# Patient Record
Sex: FEMALE | Race: WHITE | NOT HISPANIC OR LATINO | Employment: FULL TIME | URBAN - METROPOLITAN AREA
[De-identification: names, ages, dates, MRNs, and addresses within clinical notes are randomized per-mention and may not be internally consistent; named-entity substitution may affect disease eponyms.]

---

## 2017-01-09 ENCOUNTER — OFFICE VISIT (OUTPATIENT)
Dept: MEDICAL GROUP | Facility: PHYSICIAN GROUP | Age: 62
End: 2017-01-09
Payer: COMMERCIAL

## 2017-01-09 ENCOUNTER — HOSPITAL ENCOUNTER (OUTPATIENT)
Dept: LAB | Facility: MEDICAL CENTER | Age: 62
End: 2017-01-09
Attending: FAMILY MEDICINE
Payer: COMMERCIAL

## 2017-01-09 VITALS
HEIGHT: 65 IN | WEIGHT: 213 LBS | DIASTOLIC BLOOD PRESSURE: 78 MMHG | HEART RATE: 74 BPM | TEMPERATURE: 97.8 F | BODY MASS INDEX: 35.49 KG/M2 | SYSTOLIC BLOOD PRESSURE: 124 MMHG | OXYGEN SATURATION: 99 %

## 2017-01-09 DIAGNOSIS — E03.8 OTHER SPECIFIED HYPOTHYROIDISM: ICD-10-CM

## 2017-01-09 DIAGNOSIS — E66.9 OBESITY (BMI 30-39.9): ICD-10-CM

## 2017-01-09 DIAGNOSIS — R73.01 ELEVATED FASTING GLUCOSE: ICD-10-CM

## 2017-01-09 DIAGNOSIS — R79.89 LOW SERUM VITAMIN D: ICD-10-CM

## 2017-01-09 LAB
25(OH)D3 SERPL-MCNC: 58 NG/ML (ref 30–100)
ALBUMIN SERPL BCP-MCNC: 4.2 G/DL (ref 3.2–4.9)
ALBUMIN/GLOB SERPL: 1.6 G/DL
ALP SERPL-CCNC: 74 U/L (ref 30–99)
ALT SERPL-CCNC: 10 U/L (ref 2–50)
ANION GAP SERPL CALC-SCNC: 6 MMOL/L (ref 0–11.9)
AST SERPL-CCNC: 14 U/L (ref 12–45)
BILIRUB SERPL-MCNC: 0.5 MG/DL (ref 0.1–1.5)
BUN SERPL-MCNC: 17 MG/DL (ref 8–22)
CALCIUM SERPL-MCNC: 9.3 MG/DL (ref 8.5–10.5)
CHLORIDE SERPL-SCNC: 104 MMOL/L (ref 96–112)
CHOLEST SERPL-MCNC: 180 MG/DL (ref 100–199)
CO2 SERPL-SCNC: 28 MMOL/L (ref 20–33)
CREAT SERPL-MCNC: 0.83 MG/DL (ref 0.5–1.4)
EST. AVERAGE GLUCOSE BLD GHB EST-MCNC: 111 MG/DL
GLOBULIN SER CALC-MCNC: 2.6 G/DL (ref 1.9–3.5)
GLUCOSE SERPL-MCNC: 88 MG/DL (ref 65–99)
HBA1C MFR BLD: 5.5 % (ref 0–5.6)
HDLC SERPL-MCNC: 54 MG/DL
LDLC SERPL CALC-MCNC: 109 MG/DL
POTASSIUM SERPL-SCNC: 4.2 MMOL/L (ref 3.6–5.5)
PROT SERPL-MCNC: 6.8 G/DL (ref 6–8.2)
SODIUM SERPL-SCNC: 138 MMOL/L (ref 135–145)
T4 FREE SERPL-MCNC: 1.33 NG/DL (ref 0.53–1.43)
TRIGL SERPL-MCNC: 83 MG/DL (ref 0–149)
TSH SERPL DL<=0.005 MIU/L-ACNC: 0.79 UIU/ML (ref 0.3–3.7)

## 2017-01-09 PROCEDURE — 82306 VITAMIN D 25 HYDROXY: CPT

## 2017-01-09 PROCEDURE — 84439 ASSAY OF FREE THYROXINE: CPT

## 2017-01-09 PROCEDURE — 83036 HEMOGLOBIN GLYCOSYLATED A1C: CPT

## 2017-01-09 PROCEDURE — 84443 ASSAY THYROID STIM HORMONE: CPT

## 2017-01-09 PROCEDURE — 36415 COLL VENOUS BLD VENIPUNCTURE: CPT

## 2017-01-09 PROCEDURE — 80053 COMPREHEN METABOLIC PANEL: CPT

## 2017-01-09 PROCEDURE — 99204 OFFICE O/P NEW MOD 45 MIN: CPT | Performed by: FAMILY MEDICINE

## 2017-01-09 PROCEDURE — 80061 LIPID PANEL: CPT

## 2017-01-09 ASSESSMENT — PATIENT HEALTH QUESTIONNAIRE - PHQ9: CLINICAL INTERPRETATION OF PHQ2 SCORE: 0

## 2017-01-09 NOTE — MR AVS SNAPSHOT
"Freddy Smith   2017 8:40 AM   Office Visit   MRN: [id number]    Department:  [facility]   Dept Phone:  [phone]    Description:  Female : 1955   Provider:  Rose Pederson D.O.           Reason for Visit     Establish Care           Allergies as of 2017     No Known Allergies      You were diagnosed with     Other specified hypothyroidism   [2949313]       Obesity (BMI 30-39.9)   [707209]       Elevated fasting glucose   [335681]       Low serum vitamin D   [9096037]         Vital Signs     Blood Pressure Pulse Temperature Height Weight Body Mass Index    124/78 mmHg 74 36.6 °C (97.8 °F) 1.651 m (5' 5\") 96.616 kg (213 lb) 35.44 kg/m2    Oxygen Saturation Smoking Status                99% Never Smoker           Basic Information     Date Of Birth Sex Race Ethnicity Preferred Language    1955 Female White Non- English      Problem List              ICD-10-CM Priority Class Noted - Resolved    Generalized osteoarthritis M15.9   2014 - Present    Overweight E66.3   2014 - Present    Hypothyroidism E03.9   2014 - Present    Lower back injury S39.92XA   2014 - Present    Obesity (BMI 30-39.9) E66.9   2017 - Present    Elevated fasting glucose R73.01   2017 - Present    Low serum vitamin D R79.89   2017 - Present      Health Maintenance        Date Due Completion Dates    PAP SMEAR 1978 ---    MAMMOGRAM 1997 ---    IMM DTaP/Tdap/Td Vaccine (1 - Tdap) 1999    COLONOSCOPY 2007 ---            Current Immunizations     Hepatitis A Vaccine, Adult 1999    Hepatitis B Vaccine Recombivax (Adol/Adult) 2000, 1999, 1999    Influenza TIV (IM) 10/20/2012, 10/2/2011, 10/10/2010    Influenza Vaccine Quad Inj (Pf) 10/1/2016, 2014    Influenza Vaccine Quad Inj (Preserved) 10/5/2013    MMR Vaccine 1999, 1999    OPV - Historical Data 1999    Pneumococcal polysaccharide vaccine (PPSV-23) " 1/13/1998    TD Vaccine 5/18/1999      Below and/or attached are the medications your provider expects you to take. Review all of your home medications and newly ordered medications with your provider and/or pharmacist. Follow medication instructions as directed by your provider and/or pharmacist. Please keep your medication list with you and share with your provider. Update the information when medications are discontinued, doses are changed, or new medications (including over-the-counter products) are added; and carry medication information at all times in the event of emergency situations     Allergies:  No Known Allergies          Medications  Valid as of: January 09, 2017 -  9:12 AM    Generic Name Brand Name Tablet Size Instructions for use    Ergocalciferol (Cap) DRISDOL 00740 UNITS Take  by mouth every 7 days. Check with dr before resuming        Levothyroxine Sodium   Take 100 mcg by mouth every day.        Ondansetron (TABLET DISPERSIBLE) ZOFRAN ODT 4 MG Take 4 mg by mouth every 8 hours as needed. Indications: Nausea and Vomiting Following an Operation        .                 Medicines prescribed today were sent to:     [facility]/PHARMACY #[id number] - [place], [place] - [address] [address]    Phone: [phone] Fax: [phone]    Open 24 Hours?: No      Medication refill instructions:       If your prescription bottle indicates you have medication refills left, it is not necessary to call your provider’s office. Please contact your pharmacy and they will refill your medication.    If your prescription bottle indicates you do not have any refills left, you may request refills at any time through one of the following ways: The online [employer] system (except Urgent Care), by calling your provider’s office, or by asking your pharmacy to contact your provider’s office with a refill request. Medication refills are processed only during regular business hours  and may not be available until the next business day. Your provider may request additional information or to have a follow-up visit with you prior to refilling your medication.   *Please Note: Medication refills are assigned a new Rx number when refilled electronically. Your pharmacy may indicate that no refills were authorized even though a new prescription for the same medication is available at the pharmacy. Please request the medicine by name with the pharmacy before contacting your provider for a refill.        Your To Do List     Future Labs/Procedures Complete By Expires    COMP METABOLIC PANEL  As directed 1/10/2018    FREE THYROXINE  As directed 1/10/2018    HEMOGLOBIN A1C  As directed 1/10/2018    LIPID PROFILE  As directed 1/10/2018    TSH  As directed 1/10/2018    VITAMIN D,25 HYDROXY  As directed 1/10/2018         Transactiv Access Code: [email or web address]  Expires: 2/8/2017  9:12 AM    Your email address is not on file at [facility] [employer].  Email Addresses are required for you to sign up for Transactiv, please contact [phone] to verify your personal information and to provide your email address prior to attempting to register for Transactiv.    Freddy Smith  [address] [place] [address]    Transactiv  A secure, online tool to manage your health information     [employer]’s Transactiv® is a secure, online tool that connects you to your personalized health information from the privacy of your home -- day or night - making it very easy for you to manage your healthcare. Once the activation process is completed, you can even access your medical information using the Transactiv thea, which is available for free in the Apple Thea store or Google Play store.     To learn more about Transactiv, visit [email or web address]    There are two levels of access available (as shown below):   My Chart Features  [facility] Primary Care Doctor [facility]  Specialists [facility]  Urgent  Care Non-[facility] Primary Care Doctor   Email your  healthcare team securely and privately 24/7 X X X    Manage appointments: schedule your next appointment; view details of past/upcoming appointments X      Request prescription refills. X      View recent personal medical records, including lab and immunizations X X X X   View health record, including health history, allergies, medications X X X X   Read reports about your outpatient visits, procedures, consult and ER notes X X X X   See your discharge summary, which is a recap of your hospital and/or ER visit that includes your diagnosis, lab results, and care plan X X  X     How to register for Soma:  Once your e-mail address has been verified, follow the following steps to sign up for Soma.     1. Go to  [email or web address]  2. Click on the Sign Up Now box, which takes you to the New Member Sign Up page. You will need to provide the following information:  a. Enter your Soma Access Code exactly as it appears at the top of this page. (You will not need to use this code after you’ve completed the sign-up process. If you do not sign up before the expiration date, you must request a new code.)   b. Enter your date of birth.   c. Enter your home email address.   d. Click Submit, and follow the next screen’s instructions.  3. Create a Soma ID. This will be your Soma login ID and cannot be changed, so think of one that is secure and easy to remember.  4. Create a Soma password. You can change your password at any time.  5. Enter your Password Reset Question and Answer. This can be used at a later time if you forget your password.   6. Enter your e-mail address. This allows you to receive e-mail notifications when new information is available in Soma.  7. Click Sign Up. You can now view your health information.    For assistance activating your Soma account, call [phone]

## 2017-01-09 NOTE — ASSESSMENT & PLAN NOTE
Ongoing issue. Patient reports 100% compliance with medication; she is currently on levothyroxine 100 µg daily. Patient denies any issues with temperature intolerance, skin changes, hair changes. Chart review shows that blood work done almost 1 year ago showed a normal TSH and T4

## 2017-01-09 NOTE — ASSESSMENT & PLAN NOTE
Long-standing history. Patient reports that she has had some issues in the past with elevated fasting blood sugar but is ever been diagnosed with diabetes. She does have a significant family history of diabetes and as a result has been monitored closely in the past.

## 2017-01-09 NOTE — ASSESSMENT & PLAN NOTE
Ongoing issue. Patient reports that she is currently taking 50,000 international units of vitamin D weekly. Chart review shows a blood work done almost 1 year ago revealed a vitamin D level at 46

## 2017-01-09 NOTE — ASSESSMENT & PLAN NOTE
Ongoing issue. Patient states that she tries to eat healthy, does not get any exercise, but does stay very active. Patient reports that she does this she has an issue with increased weight but is not motivated to make any other changes at this time.

## 2017-01-09 NOTE — PROGRESS NOTES
Freddy Smith is a 61 y.o. female here for elevated fasting glucose, thyroid, low vit D, obesity  HPI:  Patient is here today to establish care; her previous primary care provider is no longer in the area. She is a pleasant 61-year-old female who presents with the following concerns:  Elevated fasting glucose  Long-standing history. Patient reports that she has had some issues in the past with elevated fasting blood sugar but is ever been diagnosed with diabetes. She does have a significant family history of diabetes and as a result has been monitored closely in the past.    Hypothyroidism  Ongoing issue. Patient reports 100% compliance with medication; she is currently on levothyroxine 100 µg daily. Patient denies any issues with temperature intolerance, skin changes, hair changes. Chart review shows that blood work done almost 1 year ago showed a normal TSH and T4    Low serum vitamin D  Ongoing issue. Patient reports that she is currently taking 50,000 international units of vitamin D weekly. Chart review shows a blood work done almost 1 year ago revealed a vitamin D level at 46    Obesity (BMI 30-39.9)  Ongoing issue. Patient states that she tries to eat healthy, does not get any exercise, but does stay very active. Patient reports that she does this she has an issue with increased weight but is not motivated to make any other changes at this time.    Current medicines (including changes today)  Current Outpatient Prescriptions   Medication Sig Dispense Refill   • Levothyroxine Sodium (SYNTHROID PO) Take 100 mcg by mouth every day.     • vitamin D, Ergocalciferol, (DRISDOL) 26432 UNIT CAPS capsule Take  by mouth every 7 days. Check with dr before resuming     • ondansetron (ZOFRAN ODT) 4 MG TBDP Take 4 mg by mouth every 8 hours as needed. Indications: Nausea and Vomiting Following an Operation       No current facility-administered medications for this visit.     She  has a past medical history of  "Unspecified disorder of thyroid and Pain.  She  has past surgical history that includes other abdominal surgery (//); ventral hernia repair (2011); and panniculectomy (2011).  Social History   Substance Use Topics   • Smoking status: Never Smoker    • Smokeless tobacco: Never Used   • Alcohol Use: No     Social History     Social History Narrative     Family History   Problem Relation Age of Onset   • Heart Disease Mother    • Cancer Mother      breast   • Diabetes Mother    • Other Father      AAA   • No Known Problems Sister    • Alcohol/Drug Brother    • No Known Problems Brother    • No Known Problems Sister      Family Status   Relation Status Death Age   • Mother     • Father     • Sister Alive    • Brother Alive    • Brother Alive    • Sister Alive          ROS  12 point ROS completed and no pertinent positives identified at this time  All other systems reviewed and are negative     Objective:     Blood pressure 124/78, pulse 74, temperature 36.6 °C (97.8 °F), height 1.651 m (5' 5\"), weight 96.616 kg (213 lb), SpO2 99 %. Body mass index is 35.44 kg/(m^2).  Physical Exam:    Constitutional: Alert, no distress.  Skin: Warm, dry, good turgor, no rashes in visible areas.  Eye: Equal, round and reactive, conjunctiva clear, lids normal.  ENMT: Lips without lesions, good dentition, oropharynx clear.  Neck: Trachea midline, no masses, no thyromegaly. No cervical or supraclavicular lymphadenopathy.  Respiratory: Unlabored respiratory effort, lungs clear to auscultation, no wheezes, no ronchi.  Cardiovascular: Normal S1, S2, no murmur, no edema.  Abdomen: Soft, non-tender, no masses, no hepatosplenomegaly.  Psych: Alert and oriented x3, normal affect and mood.  Neuro: CN 2-12 grossly intact      Assessment and Plan:   The following treatment plan was discussed     1. Other specified hypothyroidism  TSH    FREE THYROXINE    Stable. Continue current medications; sent for labs. Monitor " for results   2. Obesity (BMI 30-39.9)  Patient identified as having weight management issue.  Appropriate orders and counseling given.    LIPID PROFILE    Uncontrolled. Encouraged patient to consider some more exercise. Monitor   3. Elevated fasting glucose  COMP METABOLIC PANEL    HEMOGLOBIN A1C    Stable. Sent for laboratory evaluation. Monitor for results.   4. Low serum vitamin D  VITAMIN D,25 HYDROXY    Stable. Continue current medications; sent for labs. Monitor for results        Records requested.  Followup: Return in about 1 year (around 1/9/2018) for yearly physical, Short.

## 2017-01-10 ENCOUNTER — TELEPHONE (OUTPATIENT)
Dept: MEDICAL GROUP | Facility: PHYSICIAN GROUP | Age: 62
End: 2017-01-10

## 2017-01-10 NOTE — TELEPHONE ENCOUNTER
----- Message from Rose Pederson D.O. sent at 1/9/2017  5:47 PM PST -----  Please advise pt that all labs are in a normal/acceptable range. Please keep taking medications and supplements.    Rose Pederson D.O.

## 2017-01-10 NOTE — Clinical Note
January 10, 2017         Freddy Smith  [address] [place] [address]        Dear Juanaiabrooklyn:      Below are the results from your recent visit:    Resulted Orders   COMP METABOLIC PANEL   Result Value Ref Range    Sodium 138 135 - 145 mmol/L    Potassium 4.2 3.6 - 5.5 mmol/L    Chloride 104 96 - 112 mmol/L    Co2 28 20 - 33 mmol/L    Anion Gap 6.0 0.0 - 11.9    Glucose 88 65 - 99 mg/dL    Bun 17 8 - 22 mg/dL    Creatinine 0.83 0.50 - 1.40 mg/dL    Calcium 9.3 8.5 - 10.5 mg/dL    AST(SGOT) 14 12 - 45 U/L    ALT(SGPT) 10 2 - 50 U/L    Alkaline Phosphatase 74 30 - 99 U/L    Total Bilirubin 0.5 0.1 - 1.5 mg/dL    Albumin 4.2 3.2 - 4.9 g/dL    Total Protein 6.8 6.0 - 8.2 g/dL    Globulin 2.6 1.9 - 3.5 g/dL    A-G Ratio 1.6 g/dL    Narrative    Request patient fasting?->Yes   HEMOGLOBIN A1C   Result Value Ref Range    Glycohemoglobin 5.5 0.0 - 5.6 %      Comment:      Increased risk for diabetes:  5.7 -6.4%  Diabetes:  >6.4%  Glycemic control for adults with diabetes:  <7.0%  The above interpretations are per ADA guidelines.  Diagnosis  of diabetes mellitus on the basis of elevated Hemoglobin A1c  should be confirmed by repeating the Hb A1c test.      Est Avg Glucose 111 mg/dL      Comment:      The eAG calculation is based on the A1c-Derived Daily Glucose  (ADAG) study.  See the ADA's website for additional information.      Narrative    Request patient fasting?->Yes   LIPID PROFILE   Result Value Ref Range    Cholesterol,Tot 180 100 - 199 mg/dL    Triglycerides 83 0 - 149 mg/dL    HDL 54 >=40 mg/dL     (H) <100 mg/dL    Narrative    Request patient fasting?->Yes   TSH   Result Value Ref Range    TSH 0.790 0.300 - 3.700 uIU/mL    Narrative    Request patient fasting?->Yes   FREE THYROXINE   Result Value Ref Range    Free T-4 1.33 0.53 - 1.43 ng/dL    Narrative    Request patient fasting?->Yes   VITAMIN D,25 HYDROXY   Result Value Ref Range    25-Hydroxy   Vitamin D 25 58 30 - 100 ng/mL      Comment:    Adult Ranges:   <20 ng/mL - Deficiency  20-29 ng/mL - Insufficiency   ng/mL - Sufficiency  The [employer] Centaur Vitamin D Assay is standardized to the  Pending sale to [facility] reference measurement procedures, a  reference method for the Vitamin D Standardization Program  (VDSP).  The VDSP aligns patient results among 25 (OH)  Vitamin D methods.      Narrative    Request patient fasting?->Yes   ESTIMATED GFR   Result Value Ref Range    GFR If African American >60 >60 mL/min/1.73 m 2    GFR If Non African American >60 >60 mL/min/1.73 m 2    Narrative    Request patient fasting?->Yes     The test results show that all labs are in a normal/acceptable range. Please keep taking medications and supplements. .    If you have any questions or concerns, please don't hesitate to call.        Sincerely,      Rose Pederson D.O.    Electronically Signed

## 2017-06-26 ENCOUNTER — OFFICE VISIT (OUTPATIENT)
Dept: MEDICAL GROUP | Facility: PHYSICIAN GROUP | Age: 62
End: 2017-06-26
Payer: COMMERCIAL

## 2017-06-26 VITALS
SYSTOLIC BLOOD PRESSURE: 126 MMHG | HEART RATE: 67 BPM | WEIGHT: 212 LBS | BODY MASS INDEX: 35.32 KG/M2 | DIASTOLIC BLOOD PRESSURE: 78 MMHG | TEMPERATURE: 98.2 F | OXYGEN SATURATION: 91 % | HEIGHT: 65 IN

## 2017-06-26 DIAGNOSIS — R79.89 LOW SERUM VITAMIN D: ICD-10-CM

## 2017-06-26 DIAGNOSIS — E03.9 ACQUIRED HYPOTHYROIDISM: ICD-10-CM

## 2017-06-26 DIAGNOSIS — M15.9 GENERALIZED OSTEOARTHRITIS: ICD-10-CM

## 2017-06-26 PROCEDURE — 99214 OFFICE O/P EST MOD 30 MIN: CPT | Performed by: FAMILY MEDICINE

## 2017-06-26 RX ORDER — ERGOCALCIFEROL 1.25 MG/1
50000 CAPSULE ORAL
Qty: 30 CAP | Refills: 0 | Status: SHIPPED | OUTPATIENT
Start: 2017-06-26 | End: 2017-08-31 | Stop reason: SDUPTHER

## 2017-06-26 RX ORDER — LEVOTHYROXINE SODIUM 0.1 MG/1
100 TABLET ORAL DAILY
Qty: 90 TAB | Refills: 3 | Status: SHIPPED | OUTPATIENT
Start: 2017-06-26 | End: 2017-08-31 | Stop reason: SDUPTHER

## 2017-06-26 ASSESSMENT — PAIN SCALES - GENERAL: PAINLEVEL: NO PAIN

## 2017-06-26 NOTE — MR AVS SNAPSHOT
"        Freddy Smith   2017 8:40 AM   Office Visit   MRN: [id number]    Department:  [facility]   Dept Phone:  [phone]    Description:  Female : 1955   Provider:  Priyank Don M.D.           Reason for Visit     Medication Management medication refill      Allergies as of 2017     No Known Allergies      You were diagnosed with     Acquired hypothyroidism   [2512880]       Low serum vitamin D   [1828294]       Generalized osteoarthritis   [328677]         Vital Signs     Blood Pressure Pulse Temperature Height Weight Body Mass Index    126/78 mmHg 67 36.8 °C (98.2 °F) 1.651 m (5' 5\") 96.163 kg (212 lb) 35.28 kg/m2    Oxygen Saturation Smoking Status                91% Never Smoker           Basic Information     Date Of Birth Sex Race Ethnicity Preferred Language    1955 Female White Non- English      Problem List              ICD-10-CM Priority Class Noted - Resolved    Generalized osteoarthritis M15.9   2014 - Present    Hypothyroidism E03.9   2014 - Present    Lower back injury S39.92XA   2014 - Present    Obesity (BMI 30-39.9) E66.9   2017 - Present    Elevated fasting glucose R73.01   2017 - Present    Low serum vitamin D R79.89   2017 - Present      Health Maintenance        Date Due Completion Dates    PAP SMEAR 1978 ---    IMM DTaP/Tdap/Td Vaccine (1 - Tdap) 1999    COLONOSCOPY 2007 ---    IMM ZOSTER VACCINE 2017 ---    MAMMOGRAM 2018 (Originally 1997) ---            Current Immunizations     Hepatitis A Vaccine, Adult 1999    Hepatitis B Vaccine Recombivax (Adol/Adult) 2000, 1999, 1999    Influenza TIV (IM) 10/20/2012, 10/2/2011, 10/10/2010    Influenza Vaccine Quad Inj (Pf) 10/1/2016, 2014    Influenza Vaccine Quad Inj (Preserved) 10/5/2013    MMR Vaccine 1999, 1999    OPV - Historical Data 1999    Pneumococcal polysaccharide vaccine (PPSV-23) " 1/13/1998    TD Vaccine 5/18/1999      Below and/or attached are the medications your provider expects you to take. Review all of your home medications and newly ordered medications with your provider and/or pharmacist. Follow medication instructions as directed by your provider and/or pharmacist. Please keep your medication list with you and share with your provider. Update the information when medications are discontinued, doses are changed, or new medications (including over-the-counter products) are added; and carry medication information at all times in the event of emergency situations     Allergies:  No Known Allergies          Medications  Valid as of: June 26, 2017 - 11:05 AM    Generic Name Brand Name Tablet Size Instructions for use    Ergocalciferol (Cap) DRISDOL 03893 UNITS Take 1 Cap by mouth every 7 days.        Levothyroxine Sodium (Tab) SYNTHROID 100 MCG Take 1 Tab by mouth every day.        Ondansetron (TABLET DISPERSIBLE) ZOFRAN ODT 4 MG Take 4 mg by mouth every 8 hours as needed. Indications: Nausea and Vomiting Following an Operation        .                 Medicines prescribed today were sent to:     [facility]/PHARMACY #[id number] - [place], [place] - [address] [place] [address]    Phone: [phone] Fax: [phone]    Open 24 Hours?: No      Medication refill instructions:       If your prescription bottle indicates you have medication refills left, it is not necessary to call your provider’s office. Please contact your pharmacy and they will refill your medication.    If your prescription bottle indicates you do not have any refills left, you may request refills at any time through one of the following ways: The online [employer] system (except Urgent Care), by calling your provider’s office, or by asking your pharmacy to contact your provider’s office with a refill request. Medication refills are processed only during regular business hours and may  not be available until the next business day. Your provider may request additional information or to have a follow-up visit with you prior to refilling your medication.   *Please Note: Medication refills are assigned a new Rx number when refilled electronically. Your pharmacy may indicate that no refills were authorized even though a new prescription for the same medication is available at the pharmacy. Please request the medicine by name with the pharmacy before contacting your provider for a refill.        Referral     A referral request has been sent to our patient care coordination department. Please allow 3-5 business days for us to process this request and contact you either by phone or mail. If you do not hear from us by the 5th business day, please call us at [phone].           [email or web address] Access Code: Activation code not generated  Current [email or web address] Status: Active

## 2017-06-26 NOTE — ASSESSMENT & PLAN NOTE
Patient has arthritis of the joints of her hand and both knees. She manages the pain with doing stretching exercises and physical activity. She has in the past been evaluated and [facility]. She would like to follow-up with them and get reevaluated. Therefore she is requesting a referral today.

## 2017-06-26 NOTE — ASSESSMENT & PLAN NOTE
Patient has hypothyroidism. She takes levothyroxine 100 µg daily. Her last TSH level done in January was normal. She is tolerating the medication well. Denies any skin changes, heat or cold intolerance, anxiety or depression.

## 2017-06-26 NOTE — ASSESSMENT & PLAN NOTE
Patient has had low vitamin D level in the past and has been on vitamin D 50,000 units every 7 days. Her vitamin D level done in January 2017 was normal at 58. We discussed that she currently does not necessarily need to be on a vitamin D replacement dose but cannot be on a maintenance dose. However patient insists to continue on her current prescription, stating the fact that she is [employer] and she always stays covered and therefore does not get enough sun exposure.

## 2017-08-31 DIAGNOSIS — E03.9 ACQUIRED HYPOTHYROIDISM: ICD-10-CM

## 2017-08-31 DIAGNOSIS — R79.89 LOW SERUM VITAMIN D: ICD-10-CM

## 2017-08-31 RX ORDER — LEVOTHYROXINE SODIUM 0.1 MG/1
100 TABLET ORAL DAILY
Qty: 90 TAB | Refills: 3 | Status: SHIPPED | OUTPATIENT
Start: 2017-08-31 | End: 2018-10-12 | Stop reason: SDUPTHER

## 2017-08-31 RX ORDER — ERGOCALCIFEROL 1.25 MG/1
50000 CAPSULE ORAL
Qty: 12 CAP | Refills: 3 | Status: SHIPPED | OUTPATIENT
Start: 2017-08-31 | End: 2018-10-12 | Stop reason: SDUPTHER

## 2017-10-02 ENCOUNTER — OFFICE VISIT (OUTPATIENT)
Dept: RHEUMATOLOGY | Facility: PHYSICIAN GROUP | Age: 62
End: 2017-10-02
Payer: COMMERCIAL

## 2017-10-02 VITALS
RESPIRATION RATE: 12 BRPM | DIASTOLIC BLOOD PRESSURE: 88 MMHG | SYSTOLIC BLOOD PRESSURE: 136 MMHG | OXYGEN SATURATION: 97 % | HEIGHT: 66 IN | HEART RATE: 58 BPM | BODY MASS INDEX: 34.72 KG/M2 | WEIGHT: 216 LBS | TEMPERATURE: 98.5 F

## 2017-10-02 DIAGNOSIS — M15.9 GENERALIZED OSTEOARTHRITIS: ICD-10-CM

## 2017-10-02 DIAGNOSIS — M85.9 DISORDER OF BONE DENSITY AND STRUCTURE, UNSPECIFIED: ICD-10-CM

## 2017-10-02 PROCEDURE — 99244 OFF/OP CNSLTJ NEW/EST MOD 40: CPT | Performed by: INTERNAL MEDICINE

## 2017-10-02 RX ORDER — CALCIUM CARBONATE 500(1250)
500 TABLET ORAL 2 TIMES DAILY WITH MEALS
Qty: 60 TAB | Refills: 11 | Status: SHIPPED | OUTPATIENT
Start: 2017-10-02 | End: 2018-11-21

## 2017-10-02 NOTE — PROGRESS NOTES
Chief Complaint- osteoarthritis    Chief Complaint   Patient presents with   • New Patient     Freddy Smith is a 62 y.o. female here as a new Rheumatology Consult referred by Rose Pederson D.O. for consultation regarding osteoarthritis    HPI:     Ms. Freddy Smith Is a previous patient with Dr. rodríguez who is managed for general osteoarthritis. She has a past medical history of hypothyroidism and first presented to Dr. Bruton in March 2013 with a history of an old right knee injury. In addition she is also on appetite and noticed changes in the DIP particularly the left DIP on evaluation Dr. smith she had hypothyroidism, generalized osteoarthritis affecting the knees as well as joints of the hands on her follow-up visit in March correction and 11 2013 she was just taking intermittent anti-inflammatory or Tylenol. Her hands look like they were degenerative arthritis with early Heberden's nodes bilaterally she still had good gastritis and strength. She was advised to follow-up and to do regular exercise program including swimming.      She does reports about 5-6 months ago she had aches and pain and would take an aspirin fo rher pain. She take aspirin 1-2 times a week.  She is sleeping well.  She can carry up to 100 lb and is very active.  She is no longer swimming.  She reports it is cost prohibitive and does not have time to get to the gym.    She reports with her busy lifestlye she will incorporate exercises into her daily activities.     She has been doing exercises on how to strengthen the knee.  She reports the exercises do help and she does the exercises several times a week.     She received a very active lifestyle denies any fatigue, morning stiffness, swollen joints. She denies any joint pain however does know that she has some crackles when she squats or bends with her knees. She does feel like she has some low back. However these pains are far and few between intermittent and  really requires intermittent use of aspirin. She denies any rash.  She denies any dry mouth. She denies any hair loss. She denies any persistent headaches.     Results for CAITY SOLIS (MRN [id number]) as of 10/2/2017 08:33   Ref. Range 11/4/2013 14:08   Rheumatoid Factor -Neph- Latest Ref Range: 0 - 14 IU/mL 8   Stat C-Reactive Protein Latest Ref Range: 0.00 - 0.75 mg/dL 0.30   Ccp Antibodies Latest Ref Range: 0 - 19 Units 3   Antinuclear Antibody Latest Ref Range: None Detected  None Detected     11/4/2013 knee xray bilateral showed bilateral medical compartment OA R>>L    She had a DEXA scan performed 12/13/2013 and this showed in the lumbar spine at bone mineral density of 1.2 g/cm² with a T score of +1.4 and her femoral neck left was 0.847 g/cm² with a T score of 0.0 and a total hip on the left was 1.002 g/cm² with a T score of +0.5. At that time her 10 year probability of major fracture was estimated to be at 5%    Past Medical History:  Hypothryoidism, she is pre-diabetic    Family History: sister has JRA, mother had MI and cancer, DM, father had thoracic aneurysm    Social History:  Islamic Rastafarian, norweigen ethnic background, non smoker, and never chewed tobacco            Current medicines (including changes today)  Current Outpatient Prescriptions   Medication Sig Dispense Refill   • levothyroxine (SYNTHROID) 100 MCG Tab Take 1 Tab by mouth every day. 90 Tab 3   • vitamin D, Ergocalciferol, (DRISDOL) 95283 units Cap capsule Take 1 Cap by mouth every 7 days. 12 Cap 3   • ondansetron (ZOFRAN ODT) 4 MG TBDP Take 4 mg by mouth every 8 hours as needed. Indications: Nausea and Vomiting Following an Operation       No current facility-administered medications for this visit.      She  has a past medical history of Pain and Unspecified disorder of thyroid.  She  has a past surgical history that includes other abdominal surgery (1990/2001/2006); ventral hernia repair (9/7/2011); and panniculectomy  "(2011).  Family History   Problem Relation Age of Onset   • Heart Disease Mother    • Cancer Mother      breast   • Diabetes Mother    • Other Father      AAA   • No Known Problems Sister    • Alcohol/Drug Brother    • No Known Problems Brother    • No Known Problems Sister      Family Status   Relation Status   • Mother    • Father    • Sister Alive   • Brother Alive   • Brother Alive   • Sister Alive     Social History   Substance Use Topics   • Smoking status: Never Smoker   • Smokeless tobacco: Never Used   • Alcohol use No     Social History     Social History Narrative   • No narrative on file         ROS  Constitutional ROS: No unexplained fevers, sweats, or chills  Eye ROS: No eye pain, redness, discharge  Ear ROS: No recent change in hearing  Mouth/Throat ROS: No recent change in voice or hoarseness  Neck ROS: No significant pain in neck  Pulmonary ROS: No chronic cough, sputum, or hemoptysis  Cardiovascular ROS: No chest pain, No shortness of breath  Gastrointestinal ROS: No nausea, vomiting, diarrhea, or constipation  Musculoskeletal/Extremities ROS: no morning stiffness  Hematologic/Lymphatic ROS: No coagulation disorder  Skin/Integumentary ROS: color, texture and temperature normal  Neurologic ROS: Normal development       Objective:     Blood pressure 136/88, pulse (!) 58, temperature 36.9 °C (98.5 °F), resp. rate 12, height 1.676 m (5' 6\"), weight 98 kg (216 lb), SpO2 97 %, not currently breastfeeding. Body mass index is 34.86 kg/m².  Physical Exam:    Vitals:    10/02/17 0810   BP: 136/88   Pulse: (!) 58   Resp: 12   Temp: 36.9 °C (98.5 °F)   SpO2: 97%   Weight: 98 kg (216 lb)   Height: 1.676 m (5' 6\")    Body mass index is 34.86 kg/m².    General/Constitutional: NAD not diaphoretic, comfortable  Eyes: clear conjunctiva, no scleral icterus, EOMI,  Ears, Nose, Mouth,Throat: no oral ulcers, good dentition, moist mucous membranes, no discharge from ears bilaterally  Cardiovascular: " regular rate and rhythm.  No murmurs, gallops, rubs  Respiratory: normal effort, unlabored respiration.  On auscultation no wheezes, rales, rhonchi.  Clear to auscultation.  GI: soft, NTTP no hepatosplenomegaly, nondistended  Musculoskeletal  Axial:  Thoracic: no paraspinal tenderness  Lumbar: no paraspinal tenderness and no midline tenderness  Upper Extremities:  No synovitis of the PIP, DIP, MCP  Heberbon nodes appreciated at the DIP but her 5th DIP  Is flexed  Wrists and Elbows have good ROM  Mild tenderness at 120 degree abduction on the right shoulder  Lower Extremities:  No knee effusion bilateral, crepitus bilateral  No MTP tenderness bilateral  Gait is normal  Skin: Maxillary erythema that does extend into the nasolabial folds. Limited skin exam.  no rashes, no digital ulcerations, no alopecia, no tophi, no skin thickening, no nodules  Neuro: CN II-XII grossly intact, Alert, Oriented x 3, moves all four extremities  Psych: normal affect, normal mood, judgement appropriate, follows commands, responses are appropritae  Heme/Lymph: no cervical adenopathy    No results found for: QNTTBGOLD  No results found for: HEPBCORTOT, HEPBCORIGM, HEPBSAG  No results found for: HEPCAB  Lab Results   Component Value Date/Time    SODIUM 138 01/09/2017 09:33 AM    POTASSIUM 4.2 01/09/2017 09:33 AM    CHLORIDE 104 01/09/2017 09:33 AM    CO2 28 01/09/2017 09:33 AM    GLUCOSE 88 01/09/2017 09:33 AM    BUN 17 01/09/2017 09:33 AM    CREATININE 0.83 01/09/2017 09:33 AM      Lab Results   Component Value Date/Time    WBC 3.6 (L) 04/06/2016 08:44 AM    RBC 4.49 04/06/2016 08:44 AM    HEMOGLOBIN 13.9 04/06/2016 08:44 AM    HEMATOCRIT 43.7 04/06/2016 08:44 AM    MCV 97.3 04/06/2016 08:44 AM    MCH 31.0 04/06/2016 08:44 AM    MCHC 31.8 (L) 04/06/2016 08:44 AM    MPV 10.3 04/06/2016 08:44 AM    NEUTSPOLYS 66.00 04/06/2016 08:44 AM    LYMPHOCYTES 21.20 (L) 04/06/2016 08:44 AM    MONOCYTES 7.80 04/06/2016 08:44 AM    EOSINOPHILS 4.20  04/06/2016 08:44 AM    BASOPHILS 0.80 04/06/2016 08:44 AM      Lab Results   Component Value Date/Time    CALCIUM 9.3 01/09/2017 09:33 AM    ASTSGOT 14 01/09/2017 09:33 AM    ALTSGPT 10 01/09/2017 09:33 AM    ALKPHOSPHAT 74 01/09/2017 09:33 AM    TBILIRUBIN 0.5 01/09/2017 09:33 AM    ALBUMIN 4.2 01/09/2017 09:33 AM    TOTPROTEIN 6.8 01/09/2017 09:33 AM     Lab Results   Component Value Date/Time    URICACID 5.9 11/04/2013 02:08 PM    RHEUMFACTN 8 11/04/2013 02:08 PM    CCPANTIBODY 3 11/04/2013 02:08 PM    ANTINUCAB None Detected 11/04/2013 02:08 PM     Lab Results   Component Value Date/Time    SEDRATEWES 24 11/04/2013 02:08 PM    CREACTPROT 0.30 11/04/2013 02:08 PM     No results found for: RUSSELVIPER, DRVVTINTERP  No results found for: O1MPBSRYPIT, L0PMZFDNFNN, IGGCARDIOLI, IGMCARDIOLI, IGACARDIOLI, CRYOGLOBULIN  No results found for: ANADIRECT, ANTIDNADS, RNPAB, SMITHAB, GQHFTES91, SSAROAB, SSBLAAB, AHORYZ0BA, CENTROMBAB  No results found for: ANTIDNADS, DSDNA, AGBMAB, GBMABA, ANCAIGG, [id number], JO1AB, RNPAB, ANTISSASJ, ANTISSBSJ  Lab Results   Component Value Date/Time    COLORURINE Colorless 11/04/2013 02:08 PM    SPECGRAVITY 1.006 11/04/2013 02:08 PM    PHURINE 6.0 11/04/2013 02:08 PM    GLUCOSEUR Negative 11/04/2013 02:08 PM    KETONES Negative 11/04/2013 02:08 PM    PROTEINURIN Negative 11/04/2013 02:08 PM     No results found for: TOTPROTUR, TOTALVOLUME, ZSAQINST83  No results found for: SSA60, SSA52  Lab Results   Component Value Date/Time    HBA1C 5.5 01/09/2017 09:33 AM     Lab Results   Component Value Date/Time    CPKTOTAL 94 11/04/2013 02:08 PM     No results found for: G6PD  No results found for: NTCX25LSTQ  No results found for: ACESERUM  Lab Results   Component Value Date/Time    25HYDROXY 58 01/09/2017 09:33 AM     No results found for: TSH, FREEDIR  Lab Results   Component Value Date/Time    TSHULTRASEN 0.790 01/09/2017 09:33 AM    FREET4 1.33 01/09/2017 09:33 AM     No results found for:  MICROSOMALA, ANTITHYROGL  No results found for: IGGLYMEABS  No results found for: ANTIMITOCHO, FACTIN  No results found for: IGA, TTRANSIGA, ENDOIGA  No results found for: FLTYPE, CRYSTALSBDF  No results found for: ISTATICAL  No results found for: ISTATCREAT  No results found for: CTELOPEP  No results found for: GBMABG  No results found for: PTHINTACT  Results for orders placed in visit on 11/04/13   DX-JOINT SURVEY-HANDS SINGLE VIEW    Impression Bilateral osteoarthritis.            INTERPRETING LOCATION: [address] [facility], [place], [address]     Results for orders placed in visit on 11/04/13   DX-JOINT SURVEY-FEET SINGLE VIEW    Impression Unremarkable examination.            INTERPRETING LOCATION: [address] [facility], [place], [address]             Results for orders placed in visit on 11/04/13   DX-KNEES-AP BILATERAL STANDING    Impression Bilateral medial compartment osteoarthritis, right greater than left.            INTERPRETING LOCATION: [address] [facility], [place], [address]              Results for orders placed in visit on 11/04/13   DX-KNEE 2-    Impression No evidence of subluxation with apparent mild lateral posterior spurring.            INTERPRETING [facility]ATION[facility] [address] [facility], [place], [address]                   Results for orders placed in visit on 04/13/14   DX-CERVICAL SPINE-2 OR 3 VIEWS    Impression Normal cervical spine.             Assessment and Plan:  Ms. Freddy Smith is here to re-establish care for her osteoarthritis.  She was diagnosed back in 2013 with Dr. Melo.  Physical exam I do appreciate some progression of her disease and she herself does report increased limitations in her joints. Today we spent a large portion of the time discussing care and protection of joints. She is noticing some right shoulder pain which I suspect could be more of a rotator cuff tendinopathy. I have provided her with exercises to do. I also educated her on importance of ergonomics.  In  terms of medication that she is using very limiting medications and really is only taking an aspirin intermittently every few weeks.  As I am noticing more prominent DIPs I we will order a hand x-ray to follow for concern that there could be a component of erosive osteoarthritis. She otherwise has no symptoms of inflammatory arthropathy including morning stiffness or joint swelling. There is a family history of JRA and so we will continue to monitor her closely. On her initial evaluation with our clinic her rheumatoid factor and CORY was negative.    Last bone density was in 2013 and so we will repeat that again. I have advised the patient take calcium and vitamin D. We will provide her with some supplements.  Her vitamin D was checked in the beginning of this year and was noted to be normal.    I will see her again in 6 months if she continues to be stable I will review exercises with her and then see her annually.    1. Generalized osteoarthritis  DX-JOINT SURVEY-HANDS SINGLE VIEW    DS-BONE DENSITY STUDY (DEXA)   2. Disorder of bone density and structure, unspecified           Records requested.  Followup: No Follow-up on file. or sooner prn  Patient was seen 60 minutes face-to-face (excluding time for procedures)  of which more than 50% the time was spent counseling the patient regarding  rheumatological conditions and care. Therapy was discussed in detail.  Thank you for this referral.

## 2017-10-02 NOTE — LETTER
[facility]   [address], [address]  [place] [address]  Phone: [phone]  Fax: [phone]              Encounter Date: 10/2/2017    Dear Dr. Don,    It was a pleasure seeing your patient, Freddy Smith, on 10/2/2017. Diagnoses of Generalized osteoarthritis and Disorder of bone density and structure, unspecified were pertinent to this visit.     Please find attached progress note which includes the history I obtained from Ms. Smith, my physical examination findings, my impression and recommendations.      Once again, it was a pleasure participating in your patient's care.  Please feel free to contact me if you have any questions or if I can be of any further assistance to your patients.      Sincerely,    Lidia Mercer M.D.  Electronically Signed          PROGRESS NOTE:  Chief Complaint- osteoarthritis    Chief Complaint   Patient presents with   • New Patient     Freddy Smith is a 62 y.o. female here as a new Rheumatology Consult referred by Rose Pederson D.O. for consultation regarding osteoarthritis    HPI:     Ms. Freddy Smith Is a previous patient with Dr. rodríguez who is managed for general osteoarthritis. She has a past medical history of hypothyroidism and first presented to Dr. Bruton in March 2013 with a history of an old right knee injury. In addition she is also on appetite and noticed changes in the DIP particularly the left DIP on evaluation Dr. smith she had hypothyroidism, generalized osteoarthritis affecting the knees as well as joints of the hands on her follow-up visit in March correction and 11 2013 she was just taking intermittent anti-inflammatory or Tylenol. Her hands look like they were degenerative arthritis with early Heberden's nodes bilaterally she still had good gastritis and strength. She was advised to follow-up and to do regular exercise program including swimming.      She does reports about 5-6 months ago she  had aches and pain and would take an aspirin fo rher pain. She take aspirin 1-2 times a week.  She is sleeping well.  She can carry up to 100 lb and is very active.  She is no longer swimming.  She reports it is cost prohibitive and does not have time to get to the gym.    She reports with her busy lifestlye she will incorporate exercises into her daily activities.     She has been doing exercises on how to strengthen the knee.  She reports the exercises do help and she does the exercises several times a week.     She received a very active lifestyle denies any fatigue, morning stiffness, swollen joints. She denies any joint pain however does know that she has some crackles when she squats or bends with her knees. She does feel like she has some low back. However these pains are far and few between intermittent and really requires intermittent use of aspirin. She denies any rash.  She denies any dry mouth. She denies any hair loss. She denies any persistent headaches.     Results for CAITY SOLIS (MRN [id number]) as of 10/2/2017 08:33   Ref. Range 11/4/2013 14:08   Rheumatoid Factor -Neph- Latest Ref Range: 0 - 14 IU/mL 8   Stat C-Reactive Protein Latest Ref Range: 0.00 - 0.75 mg/dL 0.30   Ccp Antibodies Latest Ref Range: 0 - 19 Units 3   Antinuclear Antibody Latest Ref Range: None Detected  None Detected     11/4/2013 knee xray bilateral showed bilateral medical compartment OA R>>L    She had a DEXA scan performed 12/13/2013 and this showed in the lumbar spine at bone mineral density of 1.2 g/cm² with a T score of +1.4 and her femoral neck left was 0.847 g/cm² with a T score of 0.0 and a total hip on the left was 1.002 g/cm² with a T score of +0.5. At that time her 10 year probability of major fracture was estimated to be at 5%    Past Medical History:  Hypothryoidism, she is pre-diabetic    Family History: sister has JRA, mother had MI and cancer, DM, father had thoracic aneurysm    Social History:  Islamic  Zoroastrian, norweigen ethnic background, non smoker, and never chewed tobacco            Current medicines (including changes today)  Current Outpatient Prescriptions   Medication Sig Dispense Refill   • levothyroxine (SYNTHROID) 100 MCG Tab Take 1 Tab by mouth every day. 90 Tab 3   • vitamin D, Ergocalciferol, (DRISDOL) 61438 units Cap capsule Take 1 Cap by mouth every 7 days. 12 Cap 3   • ondansetron (ZOFRAN ODT) 4 MG TBDP Take 4 mg by mouth every 8 hours as needed. Indications: Nausea and Vomiting Following an Operation       No current facility-administered medications for this visit.      She  has a past medical history of Pain and Unspecified disorder of thyroid.  She  has a past surgical history that includes other abdominal surgery (/); ventral hernia repair (2011); and panniculectomy (2011).  Family History   Problem Relation Age of Onset   • Heart Disease Mother    • Cancer Mother      breast   • Diabetes Mother    • Other Father      AAA   • No Known Problems Sister    • Alcohol/Drug Brother    • No Known Problems Brother    • No Known Problems Sister      Family Status   Relation Status   • Mother    • Father    • Sister Alive   • Brother Alive   • Brother Alive   • Sister Alive     Social History   Substance Use Topics   • Smoking status: Never Smoker   • Smokeless tobacco: Never Used   • Alcohol use No     Social History     Social History Narrative   • No narrative on file         ROS  Constitutional ROS: No unexplained fevers, sweats, or chills  Eye ROS: No eye pain, redness, discharge  Ear ROS: No recent change in hearing  Mouth/Throat ROS: No recent change in voice or hoarseness  Neck ROS: No significant pain in neck  Pulmonary ROS: No chronic cough, sputum, or hemoptysis  Cardiovascular ROS: No chest pain, No shortness of breath  Gastrointestinal ROS: No nausea, vomiting, diarrhea, or constipation  Musculoskeletal/Extremities ROS: no morning  "stiffness  Hematologic/Lymphatic ROS: No coagulation disorder  Skin/Integumentary ROS: color, texture and temperature normal  Neurologic ROS: Normal development       Objective:     Blood pressure 136/88, pulse (!) 58, temperature 36.9 °C (98.5 °F), resp. rate 12, height 1.676 m (5' 6\"), weight 98 kg (216 lb), SpO2 97 %, not currently breastfeeding. Body mass index is 34.86 kg/m².  Physical Exam:    Vitals:    10/02/17 0810   BP: 136/88   Pulse: (!) 58   Resp: 12   Temp: 36.9 °C (98.5 °F)   SpO2: 97%   Weight: 98 kg (216 lb)   Height: 1.676 m (5' 6\")    Body mass index is 34.86 kg/m².    General/Constitutional: NAD not diaphoretic, comfortable  Eyes: clear conjunctiva, no scleral icterus, EOMI,  Ears, Nose, Mouth,Throat: no oral ulcers, good dentition, moist mucous membranes, no discharge from ears bilaterally  Cardiovascular: regular rate and rhythm.  No murmurs, gallops, rubs  Respiratory: normal effort, unlabored respiration.  On auscultation no wheezes, rales, rhonchi.  Clear to auscultation.  GI: soft, NTTP no hepatosplenomegaly, nondistended  Musculoskeletal  Axial:  Thoracic: no paraspinal tenderness  Lumbar: no paraspinal tenderness and no midline tenderness  Upper Extremities:  No synovitis of the PIP, DIP, MCP  Heberbon nodes appreciated at the DIP but her 5th DIP  Is flexed  Wrists and Elbows have good ROM  Mild tenderness at 120 degree abduction on the right shoulder  Lower Extremities:  No knee effusion bilateral, crepitus bilateral  No MTP tenderness bilateral  Gait is normal  Skin: Maxillary erythema that does extend into the nasolabial folds. Limited skin exam.  no rashes, no digital ulcerations, no alopecia, no tophi, no skin thickening, no nodules  Neuro: CN II-XII grossly intact, Alert, Oriented x 3, moves all four extremities  Psych: normal affect, normal mood, judgement appropriate, follows commands, responses are appropritae  Heme/Lymph: no cervical adenopathy    No results found for: " QNTTBGOLD  No results found for: HEPBCORTOT, HEPBCORIGM, HEPBSAG  No results found for: HEPCAB  Lab Results   Component Value Date/Time    SODIUM 138 01/09/2017 09:33 AM    POTASSIUM 4.2 01/09/2017 09:33 AM    CHLORIDE 104 01/09/2017 09:33 AM    CO2 28 01/09/2017 09:33 AM    GLUCOSE 88 01/09/2017 09:33 AM    BUN 17 01/09/2017 09:33 AM    CREATININE 0.83 01/09/2017 09:33 AM      Lab Results   Component Value Date/Time    WBC 3.6 (L) 04/06/2016 08:44 AM    RBC 4.49 04/06/2016 08:44 AM    HEMOGLOBIN 13.9 04/06/2016 08:44 AM    HEMATOCRIT 43.7 04/06/2016 08:44 AM    MCV 97.3 04/06/2016 08:44 AM    MCH 31.0 04/06/2016 08:44 AM    MCHC 31.8 (L) 04/06/2016 08:44 AM    MPV 10.3 04/06/2016 08:44 AM    NEUTSPOLYS 66.00 04/06/2016 08:44 AM    LYMPHOCYTES 21.20 (L) 04/06/2016 08:44 AM    MONOCYTES 7.80 04/06/2016 08:44 AM    EOSINOPHILS 4.20 04/06/2016 08:44 AM    BASOPHILS 0.80 04/06/2016 08:44 AM      Lab Results   Component Value Date/Time    CALCIUM 9.3 01/09/2017 09:33 AM    ASTSGOT 14 01/09/2017 09:33 AM    ALTSGPT 10 01/09/2017 09:33 AM    ALKPHOSPHAT 74 01/09/2017 09:33 AM    TBILIRUBIN 0.5 01/09/2017 09:33 AM    ALBUMIN 4.2 01/09/2017 09:33 AM    TOTPROTEIN 6.8 01/09/2017 09:33 AM     Lab Results   Component Value Date/Time    URICACID 5.9 11/04/2013 02:08 PM    RHEUMFACTN 8 11/04/2013 02:08 PM    CCPANTIBODY 3 11/04/2013 02:08 PM    ANTINUCAB None Detected 11/04/2013 02:08 PM     Lab Results   Component Value Date/Time    SEDRATEWES 24 11/04/2013 02:08 PM    CREACTPROT 0.30 11/04/2013 02:08 PM     No results found for: RUSSELVIPER, DRVVTINTERP  No results found for: F4TVCLSHPPK, N5KAACIPZKO, IGGCARDIOLI, IGMCARDIOLI, IGACARDIOLI, CRYOGLOBULIN  No results found for: ANADIRECT, ANTIDNADS, RNPAB, SMITHAB, ICWBDDT33, SSAROAB, SSBLAAB, PAOOFP0GG, CENTROMBAB  No results found for: ANTIDNADS, DSDNA, AGBMAB, GBMABA, ANCAIGG, F8QCIOFLLYI, JO1AB, RNPAB, ANTISSASJ, ANTISSBSJ  Lab Results   Component Value Date/Time    COLORURINE  Colorless 11/04/2013 02:08 PM    SPECGRAVITY 1.006 11/04/2013 02:08 PM    PHURINE 6.0 11/04/2013 02:08 PM    GLUCOSEUR Negative 11/04/2013 02:08 PM    KETONES Negative 11/04/2013 02:08 PM    PROTEINURIN Negative 11/04/2013 02:08 PM     No results found for: TOTPROTUR, TOTALVOLUME, UYAAVDLG99  No results found for: SSA60, SSA52  Lab Results   Component Value Date/Time    HBA1C 5.5 01/09/2017 09:33 AM     Lab Results   Component Value Date/Time    CPKTOTAL 94 11/04/2013 02:08 PM     No results found for: G6PD  No results found for: KITM76TLPL  No results found for: ACESERUM  Lab Results   Component Value Date/Time    25HYDROXY 58 01/09/2017 09:33 AM     No results found for: TSH, FREEDIR  Lab Results   Component Value Date/Time    TSHULTRASEN 0.790 01/09/2017 09:33 AM    FREET4 1.33 01/09/2017 09:33 AM     No results found for: MICROSOMALA, ANTITHYROGL  No results found for: IGGLYMEABS  No results found for: ANTIMITOCHO, FACTIN  No results found for: IGA, TTRANSIGA, ENDOIGA  No results found for: FLTYPE, CRYSTALSBDF  No results found for: ISTATICAL  No results found for: ISTATCREAT  No results found for: CTELOPEP  No results found for: GBMABG  No results found for: PTHINTACT  Results for orders placed in visit on 11/04/13   DX-JOINT SURVEY-HANDS SINGLE VIEW    Impression Bilateral osteoarthritis.            INTERPRETING LOCATION: [address] [place] [address], [id number]     Results for orders placed in visit on 11/04/13   DX-JOINT SURVEY-FEET SINGLE VIEW    Impression Unremarkable examination.            INTERPRETING LOCATION: [address] [place] [address], [id number]             Results for orders placed in visit on 11/04/13   DX-KNEES-AP BILATERAL STANDING    Impression Bilateral medial compartment osteoarthritis, right greater than left.            INTERPRETING LOCATION: [address] [place] [address], [id number]              Results for orders placed in visit on 11/04/13   DX-KNEE 2-    Impression No evidence of  subluxation with apparent mild lateral posterior spurring.            INTERPRETING LOCATION: [address] [place] [address], [place], [id number]                   Results for orders placed in visit on 04/13/14   DX-CERVICAL SPINE-2 OR 3 VIEWS    Impression Normal cervical spine.             Assessment and Plan:  Ms. Freddy Smith is here to re-establish care for her osteoarthritis.  She was diagnosed back in 2013 with Dr. Melo.  Physical exam I do appreciate some progression of her disease and she herself does report increased limitations in her joints. Today we spent a large portion of the time discussing care and protection of joints. She is noticing some right shoulder pain which I suspect could be more of a rotator cuff tendinopathy. I have provided her with exercises to do. I also educated her on importance of ergonomics.  In terms of medication that she is using very limiting medications and really is only taking an aspirin intermittently every few weeks.  As I am noticing more prominent DIPs I we will order a hand x-ray to follow for concern that there could be a component of erosive osteoarthritis. She otherwise has no symptoms of inflammatory arthropathy including morning stiffness or joint swelling. There is a family history of JRA and so we will continue to monitor her closely. On her initial evaluation with our clinic her rheumatoid factor and CORY was negative.    Last bone density was in 2013 and so we will repeat that again. I have advised the patient take calcium and vitamin D. We will provide her with some supplements.  Her vitamin D was checked in the beginning of this year and was noted to be normal.    I will see her again in 6 months if she continues to be stable I will review exercises with her and then see her annually.    1. Generalized osteoarthritis  DX-JOINT SURVEY-HANDS SINGLE VIEW    DS-BONE DENSITY STUDY (DEXA)   2. Disorder of bone density and structure, unspecified           Records  requested.  Followup: No Follow-up on file. or sooner prn  Patient was seen 60 minutes face-to-face (excluding time for procedures)  of which more than 50% the time was spent counseling the patient regarding  rheumatological conditions and care. Therapy was discussed in detail.  Thank you for this referral.

## 2017-10-31 ENCOUNTER — HOSPITAL ENCOUNTER (OUTPATIENT)
Dept: RADIOLOGY | Facility: MEDICAL CENTER | Age: 62
End: 2017-10-31
Attending: INTERNAL MEDICINE
Payer: COMMERCIAL

## 2017-10-31 DIAGNOSIS — M15.9 GENERALIZED OSTEOARTHRITIS: ICD-10-CM

## 2017-10-31 PROCEDURE — 77080 DXA BONE DENSITY AXIAL: CPT

## 2017-11-15 ENCOUNTER — HOSPITAL ENCOUNTER (OUTPATIENT)
Dept: RADIOLOGY | Facility: MEDICAL CENTER | Age: 62
End: 2017-11-15
Attending: INTERNAL MEDICINE
Payer: COMMERCIAL

## 2017-11-15 DIAGNOSIS — M15.9 GENERALIZED OSTEOARTHRITIS: ICD-10-CM

## 2017-11-15 PROCEDURE — 77077 JOINT SURVEY SINGLE VIEW: CPT

## 2018-02-09 ENCOUNTER — OFFICE VISIT (OUTPATIENT)
Dept: MEDICAL GROUP | Facility: CLINIC | Age: 63
End: 2018-02-09
Payer: COMMERCIAL

## 2018-02-09 VITALS
RESPIRATION RATE: 16 BRPM | BODY MASS INDEX: 35.99 KG/M2 | SYSTOLIC BLOOD PRESSURE: 110 MMHG | DIASTOLIC BLOOD PRESSURE: 70 MMHG | WEIGHT: 216 LBS | HEIGHT: 65 IN | HEART RATE: 65 BPM | TEMPERATURE: 98.5 F | OXYGEN SATURATION: 95 %

## 2018-02-09 DIAGNOSIS — S39.92XA INJURY OF LOW BACK, INITIAL ENCOUNTER: ICD-10-CM

## 2018-02-09 PROCEDURE — 99213 OFFICE O/P EST LOW 20 MIN: CPT | Performed by: NURSE PRACTITIONER

## 2018-02-09 RX ORDER — ACETAMINOPHEN 500 MG
500-1000 TABLET ORAL EVERY 6 HOURS PRN
COMMUNITY
End: 2019-10-24

## 2018-02-09 RX ORDER — CYCLOBENZAPRINE HCL 5 MG
5-10 TABLET ORAL 3 TIMES DAILY PRN
Qty: 30 TAB | Refills: 0 | Status: SHIPPED | OUTPATIENT
Start: 2018-02-09 | End: 2018-11-21

## 2018-02-09 ASSESSMENT — ENCOUNTER SYMPTOMS
MYALGIAS: 1
FEVER: 0
FLANK PAIN: 0
FALLS: 0
BACK PAIN: 1
CHILLS: 0

## 2018-02-09 NOTE — PATIENT INSTRUCTIONS
Muscle Strain  A muscle strain is an injury that occurs when a muscle is stretched beyond its normal length. Usually a small number of muscle fibers are torn when this happens. Muscle strain is rated in degrees. First-degree strains have the least amount of muscle fiber tearing and pain. Second-degree and third-degree strains have increasingly more tearing and pain.   Usually, recovery from muscle strain takes 1-2 weeks. Complete healing takes 5-6 weeks.   CAUSES   Muscle strain happens when a sudden, violent force placed on a muscle stretches it too far. This may occur with lifting, sports, or a fall.   RISK FACTORS  Muscle strain is especially common in athletes.   SIGNS AND SYMPTOMS  At the site of the muscle strain, there may be:  · Pain.  · Bruising.  · Swelling.  · Difficulty using the muscle due to pain or lack of normal function.  DIAGNOSIS   Your health care provider will perform a physical exam and ask about your medical history.  TREATMENT   Often, the best treatment for a muscle strain is resting, icing, and applying cold compresses to the injured area.    HOME CARE INSTRUCTIONS   · Use the PRICE method of treatment to promote muscle healing during the first 2-3 days after your injury. The PRICE method involves:  ¨ Protecting the muscle from being injured again.  ¨ Restricting your activity and resting the injured body part.  ¨ Icing your injury. To do this, put ice in a plastic bag. Place a towel between your skin and the bag. Then, apply the ice and leave it on from 15-20 minutes each hour. After the third day, switch to moist heat packs.  ¨ Apply compression to the injured area with a splint or elastic bandage. Be careful not to wrap it too tightly. This may interfere with blood circulation or increase swelling.  ¨ Elevate the injured body part above the level of your heart as often as you can.  · Only take over-the-counter or prescription medicines for pain, discomfort, or fever as directed by your  health care provider.  · Warming up prior to exercise helps to prevent future muscle strains.  SEEK MEDICAL CARE IF:   · You have increasing pain or swelling in the injured area.  · You have numbness, tingling, or a significant loss of strength in the injured area.  MAKE SURE YOU:   · Understand these instructions.  · Will watch your condition.  · Will get help right away if you are not doing well or get worse.     This information is not intended to replace advice given to you by your health care provider. Make sure you discuss any questions you have with your health care provider.     Document Released: 12/18/2006 Document Revised: 10/08/2014 Document Reviewed: 07/17/2014  [employer] Interactive Patient Education ©2016 [employer]

## 2018-02-09 NOTE — PROGRESS NOTES
Chief Complaint   Patient presents with   • Back Pain       HISTORY OF PRESENT ILLNESS: Patient is a 62 y.o. female established patient who presents today due to a week hx of lower back pain. Pt reports she does not feel it is related to heavy lifting. She is doing heavy lifting every day without problem. She has a new mattress recently and she thinks she might twist her back on the new mattress. She is taking tylenol two tablets per day with some relief but she tries to limit that to once per day because she does not like to take too many medications and she has high tolerance to the pain. She is here today is because she is worried about the travel next week. She is going to travel to [place] to see her grandchildren and they are going to play a lot on the floor. She would like to have something just in case for muscle pain or spasm. She reports she is now also using the patch she got from [facility] with NSAIDs in there.       Patient Active Problem List    Diagnosis Date Noted   • Obesity (BMI 30-39.9) 01/09/2017   • Elevated fasting glucose 01/09/2017   • Low serum vitamin D 01/09/2017   • Generalized osteoarthritis 05/01/2014   • Hypothyroidism 05/01/2014   • Lower back injury 05/01/2014       Allergies:Patient has no known allergies.    Current Outpatient Prescriptions   Medication Sig Dispense Refill   • acetaminophen (TYLENOL) 500 MG Tab Take 500-1,000 mg by mouth every 6 hours as needed.     • cyclobenzaprine (FLEXERIL) 5 MG tablet Take 1-2 Tabs by mouth 3 times a day as needed. 30 Tab 0   • calcium carbonate (OS-YORDAN 500) 500 MG Tab Take 1 Tab by mouth 2 times a day, with meals. 60 Tab 11   • levothyroxine (SYNTHROID) 100 MCG Tab Take 1 Tab by mouth every day. 90 Tab 3   • vitamin D, Ergocalciferol, (DRISDOL) 55643 units Cap capsule Take 1 Cap by mouth every 7 days. 12 Cap 3     No current facility-administered medications for this visit.        Social History   Substance Use Topics   • Smoking status: Never  "Smoker   • Smokeless tobacco: Never Used   • Alcohol use No       Family Status   Relation Status   • Mother    • Father    • Sister Alive   • Brother Alive   • Brother Alive   • Sister Alive     Family History   Problem Relation Age of Onset   • Heart Disease Mother    • Cancer Mother      breast   • Diabetes Mother    • Other Father      AAA   • No Known Problems Sister    • Alcohol/Drug Brother    • No Known Problems Brother    • No Known Problems Sister          ROS:  Review of Systems   Constitutional: Negative for chills and fever.   Genitourinary: Negative for dysuria, flank pain, frequency, hematuria and urgency ( not really urgency but when she has to go she has to go right away due to her underlying incontinence per pt.).   Musculoskeletal: Positive for back pain and myalgias. Negative for falls.        Objective:     Blood pressure 110/70, pulse 65, temperature 36.9 °C (98.5 °F), resp. rate 16, height 1.651 m (5' 5\"), weight 98 kg (216 lb), SpO2 95 %.     Physical Exam:  Physical Exam   Constitutional: She is oriented to person, place, and time and well-developed, well-nourished, and in no distress.   HENT:   Head: Normocephalic and atraumatic.   Eyes: Conjunctivae are normal.   Neck: Neck supple. No JVD present.   Cardiovascular: Normal rate and regular rhythm.    Pulmonary/Chest: Effort normal and breath sounds normal.   Musculoskeletal: Normal range of motion. She exhibits tenderness (localized to left to mid paralumbar spine).   Neurological: She is alert and oriented to person, place, and time.   Skin: Skin is warm. No erythema.   Vitals reviewed.        Assessment/Plan:  1. Injury of low back, initial encounter  Pt is instructed to wear the brace if she has to bend and play with kids a lot on the floor. She can also take tylenol or ibuprofen 15-20 minutes before playing. She can take flexeril if muscle spasm or at night time.   - cyclobenzaprine (FLEXERIL) 5 MG tablet; Take 1-2 Tabs " by mouth 3 times a day as needed.  Dispense: 30 Tab; Refill: 0  - Pt education for muscle strain hand out is provided to pt  - if pain persists for more than 3 months, image test will be needed (pt is going to [place], unable to work with PT at this time. She can consider PT after she comes back)    Differential diagnoses and indications for immediate follow-up discussed with patient.    Instructed to return to clinic or nearest emergency department for any change in condition, further concerns, or worsening of symptoms.    The patient demonstrated a good understanding and agreed with the treatment plan.

## 2018-04-02 ENCOUNTER — OFFICE VISIT (OUTPATIENT)
Dept: RHEUMATOLOGY | Facility: PHYSICIAN GROUP | Age: 63
End: 2018-04-02
Payer: COMMERCIAL

## 2018-04-02 VITALS
SYSTOLIC BLOOD PRESSURE: 110 MMHG | BODY MASS INDEX: 35.82 KG/M2 | TEMPERATURE: 98.2 F | DIASTOLIC BLOOD PRESSURE: 70 MMHG | WEIGHT: 215 LBS | OXYGEN SATURATION: 93 % | HEIGHT: 65 IN | RESPIRATION RATE: 12 BRPM | HEART RATE: 54 BPM

## 2018-04-02 DIAGNOSIS — M25.561 RIGHT KNEE PAIN, UNSPECIFIED CHRONICITY: ICD-10-CM

## 2018-04-02 DIAGNOSIS — M15.9 GENERALIZED OSTEOARTHRITIS: ICD-10-CM

## 2018-04-02 PROCEDURE — 99214 OFFICE O/P EST MOD 30 MIN: CPT | Performed by: INTERNAL MEDICINE

## 2018-04-02 ASSESSMENT — PAIN SCALES - GENERAL: PAINLEVEL: NO PAIN

## 2018-04-02 ASSESSMENT — ENCOUNTER SYMPTOMS
SHORTNESS OF BREATH: 0
CHILLS: 0
SPUTUM PRODUCTION: 0
FEVER: 0

## 2018-04-02 NOTE — LETTER
[facility]   [address], [address]  [place] [address]  Phone: [phone]  Fax: [phone]              Encounter Date: 4/2/2018    Dear Dr. Gutierrez ref. provider found,    It was a pleasure seeing your patient, Freddy Smith, on 4/2/2018. Diagnoses of Right knee pain, unspecified chronicity and Generalized osteoarthritis were pertinent to this visit.     Please find attached progress note which includes the history I obtained from Ms. Smith, my physical examination findings, my impression and recommendations.      Once again, it was a pleasure participating in your patient's care.  Please feel free to contact me if you have any questions or if I can be of any further assistance to your patients.      Sincerely,    Lidia Mercer M.D.  Electronically Signed          PROGRESS NOTE:  Subjective:   Date of Consultation:4/2/2018  7:10 AM  Primary care physician:Rose Pederson D.O.      Reason for Consultation:  Ms. Smith  is a pleasant 62 y.o. year old female who presented with follow-up for osteoarthritis    Osteoarthritis  Doing well with no pain  She only has intermittent pain in the right knee when she kneels during prayer on a hard surface.   No pain in the CMC even though xray shows radial subluxation at the CMC    RHEUM HISTORY  I first met Ms. Freddy Smith 10/2/2017 for re-establishment of her care for Osteoarthritis.  She is a previous patient of Dr. Melo.        RHEUM HISTORY  She has a past medical history of hypothyroidism and first presented to Dr. Bruton in March 2013 with a history of an old right knee injury. In addition she is also on appetite and noticed changes in the DIP particularly the left DIP on evaluation Dr. smith she had hypothyroidism, generalized osteoarthritis affecting the knees as well as joints of the hands on her follow-up visit in March correction and 11 2013 she was just taking intermittent anti-inflammatory or Tylenol. Her hands  look like they were degenerative arthritis with early Heberden's nodes bilaterally she still had good gastritis and strength. She was advised to follow-up and to do regular exercise program including swimming.        She does reports about 5-6 months ago she had aches and pain and would take an aspirin fo rher pain. She take aspirin 1-2 times a week.  She is sleeping well.  She can carry up to 100 lb and is very active.  She is no longer swimming.  She reports it is cost prohibitive and does not have time to get to the gym.    She reports with her busy lifestlye she will incorporate exercises into her daily activities.      She has been doing exercises on how to strengthen the knee.  She reports the exercises do help and she does the exercises several times a week.      She received a very active lifestyle denies any fatigue, morning stiffness, swollen joints. She denies any joint pain however does know that she has some crackles when she squats or bends with her knees. She does feel like she has some low back. However these pains are far and few between intermittent and really requires intermittent use of aspirin. She denies any rash.  She denies any dry mouth. She denies any hair loss. She denies any persistent headaches.      Results for CAITY SOILS (MRN [id number]) as of 10/2/2017 08:33    Ref. Range 11/4/2013 14:08   Rheumatoid Factor -Neph- Latest Ref Range: 0 - 14 IU/mL 8   Stat C-Reactive Protein Latest Ref Range: 0.00 - 0.75 mg/dL 0.30   Ccp Antibodies Latest Ref Range: 0 - 19 Units 3   Antinuclear Antibody Latest Ref Range: None Detected  None Detected      11/4/2013 knee xray bilateral showed bilateral medical compartment OA R>>L    11/15/2017 hand xray notes bilateral, first CMC and PIP and DIP OA with associated radial subluxations at the first CMC.  No erosions no perarticular calcification.     She had a DEXA scan performed 12/13/2013 and this showed in the lumbar spine at bone mineral density  of 1.2 g/cm² with a T score of +1.4 and her femoral neck left was 0.847 g/cm² with a T score of 0.0 and a total hip on the left was 1.002 g/cm² with a T score of +0.5. At that time her 10 year probability of major fracture was estimated to be at 5%    Her most recent DEXA 10/31/2017 noted bone mineral density was normal  The mean bone mineral density for the lumbar spine is 1.391 g/cm2, which corresponds to a T score of 1.8 and a Z score of 3.0.  The proximal left femur has a mean bone mineral density of 1.107 g/cm2, with a T score of 0.8 and a Z score of 1.7.     Past Medical History:  Hypothryoidism, she is pre-diabetic     Family History: sister has JRA, mother had MI and cancer, DM, father had thoracic aneurysm; grandmother had thoracic aneurysm     Social History:  Islamic Adventist, norweigen ethnic background, non smoker, and never chewed tobacco            Past Medical History:   Diagnosis Date   • Pain     abd pain   • Unspecified disorder of thyroid      Past Surgical History:   Procedure Laterality Date   • VENTRAL HERNIA REPAIR  9/7/2011    Performed by SAVANAH QUIÑONES at SURGERY [facility] ORS   • PANNICULECTOMY  9/7/2011    Performed by SAVANAH QUIÑONES at SURGERY [facility] ORS   • OTHER ABDOMINAL SURGERY  1990/2001/2006     incisional hernia repair     No Known Allergies  Outpatient Encounter Prescriptions as of 4/2/2018   Medication Sig Dispense Refill   • levothyroxine (SYNTHROID) 100 MCG Tab Take 1 Tab by mouth every day. 90 Tab 3   • vitamin D, Ergocalciferol, (DRISDOL) 55272 units Cap capsule Take 1 Cap by mouth every 7 days. 12 Cap 3   • acetaminophen (TYLENOL) 500 MG Tab Take 500-1,000 mg by mouth every 6 hours as needed.     • cyclobenzaprine (FLEXERIL) 5 MG tablet Take 1-2 Tabs by mouth 3 times a day as needed. 30 Tab 0   • calcium carbonate (OS-YORDAN 500) 500 MG Tab Take 1 Tab by mouth 2 times a day, with meals. 60 Tab 11     No facility-administered encounter medications on file as of 4/2/2018.   "    @IMS@  Social History     Social History   • Marital status:      Spouse name: N/A   • Number of children: N/A   • Years of education: N/A     Occupational History   • Not on file.     Social History Main Topics   • Smoking status: Never Smoker   • Smokeless tobacco: Never Used   • Alcohol use No   • Drug use: No   • Sexual activity: Not on file     Other Topics Concern   • Not on file     Social History Narrative   • No narrative on file      History   Smoking Status   • Never Smoker   Smokeless Tobacco   • Never Used     History   Alcohol Use No     History   Drug Use No      OB History   No data available      No LMP recorded.    G P A L     Family History   Problem Relation Age of Onset   • Heart Disease Mother    • Cancer Mother      breast   • Diabetes Mother    • Other Father      AAA   • No Known Problems Sister    • Alcohol/Drug Brother    • No Known Problems Brother    • No Known Problems Sister        HPI    Review of Systems   Constitutional: Negative for chills and fever.   Respiratory: Negative for sputum production and shortness of breath.    Musculoskeletal: Positive for joint pain.        Objective:   /70   Pulse (!) 54   Temp 36.8 °C (98.2 °F)   Resp 12   Ht 1.651 m (5' 5\")   Wt 97.5 kg (215 lb)   SpO2 93%   BMI 35.78 kg/m²      Physical Exam   Constitutional: She is oriented to person, place, and time. She appears well-developed and well-nourished. No distress.   HENT:   Head: Normocephalic and atraumatic.   Right Ear: External ear normal.   Left Ear: External ear normal.   Eyes: Conjunctivae are normal. Right eye exhibits no discharge. Left eye exhibits no discharge. No scleral icterus.   Cardiovascular: Normal rate.    Pulmonary/Chest: Breath sounds normal. No stridor. No respiratory distress.   Musculoskeletal: She exhibits no edema.   No tenderness at the CMC. No active synovitis in the MCP, PIP,  No swelling in the wrists.  DIP does have Heberdon nodes.  5th IP on the " right hand has heberdon nodes and flexed   Neurological: She is alert and oriented to person, place, and time.   Skin: She is not diaphoretic.   Psychiatric: She has a normal mood and affect. Her behavior is normal. Thought content normal.       Assessment:     1. Right knee pain, unspecified chronicity  DX-KNEE 3 VIEWS RIGHT   2. Generalized osteoarthritis       Labs:      No results found for: QNTTBGOLD  No results found for: HEPBCORTOT, HEPBCORIGM, HEPBSAG  No results found for: HEPCAB  Lab Results   Component Value Date/Time    SODIUM 138 01/09/2017 09:33 AM    POTASSIUM 4.2 01/09/2017 09:33 AM    CHLORIDE 104 01/09/2017 09:33 AM    CO2 28 01/09/2017 09:33 AM    GLUCOSE 88 01/09/2017 09:33 AM    BUN 17 01/09/2017 09:33 AM    CREATININE 0.83 01/09/2017 09:33 AM      Lab Results   Component Value Date/Time    WBC 3.6 (L) 04/06/2016 08:44 AM    RBC 4.49 04/06/2016 08:44 AM    HEMOGLOBIN 13.9 04/06/2016 08:44 AM    HEMATOCRIT 43.7 04/06/2016 08:44 AM    MCV 97.3 04/06/2016 08:44 AM    MCH 31.0 04/06/2016 08:44 AM    MCHC 31.8 (L) 04/06/2016 08:44 AM    MPV 10.3 04/06/2016 08:44 AM    NEUTSPOLYS 66.00 04/06/2016 08:44 AM    LYMPHOCYTES 21.20 (L) 04/06/2016 08:44 AM    MONOCYTES 7.80 04/06/2016 08:44 AM    EOSINOPHILS 4.20 04/06/2016 08:44 AM    BASOPHILS 0.80 04/06/2016 08:44 AM      Lab Results   Component Value Date/Time    CALCIUM 9.3 01/09/2017 09:33 AM    ASTSGOT 14 01/09/2017 09:33 AM    ALTSGPT 10 01/09/2017 09:33 AM    ALKPHOSPHAT 74 01/09/2017 09:33 AM    TBILIRUBIN 0.5 01/09/2017 09:33 AM    ALBUMIN 4.2 01/09/2017 09:33 AM    TOTPROTEIN 6.8 01/09/2017 09:33 AM     Lab Results   Component Value Date/Time    URICACID 5.9 11/04/2013 02:08 PM    RHEUMFACTN 8 11/04/2013 02:08 PM    CCPANTIBODY 3 11/04/2013 02:08 PM    ANTINUCAB None Detected 11/04/2013 02:08 PM     Lab Results   Component Value Date/Time    SEDRATEWES 24 11/04/2013 02:08 PM    CREACTPROT 0.30 11/04/2013 02:08 PM     No results found for:  DR LYNDONVVTINTERP  No results found for: D2ZRUCOBDYX, Y8LRYUVJMND, IGGCARDIOLI, IGMCARDIOLI, IGACARDIOLI, CRYOGLOBULIN  No results found for: ANADIRECT, ANTIDNADS, RNPAB, SMITHAB, JKUEGFV41, SSAROAB, SSBLAAB, WEMIZH4ZZ, CENTROMBAB  No results found for: ANTIDNADS, DSDNA, AGBMAB, GBMABA, ANCAIGG, D2YDQMGJVUO, JO1AB, RNPAB, ANTISSASJ, ANTISSBSJ  Lab Results   Component Value Date/Time    COLORURINE Colorless 11/04/2013 02:08 PM    SPECGRAVITY 1.006 11/04/2013 02:08 PM    PHURINE 6.0 11/04/2013 02:08 PM    GLUCOSEUR Negative 11/04/2013 02:08 PM    KETONES Negative 11/04/2013 02:08 PM    PROTEINURIN Negative 11/04/2013 02:08 PM     No results found for: TOTPROTUR, TOTALVOLUME, SOEANQUI08  No results found for: SSA60, SSA52  Lab Results   Component Value Date/Time    HBA1C 5.5 01/09/2017 09:33 AM     Lab Results   Component Value Date/Time    CPKTOTAL 94 11/04/2013 02:08 PM     No results found for: G6PD  No results found for: [id number]  No results found for: ACESERUM  Lab Results   Component Value Date/Time    25HYDROXY 58 01/09/2017 09:33 AM     No results found for: TSH, FREEDIR  Lab Results   Component Value Date/Time    TSHULTRASEN 0.790 01/09/2017 09:33 AM    FREET4 1.33 01/09/2017 09:33 AM     No results found for: MICROSOMALA, ANTITHYROGL  No results found for: IGGLYMEABS  No results found for: ANTIMITOCHO, FACTIN  No results found for: IGA, TTRANSIGA, ENDOIGA  No results found for: FLTYPE, CRYSTALSBDF  No results found for: ISTATICAL  No results found for: ISTATCREAT  No results found for: CTELOPEP  No results found for: GBMABG  No results found for: PTHINTACT      Medical Decision Making:  Today's Assessment / Status / Plan:     Osteoarthritis  Stable  Provided patient with education and hand exercises  Advised patien tto return sooner if it bothers her  For her knees again this sounds mechanical.  Will order right knee xray    1. Right knee pain, unspecified chronicity  DX-KNEE 3 VIEWS RIGHT   2.  Generalized osteoarthritis         I have spent greater than 50% of this 30 minute visit in counseling/coordination of care with the patient regarding therapy plan and signs/symptoms to return sooner, as well as education regarding hand exercises.    She agreed and verbalized her agreement and understanding with the current plan. I answered all questions and concerns she has at this time and advised her to call at any time in there interim with questions or concerns in regards to her care.    Thank you for allowing me to participate in her care, I will continue to follow closely.

## 2018-04-02 NOTE — PROGRESS NOTES
Subjective:   Date of Consultation:4/2/2018  7:10 AM  Primary care physician:Rose Pederson D.O.      Reason for Consultation:  Ms. Smith  is a pleasant 62 y.o. year old female who presented with follow-up for osteoarthritis    Osteoarthritis  Doing well with no pain  She only has intermittent pain in the right knee when she kneels during prayer on a hard surface.   No pain in the CMC even though xray shows radial subluxation at the CMC    RHEUM HISTORY  I first met Ms. Freddy Smith 10/2/2017 for re-establishment of her care for Osteoarthritis.  She is a previous patient of Dr. Melo.        RHEUM HISTORY  She has a past medical history of hypothyroidism and first presented to Dr. Bruton in March 2013 with a history of an old right knee injury. In addition she is also on appetite and noticed changes in the DIP particularly the left DIP on evaluation Dr. smith she had hypothyroidism, generalized osteoarthritis affecting the knees as well as joints of the hands on her follow-up visit in March correction and 11 2013 she was just taking intermittent anti-inflammatory or Tylenol. Her hands look like they were degenerative arthritis with early Heberden's nodes bilaterally she still had good gastritis and strength. She was advised to follow-up and to do regular exercise program including swimming.        She does reports about 5-6 months ago she had aches and pain and would take an aspirin fo rher pain. She take aspirin 1-2 times a week.  She is sleeping well.  She can carry up to 100 lb and is very active.  She is no longer swimming.  She reports it is cost prohibitive and does not have time to get to the gym.    She reports with her busy lifestlye she will incorporate exercises into her daily activities.      She has been doing exercises on how to strengthen the knee.  She reports the exercises do help and she does the exercises several times a week.      She received a very active lifestyle denies any  fatigue, morning stiffness, swollen joints. She denies any joint pain however does know that she has some crackles when she squats or bends with her knees. She does feel like she has some low back. However these pains are far and few between intermittent and really requires intermittent use of aspirin. She denies any rash.  She denies any dry mouth. She denies any hair loss. She denies any persistent headaches.      Results for CAITY SOLIS (MRN [id number]) as of 10/2/2017 08:33    Ref. Range 11/4/2013 14:08   Rheumatoid Factor -Neph- Latest Ref Range: 0 - 14 IU/mL 8   Stat C-Reactive Protein Latest Ref Range: 0.00 - 0.75 mg/dL 0.30   Ccp Antibodies Latest Ref Range: 0 - 19 Units 3   Antinuclear Antibody Latest Ref Range: None Detected  None Detected      11/4/2013 knee xray bilateral showed bilateral medical compartment OA R>>L    11/15/2017 hand xray notes bilateral, first CMC and PIP and DIP OA with associated radial subluxations at the first CMC.  No erosions no perarticular calcification.     She had a DEXA scan performed 12/13/2013 and this showed in the lumbar spine at bone mineral density of 1.2 g/cm² with a T score of +1.4 and her femoral neck left was 0.847 g/cm² with a T score of 0.0 and a total hip on the left was 1.002 g/cm² with a T score of +0.5. At that time her 10 year probability of major fracture was estimated to be at 5%    Her most recent DEXA 10/31/2017 noted bone mineral density was normal  The mean bone mineral density for the lumbar spine is 1.391 g/cm2, which corresponds to a T score of 1.8 and a Z score of 3.0.  The proximal left femur has a mean bone mineral density of 1.107 g/cm2, with a T score of 0.8 and a Z score of 1.7.     Past Medical History:  Hypothryoidism, she is pre-diabetic     Family History: sister has JRA, mother had MI and cancer, DM, father had thoracic aneurysm; grandmother had thoracic aneurysm     Social History:  Islamic Gnosticist, norweigen ethnic background,  non smoker, and never chewed tobacco            Past Medical History:   Diagnosis Date   • Pain     abd pain   • Unspecified disorder of thyroid      Past Surgical History:   Procedure Laterality Date   • VENTRAL HERNIA REPAIR  9/7/2011    Performed by SAVANAH QUIÑONES at SURGERY [facility] ORS   • PANNICULECTOMY  9/7/2011    Performed by SAVANAH QUIÑONES at SURGERY [facility] ORS   • OTHER ABDOMINAL SURGERY  1990/2001/2006     incisional hernia repair     No Known Allergies  Outpatient Encounter Prescriptions as of 4/2/2018   Medication Sig Dispense Refill   • levothyroxine (SYNTHROID) 100 MCG Tab Take 1 Tab by mouth every day. 90 Tab 3   • vitamin D, Ergocalciferol, (DRISDOL) 43356 units Cap capsule Take 1 Cap by mouth every 7 days. 12 Cap 3   • acetaminophen (TYLENOL) 500 MG Tab Take 500-1,000 mg by mouth every 6 hours as needed.     • cyclobenzaprine (FLEXERIL) 5 MG tablet Take 1-2 Tabs by mouth 3 times a day as needed. 30 Tab 0   • calcium carbonate (OS-YORDAN 500) 500 MG Tab Take 1 Tab by mouth 2 times a day, with meals. 60 Tab 11     No facility-administered encounter medications on file as of 4/2/2018.      @[facility]@  Social History     Social History   • Marital status:      Spouse name: N/A   • Number of children: N/A   • Years of education: N/A     Occupational History   • Not on file.     Social History Main Topics   • Smoking status: Never Smoker   • Smokeless tobacco: Never Used   • Alcohol use No   • Drug use: No   • Sexual activity: Not on file     Other Topics Concern   • Not on file     Social History Narrative   • No narrative on file      History   Smoking Status   • Never Smoker   Smokeless Tobacco   • Never Used     History   Alcohol Use No     History   Drug Use No      OB History   No data available      No LMP recorded.    G P A L     Family History   Problem Relation Age of Onset   • Heart Disease Mother    • Cancer Mother      breast   • Diabetes Mother    • Other Father      AAA   • No Known  "Problems Sister    • Alcohol/Drug Brother    • No Known Problems Brother    • No Known Problems Sister        HPI    Review of Systems   Constitutional: Negative for chills and fever.   Respiratory: Negative for sputum production and shortness of breath.    Musculoskeletal: Positive for joint pain.        Objective:   /70   Pulse (!) 54   Temp 36.8 °C (98.2 °F)   Resp 12   Ht 1.651 m (5' 5\")   Wt 97.5 kg (215 lb)   SpO2 93%   BMI 35.78 kg/m²     Physical Exam   Constitutional: She is oriented to person, place, and time. She appears well-developed and well-nourished. No distress.   HENT:   Head: Normocephalic and atraumatic.   Right Ear: External ear normal.   Left Ear: External ear normal.   Eyes: Conjunctivae are normal. Right eye exhibits no discharge. Left eye exhibits no discharge. No scleral icterus.   Cardiovascular: Normal rate.    Pulmonary/Chest: Breath sounds normal. No stridor. No respiratory distress.   Musculoskeletal: She exhibits no edema.   No tenderness at the CMC. No active synovitis in the MCP, PIP,  No swelling in the wrists.  DIP does have Heberdon nodes.  5th IP on the right hand has heberdon nodes and flexed   Neurological: She is alert and oriented to person, place, and time.   Skin: She is not diaphoretic.   Psychiatric: She has a normal mood and affect. Her behavior is normal. Thought content normal.       Assessment:     1. Right knee pain, unspecified chronicity  DX-KNEE 3 VIEWS RIGHT   2. Generalized osteoarthritis       Labs:      No results found for: QNTTBGOLD  No results found for: HEPBCORTOT, HEPBCORIGM, HEPBSAG  No results found for: HEPCAB  Lab Results   Component Value Date/Time    SODIUM 138 01/09/2017 09:33 AM    POTASSIUM 4.2 01/09/2017 09:33 AM    CHLORIDE 104 01/09/2017 09:33 AM    CO2 28 01/09/2017 09:33 AM    GLUCOSE 88 01/09/2017 09:33 AM    BUN 17 01/09/2017 09:33 AM    CREATININE 0.83 01/09/2017 09:33 AM      Lab Results   Component Value Date/Time    WBC " 3.6 (L) 04/06/2016 08:44 AM    RBC 4.49 04/06/2016 08:44 AM    HEMOGLOBIN 13.9 04/06/2016 08:44 AM    HEMATOCRIT 43.7 04/06/2016 08:44 AM    MCV 97.3 04/06/2016 08:44 AM    MCH 31.0 04/06/2016 08:44 AM    MCHC 31.8 (L) 04/06/2016 08:44 AM    MPV 10.3 04/06/2016 08:44 AM    NEUTSPOLYS 66.00 04/06/2016 08:44 AM    LYMPHOCYTES 21.20 (L) 04/06/2016 08:44 AM    MONOCYTES 7.80 04/06/2016 08:44 AM    EOSINOPHILS 4.20 04/06/2016 08:44 AM    BASOPHILS 0.80 04/06/2016 08:44 AM      Lab Results   Component Value Date/Time    CALCIUM 9.3 01/09/2017 09:33 AM    ASTSGOT 14 01/09/2017 09:33 AM    ALTSGPT 10 01/09/2017 09:33 AM    ALKPHOSPHAT 74 01/09/2017 09:33 AM    TBILIRUBIN 0.5 01/09/2017 09:33 AM    ALBUMIN 4.2 01/09/2017 09:33 AM    TOTPROTEIN 6.8 01/09/2017 09:33 AM     Lab Results   Component Value Date/Time    URICACID 5.9 11/04/2013 02:08 PM    RHEUMFACTN 8 11/04/2013 02:08 PM    CCPANTIBODY 3 11/04/2013 02:08 PM    ANTINUCAB None Detected 11/04/2013 02:08 PM     Lab Results   Component Value Date/Time    SEDRATEWES 24 11/04/2013 02:08 PM    CREACTPROT 0.30 11/04/2013 02:08 PM     No results found for: RUSSELVIPER, DRVVTINTERP  No results found for: [id number], T4ANKQJDWFO, IGGCARDIOLI, IGMCARDIOLI, IGACARDIOLI, CRYOGLOBULIN  No results found for: ANADIRECT, ANTIDNADS, RNPAB, SMITHAB, KOPULFZ86, SSAROAB, SSBLAAB, NNFPIP6ED, CENTROMBAB  No results found for: ANTIDNADS, DSDNA, AGBMAB, GBMABA, ANCAIGG, [id number], JO1AB, RNPAB, ANTISSASJ, ANTISSBSJ  Lab Results   Component Value Date/Time    COLORURINE Colorless 11/04/2013 02:08 PM    SPECGRAVITY 1.006 11/04/2013 02:08 PM    PHURINE 6.0 11/04/2013 02:08 PM    GLUCOSEUR Negative 11/04/2013 02:08 PM    KETONES Negative 11/04/2013 02:08 PM    PROTEINURIN Negative 11/04/2013 02:08 PM     No results found for: TOTPROTUR, TOTALVOLUME, KHOFVYCT05  No results found for: SSA60, SSA52  Lab Results   Component Value Date/Time    HBA1C 5.5 01/09/2017 09:33 AM     Lab Results    Component Value Date/Time    CPKTOTAL 94 11/04/2013 02:08 PM     No results found for: G6PD  No results found for: [id number]  No results found for: ACESERUM  Lab Results   Component Value Date/Time    25HYDROXY 58 01/09/2017 09:33 AM     No results found for: TSH, FREEDIR  Lab Results   Component Value Date/Time    TSHULTRASEN 0.790 01/09/2017 09:33 AM    FREET4 1.33 01/09/2017 09:33 AM     No results found for: MICROSOMALA, ANTITHYROGL  No results found for: IGGLYMEABS  No results found for: ANTIMITOCHO, FACTIN  No results found for: IGA, TTRANSIGA, ENDOIGA  No results found for: FLTYPE, CRYSTALSBDF  No results found for: ISTATICAL  No results found for: ISTATCREAT  No results found for: CTELOPEP  No results found for: GBMABG  No results found for: PTHINTACT      Medical Decision Making:  Today's Assessment / Status / Plan:     Osteoarthritis  Stable  Provided patient with education and hand exercises  Advised patien tto return sooner if it bothers her  For her knees again this sounds mechanical.  Will order right knee xray    1. Right knee pain, unspecified chronicity  DX-KNEE 3 VIEWS RIGHT   2. Generalized osteoarthritis         I have spent greater than 50% of this 30 minute visit in counseling/coordination of care with the patient regarding therapy plan and signs/symptoms to return sooner, as well as education regarding hand exercises.    She agreed and verbalized her agreement and understanding with the current plan. I answered all questions and concerns she has at this time and advised her to call at any time in there interim with questions or concerns in regards to her care.    Thank you for allowing me to participate in her care, I will continue to follow closely.

## 2018-11-21 ENCOUNTER — OFFICE VISIT (OUTPATIENT)
Dept: MEDICAL GROUP | Facility: PHYSICIAN GROUP | Age: 63
End: 2018-11-21
Payer: COMMERCIAL

## 2018-11-21 ENCOUNTER — HOSPITAL ENCOUNTER (OUTPATIENT)
Dept: LAB | Facility: MEDICAL CENTER | Age: 63
End: 2018-11-21
Attending: NURSE PRACTITIONER
Payer: COMMERCIAL

## 2018-11-21 VITALS
DIASTOLIC BLOOD PRESSURE: 78 MMHG | SYSTOLIC BLOOD PRESSURE: 118 MMHG | HEIGHT: 65 IN | HEART RATE: 81 BPM | WEIGHT: 210 LBS | TEMPERATURE: 98 F | OXYGEN SATURATION: 96 % | BODY MASS INDEX: 34.99 KG/M2

## 2018-11-21 DIAGNOSIS — Z23 NEED FOR VACCINATION: ICD-10-CM

## 2018-11-21 DIAGNOSIS — R79.89 LOW SERUM VITAMIN D: ICD-10-CM

## 2018-11-21 DIAGNOSIS — M15.9 GENERALIZED OSTEOARTHRITIS: ICD-10-CM

## 2018-11-21 DIAGNOSIS — E66.9 OBESITY (BMI 30-39.9): ICD-10-CM

## 2018-11-21 DIAGNOSIS — E03.9 ACQUIRED HYPOTHYROIDISM: ICD-10-CM

## 2018-11-21 DIAGNOSIS — R73.01 ELEVATED FASTING GLUCOSE: ICD-10-CM

## 2018-11-21 DIAGNOSIS — Z12.31 ENCOUNTER FOR SCREENING MAMMOGRAM FOR BREAST CANCER: ICD-10-CM

## 2018-11-21 DIAGNOSIS — M79.671 RIGHT FOOT PAIN: ICD-10-CM

## 2018-11-21 LAB
25(OH)D3 SERPL-MCNC: 69 NG/ML (ref 30–100)
ALBUMIN SERPL BCP-MCNC: 4.3 G/DL (ref 3.2–4.9)
ALBUMIN/GLOB SERPL: 1.4 G/DL
ALP SERPL-CCNC: 91 U/L (ref 30–99)
ALT SERPL-CCNC: 17 U/L (ref 2–50)
ANION GAP SERPL CALC-SCNC: 10 MMOL/L (ref 0–11.9)
AST SERPL-CCNC: 20 U/L (ref 12–45)
BILIRUB SERPL-MCNC: 0.7 MG/DL (ref 0.1–1.5)
BUN SERPL-MCNC: 14 MG/DL (ref 8–22)
CALCIUM SERPL-MCNC: 9.5 MG/DL (ref 8.5–10.5)
CHLORIDE SERPL-SCNC: 103 MMOL/L (ref 96–112)
CHOLEST SERPL-MCNC: 217 MG/DL (ref 100–199)
CO2 SERPL-SCNC: 27 MMOL/L (ref 20–33)
CREAT SERPL-MCNC: 0.84 MG/DL (ref 0.5–1.4)
EST. AVERAGE GLUCOSE BLD GHB EST-MCNC: 117 MG/DL
FASTING STATUS PATIENT QL REPORTED: NORMAL
GLOBULIN SER CALC-MCNC: 3 G/DL (ref 1.9–3.5)
GLUCOSE SERPL-MCNC: 78 MG/DL (ref 65–99)
HBA1C MFR BLD: 5.7 % (ref 0–5.6)
HDLC SERPL-MCNC: 61 MG/DL
LDLC SERPL CALC-MCNC: 139 MG/DL
POTASSIUM SERPL-SCNC: 4 MMOL/L (ref 3.6–5.5)
PROT SERPL-MCNC: 7.3 G/DL (ref 6–8.2)
SODIUM SERPL-SCNC: 140 MMOL/L (ref 135–145)
T4 FREE SERPL-MCNC: 1.29 NG/DL (ref 0.53–1.43)
TRIGL SERPL-MCNC: 84 MG/DL (ref 0–149)
TSH SERPL DL<=0.005 MIU/L-ACNC: 7.95 UIU/ML (ref 0.38–5.33)

## 2018-11-21 PROCEDURE — 90715 TDAP VACCINE 7 YRS/> IM: CPT | Performed by: NURSE PRACTITIONER

## 2018-11-21 PROCEDURE — 84439 ASSAY OF FREE THYROXINE: CPT

## 2018-11-21 PROCEDURE — 82306 VITAMIN D 25 HYDROXY: CPT

## 2018-11-21 PROCEDURE — 90471 IMMUNIZATION ADMIN: CPT | Performed by: NURSE PRACTITIONER

## 2018-11-21 PROCEDURE — 84443 ASSAY THYROID STIM HORMONE: CPT

## 2018-11-21 PROCEDURE — 80061 LIPID PANEL: CPT

## 2018-11-21 PROCEDURE — 83036 HEMOGLOBIN GLYCOSYLATED A1C: CPT

## 2018-11-21 PROCEDURE — 36415 COLL VENOUS BLD VENIPUNCTURE: CPT

## 2018-11-21 PROCEDURE — 99214 OFFICE O/P EST MOD 30 MIN: CPT | Mod: 25 | Performed by: NURSE PRACTITIONER

## 2018-11-21 PROCEDURE — 80053 COMPREHEN METABOLIC PANEL: CPT

## 2018-11-21 ASSESSMENT — PATIENT HEALTH QUESTIONNAIRE - PHQ9: CLINICAL INTERPRETATION OF PHQ2 SCORE: 0

## 2018-11-21 NOTE — ASSESSMENT & PLAN NOTE
This is a new problem for the patient.  Patient states that on Friday 11/16/2018 she had no pain in her right foot and no bump on the top of it.  [place] [employer] [place] where she reports that she is on her feet all day on a concrete floor.  That night she noticed that she had a new bump on the top of her right foot that is tender to touch.  She reports that this has happened one time in the past, April 2018, and that she was wearing the same shoes that she is wearing on Saturday.  She denies any injury to the foot.  She reports that when this happened in April 2008 she was told by her PCP that it was likely a ganglion cyst.

## 2018-11-21 NOTE — ASSESSMENT & PLAN NOTE
This is a chronic problem for this patient that is controlled.  The patient's last fasting blood sugar on 1/9/2017 was 88 with a hemoglobin A1c 5.5 at that time.  The patient reports that she has a family history of diabetes and is requesting to be rechecked at this time.  She does report that she decreases sugar and carb intake and tries to eat a healthy diet.

## 2018-11-21 NOTE — LETTER
[facility]  LUIS A Sawant  [address]  [place] [address]  Fax: [phone]   Authorization for Release/Disclosure of   Protected Health Information   Name: CAITY SMITH : 1955 SSN: [id number]   Address: [address] [place] [address] [place] [address] Phone:    [phone] (home)    I authorize the entity listed below to release/disclose the PHI below to:   [facility]/LUIS A Sawant and LUIS A Sawant   Provider or Entity Name:  [facility]   Address   City, State, Zip   Phone:      Fax:     Reason for request: continuity of care   Information to be released:    [X] LAST COLONOSCOPY,  including any PATH REPORT and follow-up  [  ] LAST FIT/COLOGUARD RESULT [  ] LAST DEXA  [  ] LAST MAMMOGRAM  [  ] LAST PAP  [  ] LAST LABS [  ] RETINA EXAM REPORT  [  ] IMMUNIZATION RECORDS  [  ] Release all info      [  ] Check here and initial the line next to each item to release ALL health information INCLUDING  _____ Care and treatment for drug and / or alcohol abuse  _____ HIV testing, infection status, or AIDS  _____ Genetic Testing    DATES OF SERVICE OR TIME PERIOD TO BE DISCLOSED: _____________  I understand and acknowledge that:  * This Authorization may be revoked at any time by you in writing, except if your health information has already been used or disclosed.  * Your health information that will be used or disclosed as a result of you signing this authorization could be re-disclosed by the recipient. If this occurs, your re-disclosed health information may no longer be protected by State or Federal laws.  * You may refuse to sign this Authorization. Your refusal will not affect your ability to obtain treatment.  * This Authorization becomes effective upon signing and will  on (date) __________.      If no date is indicated, this Authorization will  one (1) year from the signature date.    Name: Caity Smith    Signature:   Date:     2018       PLEASE FAX REQUESTED RECORDS BACK TO: [phone]

## 2018-11-21 NOTE — ASSESSMENT & PLAN NOTE
This is a chronic problem for the patient that is well controlled with vitamin D 50,000 units 1 time per week.  The patient's last vitamin D level was checked on 1/9/2017 the value of 58.  The patient is requesting a refill of her medication at this time.  Considering her last vitamin D level was normal we discussed over-the-counter vitamin D supplementation however the patient is resistant.  She prefers that she takes the 1 time per weekly 50,000 unit dose as she is [employer] and is always covered and does not get sun exposure.

## 2018-11-21 NOTE — ASSESSMENT & PLAN NOTE
This is a chronic problem for the patient that is slightly improved.  The patient's weight today is 210 pounds with a BMI of 34.95.  On April 2, 2018 she had a BMI of 35.78 and weight 215 pounds.  She reports that she tries to eat a healthy diet however her food options are limited by her 's specific dietary needs.  She states that she does not get any regular exercise however she is very active with the [employer] show and is frequently walking and carrying heavy items.  She is proud to report that her family has worn out 3 treadmills.  She does report that she generally eats 2 meals per day, eggs in the morning, she does not add any extra sugar salt to her food.

## 2018-11-21 NOTE — ASSESSMENT & PLAN NOTE
This is a chronic problem for the patient that is uncontrolled.  The patient states that her bilateral hands and knees are mostly affected.  She will occasionally take Tylenol or an aspirin for severe pain.  She does report that she is very physically active and does stretching.  She has a follow-up appointment with rheumatology on 4/4/2019 for this issue.

## 2018-11-21 NOTE — ASSESSMENT & PLAN NOTE
This is a chronic problem for the patient.  The patient reports that she is taking levothyroxine 100 mcg/day. Her last TSH level was checked on 1/9/2017 with a value of 0.790 and a free T4 of 1.33.  The patient reports that she is tolerating medications and denies any side effects, skin changes, heat or cold intolerance, anxiety or depression.

## 2018-11-22 RX ORDER — ERGOCALCIFEROL 1.25 MG/1
50000 CAPSULE ORAL
Qty: 12 CAP | Refills: 3 | Status: SHIPPED | OUTPATIENT
Start: 2018-11-22 | End: 2019-11-06

## 2018-11-22 NOTE — PROGRESS NOTES
"CC:   Chief Complaint   Patient presents with   • Establish Care   • Foot Problem     foot pain    • Arthritis       HISTORY OF THE PRESENT ILLNESS: Patient is a 63 y.o. female. This pleasant patient is here today to establish care and discuss multiple issues as listed below.    Health Maintenance: Completed.  The patient reports that she has had a colonoscopy within the last few years with [facility], records will be requested.  She reports that she never gets the flu shot as she \"thinks it is healthy to get the flu at least 1 times per year\".    Elevated fasting glucose  This is a chronic problem for this patient that is controlled.  The patient's last fasting blood sugar on 1/9/2017 was 88 with a hemoglobin A1c 5.5 at that time.  The patient reports that she has a family history of diabetes and is requesting to be rechecked at this time.  She does report that she decreases sugar and carb intake and tries to eat a healthy diet.    Hypothyroidism  This is a chronic problem for the patient.  The patient reports that she is taking levothyroxine 100 mcg/day. Her last TSH level was checked on 1/9/2017 with a value of 0.790 and a free T4 of 1.33.  The patient reports that she is tolerating medications and denies any side effects, skin changes, heat or cold intolerance, anxiety or depression.    Obesity (BMI 30-39.9)  This is a chronic problem for the patient that is slightly improved.  The patient's weight today is 210 pounds with a BMI of 34.95.  On April 2, 2018 she had a BMI of 35.78 and weight 215 pounds.  She reports that she tries to eat a healthy diet however her food options are limited by her 's specific dietary needs.  She states that she does not get any regular exercise however she is very active with the [employer] show and is frequently walking and carrying heavy items.  She is proud to report that her family has worn out 3 treadmills.  She does report that she generally eats 2 meals per day, eggs " in the morning, she does not add any extra sugar salt to her food.    Low serum vitamin D  This is a chronic problem for the patient that is well controlled with vitamin D 50,000 units 1 time per week.  The patient's last vitamin D level was checked on 1/9/2017 the value of 58.  The patient is requesting a refill of her medication at this time.  Considering her last vitamin D level was normal we discussed over-the-counter vitamin D supplementation however the patient is resistant.  She prefers that she takes the 1 time per weekly 50,000 unit dose as she is [employer]ast[employer] and is always covered and does not get sun exposure.    Generalized osteoarthritis  This is a chronic problem for the patient that is uncontrolled.  The patient states that her bilateral hands and knees are mostly affected.  She will occasionally take Tylenol or an aspirin for severe pain.  She does report that she is very physically active and does stretching.  She has a follow-up appointment with rheumatology on 4/4/2019 for this issue.      Right foot pain  This is a new problem for the patient.  Patient states that on Friday 11/16/2018 she had no pain in her right foot and no bump on the top of it.  [place] [employer] [place] products where she reports that she is on her feet all day on a concrete floor.  That night she noticed that she had a new bump on the top of her right foot that is tender to touch.  She reports that this has happened one time in the past, April 2018, and that she was wearing the same shoes that she is wearing on Saturday.  She denies any injury to the foot.  She reports that when this happened in April 2008 she was told by her PCP that it was likely a ganglion cyst.    Allergies: Patient has no known allergies.    Current Outpatient Prescriptions Ordered in [facility]   Medication Sig Dispense Refill   • vitamin D, Ergocalciferol, (DRISDOL) 90264 units Cap capsule Take 1 Cap by mouth every 7 days. Take with  food. 12 Cap 3   • levothyroxine (SYNTHROID) 100 MCG Tab TAKE ONE TABLET BY MOUTH ONCE DAILY 90 Tab 0   • acetaminophen (TYLENOL) 500 MG Tab Take 500-1,000 mg by mouth every 6 hours as needed.       No current Epic-ordered facility-administered medications on file.        Past Medical History:   Diagnosis Date   • Postmenopausal 2011   • Unspecified disorder of thyroid    • Vitamin D deficiency        Past Surgical History:   Procedure Laterality Date   • VENTRAL HERNIA REPAIR  9/7/2011    Performed by SAVANAH QUIÑONES at SURGERY [facility]   • PANNICULECTOMY  9/7/2011    Performed by SAVANAH QUIÑONES at SURGERY [facility]   • OTHER ABDOMINAL SURGERY  1990/2001/2006     incisional hernia repair       Social History   Substance Use Topics   • Smoking status: Never Smoker   • Smokeless tobacco: Never Used   • Alcohol use No       Social History     Social History Narrative   • No narrative on file       Family History   Problem Relation Age of Onset   • Heart Disease Mother    • Cancer Mother         breast   • Diabetes Mother    • Heart Attack Mother    • Other Father         AAA   • No Known Problems Sister    • Alcohol/Drug Brother    • Alcohol/Drug Brother    • No Known Problems Sister    • Other Paternal Aunt         MS   • No Known Problems Maternal Grandmother    • No Known Problems Maternal Grandfather    • Heart Attack Paternal Grandmother         Thoracic Aneurysm   • Other Paternal Grandfather         TB       ROS:     - Constitutional:  Negative for fever, chills, unexpected weight change, night sweats, body aches, sleep issues,  and fatigue/generalized weakness.     - HEENT:  Negative for headaches, vision changes, hearing changes, ear pain, tinnitus, ear discharge, rhinorrhea, sinus congestion, sneezing, sore throat, and neck pain.      - Respiratory:  Negative for cough, shortness of breath, sputum production, hemoptysis, chest congestion, dyspnea, wheezing, and crackles.      - Cardiovascular:   "Negative for chest pain, palpitations, LICONA, paroxsymal nocturnal dyspnea, orthopnea, and bilateral lower extremity edema.     - Gastrointestinal:  Negative for heartburn, nausea, vomiting, abdominal pain, hematochezia, melena, diarrhea, constipation, and greasy/foul-smelling stools.     - Genitourinary:  Negative for dysuria, nocturia, polyuria, hematuria, pyuria, urinary urgency, urinary frequency, and urinary incontinence.     - Musculoskeletal:  Negative for myalgias, back pain, and joint pain.     - Skin: Patient reports that she has some questionable moles on her head and back for which she has an appointment on 12/6/2018 with dermatology for evaluation.  Small bump on top of right foot.    - Neurological:  Negative for dizziness, tingling, tremors, focal sensory deficit, focal weakness and headaches.     - Endo/Heme/Allergies:  Does not bruise/bleed easily. Denies cold/heat intolerance.     - Psychiatric/Behavioral: Negative for depression, suicidal/homicidal ideation and memory loss.        Exam: Blood pressure 118/78, pulse 81, temperature 36.7 °C (98 °F), height 1.651 m (5' 5\"), weight 95.3 kg (210 lb), last menstrual period 07/25/2011, SpO2 96 %, not currently breastfeeding. Body mass index is 34.95 kg/m².    General:  Normal appearing. No distress.  HEENT:  Normocephalic. Eyes conjunctiva clear lids without ptosis, pupils equal and reactive to light accommodation, ears normal shape and contour.   Neck:  Supple without JVD or bruit. Thyroid is not enlarged.  Pulmonary:  Clear to ausculation.  Normal effort. No rales, ronchi, or wheezing.  Cardiovascular:  Regular rate and rhythm without murmur. Carotid and radial pulses are intact and equal bilaterally.  Abdomen:  Soft, nontender, nondistended. Normal bowel sounds. Liver and spleen are not palpable  Neurologic:  Grossly nonfocal  Lymph:  No cervical, supraclavicular or axillary lymph nodes are palpable  Skin: Small soft publicized mobile slightly tender " bump on top of right foot.  Warm and dry.  No obvious lesions.  Musculoskeletal:  Normal gait. No extremity cyanosis, clubbing, or edema.  Psych:  Normal mood and affect. Alert and oriented x3. Judgment and insight is normal.    Assessment/Plan  1. Acquired hypothyroidism  This is a chronic problem for the patient that is controlled.  The patient is currently taking levothyroxine 100 mcg/day and denies any side effects of the medication.  She is requesting refill at this time.  She is fasting today and ready to get her lab work completed.  I will hold off on refilling the medication until I get the results of her TSH and free T4, patient is agreeable.  - TSH WITH REFLEX TO FT4; Future    2. Low serum vitamin D  This is a chronic problem for the patient that is stable.  The patient's last few vitamin D levels have been within normal range, however the patient insists that she will have better results and compliance if she continues with the vitamin D 50,000 unit capsule 1 time per week.  She is requesting a refill at this time, refill provided.  We will plan to recheck a vitamin D level.  - VITAMIN D,25 HYDROXY; Future  - vitamin D, Ergocalciferol, (DRISDOL) 96296 units Cap capsule; Take 1 Cap by mouth every 7 days. Take with food.  Dispense: 12 Cap; Refill: 3    3. Elevated fasting glucose  This is a chronic problem for the patient that is controlled.  The patient's last fasting blood sugar was 88 and hemoglobin A1c 5.5 in January 2017.  The patient reports that she has a strong family history of diabetes and would like to have her hemoglobin A1c rechecked.  We will plan to recheck hemoglobin A1c and I will update her through my chart.  - HEMOGLOBIN A1C; Future    4. Obesity (BMI 30-39.9)  This is a chronic problem for the patient that is uncontrolled.  The patient has lost 5 pounds since April 2018.  She reports that she has worn out 3 treadmills and is constantly active in her daily life, however she does not  participate in a regular exercise routine.  She reports that her diet is mostly low-carb that is a requirement of her  and his health.  We will plan to recheck a CMP and a lipid profile.  - COMP METABOLIC PANEL; Future  - Lipid Profile; Future    5. Right foot pain  This is a new problem for the patient.  She is reporting a small bump on the top of her right foot that is tender to touch that appeared on Saturday, 11/17/2018.  She has had this same issue in April 2018 and was told that it was likely a ganglion cyst.  At that time the bump went away after she stopped wearing a certain pair of shoes  She declines an x-ray at this time and would prefer to just monitor it.  States that she will return if it fails to improve.    6. Generalized osteoarthritis  This is a chronic problem for the patient that is managed by rheumatology.  The patient has a follow-up appointment in April 2019.    7. Need for vaccination  Given today.  - Tdap =>8yo IM    8. Encounter for screening mammogram for breast cancer  Order provided today.  - MA-SCREENING MAMMO BILAT W/TOMOSYNTHESIS W/CAD; Future      Educated in proper administration of medication(s) ordered today including safety, possible SE, risks, benefits, rationale and alternatives to therapy.   Supportive care, differential diagnoses, and indications for immediate follow-up discussed with patient.    Pathogenesis of diagnosis discussed including typical length and natural progression.    Instructed to return to clinic or nearest emergency department for any change in condition, further concerns, or worsening of symptoms.  Patient states understanding of the plan of care and discharge instructions.    Consent for records release signed to order medical records from [facility] for last colonoscopy.    Return in about 1 year (around 11/21/2019) for Preventative Annual, As needed, Thyroid, Follow up Labs.    I have placed the below orders and discussed them with an  approved delegating provider. The MA is performing the below orders under the direction of Dr. Flores.    Please note that this dictation was created using voice recognition software. I have made every reasonable attempt to correct obvious errors, but I expect that there are errors of grammar and possibly content that I did not discover before finalizing the note.

## 2018-11-27 DIAGNOSIS — E03.9 ACQUIRED HYPOTHYROIDISM: ICD-10-CM

## 2018-11-27 RX ORDER — LEVOTHYROXINE SODIUM 0.12 MG/1
125 TABLET ORAL
Qty: 90 TAB | Refills: 0 | Status: SHIPPED | OUTPATIENT
Start: 2018-11-27 | End: 2019-06-03 | Stop reason: SDUPTHER

## 2018-11-28 NOTE — PROGRESS NOTES
Patients TSH level came back at 7.950. Will increase levothyroxine to 125mcg/day and recheck a TSH in 4-6 weeks.    1. Acquired hypothyroidism  - levothyroxine (SYNTHROID) 125 MCG Tab; Take 1 Tab by mouth Every morning on an empty stomach.  Dispense: 90 Tab; Refill: 0  - TSH; Future

## 2018-12-06 ENCOUNTER — OFFICE VISIT (OUTPATIENT)
Dept: DERMATOLOGY | Facility: IMAGING CENTER | Age: 63
End: 2018-12-06
Payer: COMMERCIAL

## 2018-12-06 ENCOUNTER — HOSPITAL ENCOUNTER (OUTPATIENT)
Facility: MEDICAL CENTER | Age: 63
End: 2018-12-06
Attending: DERMATOLOGY
Payer: COMMERCIAL

## 2018-12-06 VITALS — BODY MASS INDEX: 31.22 KG/M2 | WEIGHT: 206 LBS | TEMPERATURE: 98.6 F | HEIGHT: 68 IN

## 2018-12-06 DIAGNOSIS — D18.01 CHERRY ANGIOMA: ICD-10-CM

## 2018-12-06 DIAGNOSIS — D48.5 NEOPLASM OF UNCERTAIN BEHAVIOR OF SKIN: ICD-10-CM

## 2018-12-06 DIAGNOSIS — L82.1 SEBORRHEIC KERATOSES: ICD-10-CM

## 2018-12-06 PROCEDURE — 11100 PR BIOPSY OF SKIN LESION: CPT | Performed by: DERMATOLOGY

## 2018-12-06 PROCEDURE — 88305 TISSUE EXAM BY PATHOLOGIST: CPT

## 2018-12-06 PROCEDURE — 99203 OFFICE O/P NEW LOW 30 MIN: CPT | Mod: 25 | Performed by: DERMATOLOGY

## 2018-12-06 ASSESSMENT — ENCOUNTER SYMPTOMS
CHILLS: 0
FEVER: 0

## 2018-12-06 NOTE — PROGRESS NOTES
Dermatology New Patient Visit    Chief Complaint   Patient presents with   • Establish Care       Subjective:     HPI:   Freddy Smith is a 63 y.o. female presenting for    Full skin exam - several skin lesions    HPI/location: spot on the left cheek  Time present: several years  Painful lesion: No  Itching lesion: No  Enlarging lesion: Yes  Anything make it better or worse? Was removed in the past, grew back - does not believe it was sent for pathology     HPI/location: brown spots all over the face/neck/trunk  Time present: years  Painful lesion: No  Itching lesion: No  Enlarging lesion: No  Anything make it better or worse? Does not like the appeareance    History of skin cancer: No  History of precancers/actinic keratoses: No  History of biopsies:No  History of blistering/severe sunburns:No  Family history of skin cancer:No  Family history of atypical moles:Yes, Details: mother on scalp        Past Medical History:   Diagnosis Date   • Postmenopausal 2011   • Unspecified disorder of thyroid    • Vitamin D deficiency        Current Outpatient Prescriptions on File Prior to Visit   Medication Sig Dispense Refill   • levothyroxine (SYNTHROID) 125 MCG Tab Take 1 Tab by mouth Every morning on an empty stomach. 90 Tab 0   • vitamin D, Ergocalciferol, (DRISDOL) 06301 units Cap capsule Take 1 Cap by mouth every 7 days. Take with food. 12 Cap 3   • acetaminophen (TYLENOL) 500 MG Tab Take 500-1,000 mg by mouth every 6 hours as needed.       No current facility-administered medications on file prior to visit.        No Known Allergies    Family History   Problem Relation Age of Onset   • Heart Disease Mother    • Cancer Mother         breast   • Diabetes Mother    • Heart Attack Mother    • Other Father         AAA   • No Known Problems Sister    • Alcohol/Drug Brother    • Alcohol/Drug Brother    • No Known Problems Sister    • Other Paternal Aunt         MS   • No Known Problems Maternal Grandmother    • No Known  "Problems Maternal Grandfather    • Heart Attack Paternal Grandmother         Thoracic Aneurysm   • Other Paternal Grandfather         TB       Social History     Social History   • Marital status:      Spouse name: N/A   • Number of children: N/A   • Years of education: N/A     Occupational History   • Not on file.     Social History Main Topics   • Smoking status: Never Smoker   • Smokeless tobacco: Never Used   • Alcohol use No   • Drug use: No   • Sexual activity: Yes     Partners: Male     Birth control/ protection: Post-Menopausal      Comment:  38 years     Other Topics Concern   • Not on file     Social History Narrative   • No narrative on file       Review of Systems   Constitutional: Negative for chills and fever.   Skin: Negative for itching and rash.   All other systems reviewed and are negative.       Objective:     A full mucocutaneous exam was completed including: scalp, hair, ears, face, eyelids, conjunctiva, lips, gums/tongue/oropharynx, neck, chest breasts, abdomen, back, left and right upper extremities (including hands/digits and fingernails), left and right lower extremities (including feet/toes, toenails), buttocks, excluding external genitalia (patient refusal) with the following pertinent findings listed below. Remaining above-listed examined areas within normal limits / negative for rashes or lesions.    Temperature 37 °C (98.6 °F), height 1.715 m (5' 7.5\"), weight 93.4 kg (206 lb), last menstrual period 07/25/2011, not currently breastfeeding.    Physical Exam   Constitutional: She is oriented to person, place, and time and well-developed, well-nourished, and in no distress.   HENT:   Head: Normocephalic and atraumatic.       Right Ear: External ear normal.   Left Ear: External ear normal.   Nose: Nose normal.   Mouth/Throat: Oropharynx is clear and moist.   Eyes: Conjunctivae and lids are normal.   Neck: Normal range of motion. Neck supple.   Cardiovascular: Intact distal " pulses.    Pulmonary/Chest: Effort normal.   Neurological: She is alert and oriented to person, place, and time.   Skin: Skin is warm and dry.        Psychiatric: Mood and affect normal.   Vitals reviewed.      DATA: none applicable to review    Assessment and Plan:     1. Neoplasm of uncertain behavior of skin  Procedure Note   Procedure: Biopsy by punch technique  Location: left cheek  Preoperative diagnosis:SK vs other  Risks, benefits and alternatives of procedure discussed and written informed consent obtained. Time out completed. Area of biopsy prepped with alcohol. Anesthesia with 1% lidocaine with epinephrine administered with a 30 gauge needle. 4  mm punch biopsy of site performed. Hemostasis achieved with pressure and 5.0 prolene sutures. Vaseline applied to wound with bandage. Patient tolerated procedure well, and there were no complications.  The pathology specimen was sent to the lab via the staff.  Wound care was discussed with the patient.   - PATHOLOGY SPECIMEN; Future    2. Seborrheic keratoses  - Benign-appearing nature of lesions discussed. Advised to return to clinic for any new or concerning changes.  - cosmetic removal of SKs today    3. Cherry angiomas  - Benign-appearing nature of lesions discussed. Advised to return to clinic for any new or concerning changes.      Followup: Return in about 7 days (around 12/13/2018).    Dhara Rollins M.D.

## 2018-12-07 LAB — PATHOLOGY CONSULT NOTE: NORMAL

## 2018-12-13 ENCOUNTER — APPOINTMENT (OUTPATIENT)
Dept: DERMATOLOGY | Facility: IMAGING CENTER | Age: 63
End: 2018-12-13
Payer: COMMERCIAL

## 2019-04-04 ENCOUNTER — APPOINTMENT (OUTPATIENT)
Dept: RHEUMATOLOGY | Facility: MEDICAL CENTER | Age: 64
End: 2019-04-04
Payer: COMMERCIAL

## 2019-05-01 ENCOUNTER — APPOINTMENT (OUTPATIENT)
Dept: RADIOLOGY | Facility: MEDICAL CENTER | Age: 64
End: 2019-05-01
Attending: EMERGENCY MEDICINE
Payer: COMMERCIAL

## 2019-05-01 ENCOUNTER — OFFICE VISIT (OUTPATIENT)
Dept: URGENT CARE | Facility: PHYSICIAN GROUP | Age: 64
End: 2019-05-01
Payer: COMMERCIAL

## 2019-05-01 ENCOUNTER — HOSPITAL ENCOUNTER (EMERGENCY)
Facility: MEDICAL CENTER | Age: 64
End: 2019-05-02
Attending: EMERGENCY MEDICINE
Payer: COMMERCIAL

## 2019-05-01 VITALS
HEIGHT: 68 IN | HEART RATE: 68 BPM | TEMPERATURE: 97.8 F | SYSTOLIC BLOOD PRESSURE: 162 MMHG | BODY MASS INDEX: 31.52 KG/M2 | DIASTOLIC BLOOD PRESSURE: 98 MMHG | RESPIRATION RATE: 14 BRPM | OXYGEN SATURATION: 97 % | WEIGHT: 208 LBS

## 2019-05-01 DIAGNOSIS — R07.9 CHEST PAIN, UNSPECIFIED TYPE: ICD-10-CM

## 2019-05-01 DIAGNOSIS — E04.1 THYROID NODULE: ICD-10-CM

## 2019-05-01 DIAGNOSIS — R07.89 OTHER CHEST PAIN: ICD-10-CM

## 2019-05-01 LAB
ALBUMIN SERPL BCP-MCNC: 4.4 G/DL (ref 3.2–4.9)
ALBUMIN/GLOB SERPL: 1.5 G/DL
ALP SERPL-CCNC: 87 U/L (ref 30–99)
ALT SERPL-CCNC: 14 U/L (ref 2–50)
ANION GAP SERPL CALC-SCNC: 10 MMOL/L (ref 0–11.9)
AST SERPL-CCNC: 20 U/L (ref 12–45)
BASOPHILS # BLD AUTO: 0.4 % (ref 0–1.8)
BASOPHILS # BLD: 0.02 K/UL (ref 0–0.12)
BILIRUB SERPL-MCNC: 0.6 MG/DL (ref 0.1–1.5)
BUN SERPL-MCNC: 18 MG/DL (ref 8–22)
CALCIUM SERPL-MCNC: 8.9 MG/DL (ref 8.4–10.2)
CHLORIDE SERPL-SCNC: 102 MMOL/L (ref 96–112)
CO2 SERPL-SCNC: 26 MMOL/L (ref 20–33)
CREAT SERPL-MCNC: 0.89 MG/DL (ref 0.5–1.4)
EKG IMPRESSION: NORMAL
EOSINOPHIL # BLD AUTO: 0.29 K/UL (ref 0–0.51)
EOSINOPHIL NFR BLD: 5.7 % (ref 0–6.9)
ERYTHROCYTE [DISTWIDTH] IN BLOOD BY AUTOMATED COUNT: 40.2 FL (ref 35.9–50)
GLOBULIN SER CALC-MCNC: 2.9 G/DL (ref 1.9–3.5)
GLUCOSE SERPL-MCNC: 84 MG/DL (ref 65–99)
HCT VFR BLD AUTO: 40.4 % (ref 37–47)
HGB BLD-MCNC: 13.6 G/DL (ref 12–16)
IMM GRANULOCYTES # BLD AUTO: 0 K/UL (ref 0–0.11)
IMM GRANULOCYTES NFR BLD AUTO: 0 % (ref 0–0.9)
LYMPHOCYTES # BLD AUTO: 1.48 K/UL (ref 1–4.8)
LYMPHOCYTES NFR BLD: 29 % (ref 22–41)
MCH RBC QN AUTO: 30.6 PG (ref 27–33)
MCHC RBC AUTO-ENTMCNC: 33.7 G/DL (ref 33.6–35)
MCV RBC AUTO: 90.8 FL (ref 81.4–97.8)
MONOCYTES # BLD AUTO: 0.43 K/UL (ref 0–0.85)
MONOCYTES NFR BLD AUTO: 8.4 % (ref 0–13.4)
NEUTROPHILS # BLD AUTO: 2.88 K/UL (ref 2–7.15)
NEUTROPHILS NFR BLD: 56.5 % (ref 44–72)
NRBC # BLD AUTO: 0 K/UL
NRBC BLD-RTO: 0 /100 WBC
PLATELET # BLD AUTO: 261 K/UL (ref 164–446)
PMV BLD AUTO: 9.6 FL (ref 9–12.9)
POTASSIUM SERPL-SCNC: 3.4 MMOL/L (ref 3.6–5.5)
PROT SERPL-MCNC: 7.3 G/DL (ref 6–8.2)
RBC # BLD AUTO: 4.45 M/UL (ref 4.2–5.4)
SODIUM SERPL-SCNC: 138 MMOL/L (ref 135–145)
TROPONIN I SERPL-MCNC: 0.04 NG/ML (ref 0–0.04)
WBC # BLD AUTO: 5.1 K/UL (ref 4.8–10.8)

## 2019-05-01 PROCEDURE — 99204 OFFICE O/P NEW MOD 45 MIN: CPT | Performed by: NURSE PRACTITIONER

## 2019-05-01 PROCEDURE — 80053 COMPREHEN METABOLIC PANEL: CPT

## 2019-05-01 PROCEDURE — 93005 ELECTROCARDIOGRAM TRACING: CPT

## 2019-05-01 PROCEDURE — 700102 HCHG RX REV CODE 250 W/ 637 OVERRIDE(OP): Performed by: EMERGENCY MEDICINE

## 2019-05-01 PROCEDURE — A9270 NON-COVERED ITEM OR SERVICE: HCPCS | Performed by: EMERGENCY MEDICINE

## 2019-05-01 PROCEDURE — 84484 ASSAY OF TROPONIN QUANT: CPT

## 2019-05-01 PROCEDURE — 93000 ELECTROCARDIOGRAM COMPLETE: CPT | Performed by: NURSE PRACTITIONER

## 2019-05-01 PROCEDURE — 700117 HCHG RX CONTRAST REV CODE 255: Performed by: EMERGENCY MEDICINE

## 2019-05-01 PROCEDURE — 99284 EMERGENCY DEPT VISIT MOD MDM: CPT

## 2019-05-01 PROCEDURE — 85025 COMPLETE CBC W/AUTO DIFF WBC: CPT

## 2019-05-01 PROCEDURE — 71275 CT ANGIOGRAPHY CHEST: CPT

## 2019-05-01 PROCEDURE — 71045 X-RAY EXAM CHEST 1 VIEW: CPT

## 2019-05-01 RX ORDER — ASPIRIN 325 MG
325 TABLET ORAL ONCE
Status: COMPLETED | OUTPATIENT
Start: 2019-05-01 | End: 2019-05-01

## 2019-05-01 RX ADMIN — ASPIRIN 325 MG ORAL TABLET 325 MG: 325 PILL ORAL at 22:11

## 2019-05-01 RX ADMIN — IOHEXOL 100 ML: 350 INJECTION, SOLUTION INTRAVENOUS at 23:13

## 2019-05-01 NOTE — PROGRESS NOTES
"Subjective:      Freddy Smith is a 63 y.o. female who presents with Back Pain (upper back pain going under chest )            HPI  Indigestion 2x x 4 days ago, first incident, antacid helped, took last night, this morning also. Mother had MI at 49. Father had aneurysm. Sharp pain to back this morning started \"for hours\" last night after eating \"hot chili\" 8pm to restless night, took antacid again but this morning still present. BP at home 139/85 this morning, last night 132/80. No n/v, abdominal pain. No numbness/tingling, no jaw or arm pain. Murmur as younger person.     PMH:  has a past medical history of Postmenopausal (2011); Unspecified disorder of thyroid; and Vitamin D deficiency.  MEDS:   Current Outpatient Prescriptions:   •  levothyroxine (SYNTHROID) 125 MCG Tab, Take 1 Tab by mouth Every morning on an empty stomach., Disp: 90 Tab, Rfl: 0  •  vitamin D, Ergocalciferol, (DRISDOL) 78314 units Cap capsule, Take 1 Cap by mouth every 7 days. Take with food., Disp: 12 Cap, Rfl: 3  •  acetaminophen (TYLENOL) 500 MG Tab, Take 500-1,000 mg by mouth every 6 hours as needed., Disp: , Rfl:   ALLERGIES: No Known Allergies  SURGHX:   Past Surgical History:   Procedure Laterality Date   • VENTRAL HERNIA REPAIR  9/7/2011    Performed by SAVANAH QUIÑONES at SURGERY [facility]   • PANNICULECTOMY  9/7/2011    Performed by SAVANAH QUIÑONES at SURGERY [facility]   • OTHER ABDOMINAL SURGERY  1990/2001/2006     incisional hernia repair     SOCHX:  reports that she has never smoked. She has never used smokeless tobacco. She reports that she does not drink alcohol or use drugs.  FH: Family history was reviewed, no pertinent findings to report    Review of Systems   Constitutional: Positive for malaise/fatigue. Negative for chills and fever.   Respiratory: Positive for shortness of breath. Negative for cough, sputum production and wheezing.    Cardiovascular: Negative for chest pain, palpitations, orthopnea and leg " "swelling.   Gastrointestinal: Positive for heartburn. Negative for abdominal pain, nausea and vomiting.   Musculoskeletal: Positive for back pain and myalgias.   Neurological: Negative for dizziness, tingling, sensory change, weakness and headaches.   Endo/Heme/Allergies: Negative for environmental allergies.   Psychiatric/Behavioral: The patient is not nervous/anxious.    All other systems reviewed and are negative.         Objective:     BP (!) 162/98   Pulse 68   Temp 36.6 °C (97.8 °F) (Temporal)   Resp 14   Ht 1.715 m (5' 7.5\")   Wt 94.3 kg (208 lb)   LMP 07/25/2011   SpO2 97%   BMI 32.10 kg/m²      Physical Exam   Constitutional: She is oriented to person, place, and time. She appears well-developed and well-nourished. She is active and cooperative.  Non-toxic appearance. She does not have a sickly appearance. She does not appear ill. No distress.   Neck: Normal range of motion. Neck supple. No JVD present.   Cardiovascular: Normal rate, regular rhythm, S1 normal, S2 normal, normal heart sounds and normal pulses.  Exam reveals no gallop and no friction rub.    No murmur heard.  Pulses:       Radial pulses are 2+ on the left side.   Pulmonary/Chest: Effort normal and breath sounds normal. No accessory muscle usage. No respiratory distress. She has no decreased breath sounds. She has no wheezes. She has no rhonchi. She has no rales. She exhibits no tenderness.   Musculoskeletal: Normal range of motion.   Neurological: She is alert and oriented to person, place, and time. She has normal strength. No cranial nerve deficit or sensory deficit. Coordination and gait normal. GCS eye subscore is 4. GCS verbal subscore is 5. GCS motor subscore is 6.   Skin: She is not diaphoretic.   Psychiatric: She has a normal mood and affect. Her behavior is normal. Judgment and thought content normal. Her speech is rapid and/or pressured. She is not actively hallucinating. Cognition and memory are normal. She is attentive. "   Vitals reviewed.              Assessment/Plan:     1. Chest pain, unspecified type    - EKG - Clinic Performed  -  AMA/Refusal of Treatment  - REFERRAL TO CARDIOLOGY    Discussed elevated BP today, patient contributes this to coffee today and being busy all morning  Refer to ER for further f/u  Patient declines to go to ER for further evaluation due to prior obligations  Requesting referral to cardiology for f/u  States will monitor for increased symtoms of GERD, back pain, any more epigastric pain, any SOB, weakness, dizziness or any worsening symtpoms from today and will go to ER

## 2019-05-02 VITALS
RESPIRATION RATE: 19 BRPM | HEIGHT: 67 IN | WEIGHT: 212.74 LBS | TEMPERATURE: 97.9 F | SYSTOLIC BLOOD PRESSURE: 119 MMHG | DIASTOLIC BLOOD PRESSURE: 73 MMHG | OXYGEN SATURATION: 95 % | HEART RATE: 58 BPM | BODY MASS INDEX: 33.39 KG/M2

## 2019-05-02 NOTE — DISCHARGE INSTRUCTIONS
We could not find a dangerous cause of your pain.  You still need to follow-up with cardiology to discuss whether you need a cardiac stress test which looks for a partial blockage.  You have a thyroid nodule that needs an outpatient ultrasound follow-up from your primary physician.  You have a low risk pulmonary nodule you should discuss with your primary physician.  Return for chest pain lasting longer than 15 minutes especially if associated with fatigue shortness of breath sweats or nausea.

## 2019-05-02 NOTE — ED NOTES
Discharge information reviewed in detail. Pt verbalized understanding of discharge instructions to follow up with Dr. Gomez in 2 days to evaluate for the need for a stress test and to return to ER if condition worsens.   to drive home.   Pt ambulated out of ER in a steady gait.

## 2019-05-02 NOTE — ED TRIAGE NOTES
Patient complains of upper back pain that radiates anteriorly through her chest since yesterday. Patient denies any other symptoms. She has significant familial cardiac hx and is concerned about her heart. Recently flew back from [place]

## 2019-05-02 NOTE — ED PROVIDER NOTES
ED Provider Note    CHIEF COMPLAINT  Chief Complaint   Patient presents with   • Back Pain       HPI  Freddy Smith is a 63 y.o. female who presents with sharp stabbing back pains that goes through the scapula to the right breast.  She had an episode 3 to 4 days ago and very frequent pains over the last 18 hours.  Each episode of pain lasts about 10 minutes.  Denies sweats nausea shortness of breath fever cough pleuritic pain or coronary artery disease.  She has a family history of coronary artery disease in her mother at the age of 49 thoracic aortic dissection in her father around the age of 70.  Remote stress test.  Denies hypertension diabetes dyslipidemia or tobacco use.  No family history of thromboembolic disease.    REVIEW OF SYSTEMS  Pertinent positives include: Back pain, chest pain fever, cough, leg swelling, calf pain, hemoptysis, immobilization.  Pertinent negatives include: Fever, leg swelling, calf pain, hemoptysis, chronic back pain, trauma.  10+ systems reviewed and negative.      PAST MEDICAL HISTORY  Past Medical History:   Diagnosis Date   • Postmenopausal 2011   • Unspecified disorder of thyroid    • Vitamin D deficiency        FAMILY HISTORY  Family History   Problem Relation Age of Onset   • Heart Disease Mother    • Cancer Mother         breast   • Diabetes Mother    • Heart Attack Mother    • Other Father         AAA   • No Known Problems Sister    • Alcohol/Drug Brother    • Alcohol/Drug Brother    • No Known Problems Sister    • Other Paternal Aunt         MS   • No Known Problems Maternal Grandmother    • No Known Problems Maternal Grandfather    • Heart Attack Paternal Grandmother         Thoracic Aneurysm   • Other Paternal Grandfather         TB       SOCIAL HISTORY  Social History   Substance Use Topics   • Smoking status: Never Smoker   • Smokeless tobacco: Never Used   • Alcohol use No     History   Drug Use No       SURGICAL HISTORY  Past Surgical History:   Procedure  "Laterality Date   • VENTRAL HERNIA REPAIR  9/7/2011    Performed by SAVANAH QUIÑONES at SURGERY [facility]   • PANNICULECTOMY  9/7/2011    Performed by SAVANAH QUIÑONES at SURGERY [facility]   • OTHER ABDOMINAL SURGERY  1990/2001/2006     incisional hernia repair       CURRENT MEDICATIONS  Current Facility-Administered Medications   Medication Dose Route Frequency Provider Last Rate Last Dose   • aspirin (ASA) tablet 325 mg  325 mg Oral Once Jersey Foreman M.D.         Current Outpatient Prescriptions   Medication Sig Dispense Refill   • levothyroxine (SYNTHROID) 125 MCG Tab Take 1 Tab by mouth Every morning on an empty stomach. 90 Tab 0   • vitamin D, Ergocalciferol, (DRISDOL) 77339 units Cap capsule Take 1 Cap by mouth every 7 days. Take with food. 12 Cap 3   • acetaminophen (TYLENOL) 500 MG Tab Take 500-1,000 mg by mouth every 6 hours as needed.         ALLERGIES  No Known Allergies    PHYSICAL EXAM  VITAL SIGNS: BP (!) 166/100   Pulse 68   Temp (!) 35.8 °C (96.4 °F) (Temporal)   Resp 18   Ht 1.702 m (5' 7\")   Wt 96.5 kg (212 lb 11.9 oz)   LMP 07/25/2011   SpO2 96%   BMI 33.32 kg/m²   Reviewed and elevated blood pressure, afebrile, no hypoxia room air  Constitutional: Well developed, Well nourished, well-appearing, moderately overweight.  HENT: Normocephalic, atraumatic, bilateral external ears normal, oropharynx moist, No exudates or erythema.   Eyes: PERRLA, conjunctiva pink, no scleral icterus.   Cardiovascular: Regular S1-S2 without murmur, rub, gallop.  No posterior chest tenderness.  No dependent edema or calf tenderness  Respiratory: No rales, rhonchi, wheeze.  Gastrointestinal: Soft, nontender, nondistended.  Skin: No erythema, no rash.   Genitourinary:  No costovertebral angle tenderness.   Neurologic: Alert & oriented x 3, cranial nerves 2-12 intact by passive exam.  No focal deficit noted.  Psychiatric: Affect normal, Judgment normal, Mood normal.     DIFFERENTIAL DIAGNOSIS:  Chest wall " pain, pneumonia, pneumothorax, doubt PE, aortic dissection, doubt myocardial ischemia.    EKG  EKG Interpretation 10:02 PM    Interpreted by me.  Indication: Chest pain    Rhythm: normal sinus   Rate: Normal at exte 9  Axis: normal  Ectopy: none  Conduction: normal  ST/T Waves: Nonspecific anterior lateral ST change  Q Waves: none  R Wave progression: Q waves in V1 and V2   comparison: No comparison    Clinical Impression: Sinus rhythm with possible age-indeterminate septal ischemia and nonspecific anterior lateral ST change  .    RADIOLOGY/PROCEDURES  CT-CTA COMPLETE THORACOABDOMINAL AORTA   Final Result         1. No aortic aneurysm or dissection. No acute abnormality in the chest, abdomen or pelvis.   2. Incidental two closely adjacent 4 mm nodules in the right middle lobe. Follow-up guidelines for high and low risk patients are outlined below.   3. Suspected 1.4 cm nodule in the left thyroid lobe. Recommend follow-up with outpatient ultrasound.            DX-CHEST-PORTABLE (1 VIEW)   Final Result      No acute cardiopulmonary abnormality.          LABORATORY:  Results for orders placed or performed during the hospital encounter of 05/01/19   CBC with Differential   Result Value Ref Range    WBC 5.1 4.8 - 10.8 K/uL    RBC 4.45 4.20 - 5.40 M/uL    Hemoglobin 13.6 12.0 - 16.0 g/dL    Hematocrit 40.4 37.0 - 47.0 %    MCV 90.8 81.4 - 97.8 fL    MCH 30.6 27.0 - 33.0 pg    MCHC 33.7 33.6 - 35.0 g/dL    RDW 40.2 35.9 - 50.0 fL    Platelet Count 261 164 - 446 K/uL    MPV 9.6 9.0 - 12.9 fL    Neutrophils-Polys 56.50 44.00 - 72.00 %    Lymphocytes 29.00 22.00 - 41.00 %    Monocytes 8.40 0.00 - 13.40 %    Eosinophils 5.70 0.00 - 6.90 %    Basophils 0.40 0.00 - 1.80 %    Immature Granulocytes 0.00 0.00 - 0.90 %    Nucleated RBC 0.00 /100 WBC    Neutrophils (Absolute) 2.88 2.00 - 7.15 K/uL    Lymphs (Absolute) 1.48 1.00 - 4.80 K/uL    Monos (Absolute) 0.43 0.00 - 0.85 K/uL    Eos (Absolute) 0.29 0.00 - 0.51 K/uL    Baso  (Absolute) 0.02 0.00 - 0.12 K/uL    Immature Granulocytes (abs) 0.00 0.00 - 0.11 K/uL    NRBC (Absolute) 0.00 K/uL   Complete Metabolic Panel (CMP)   Result Value Ref Range    Sodium 138 135 - 145 mmol/L    Potassium 3.4 (L) 3.6 - 5.5 mmol/L    Chloride 102 96 - 112 mmol/L    Co2 26 20 - 33 mmol/L    Anion Gap 10.0 0.0 - 11.9    Glucose 84 65 - 99 mg/dL    Bun 18 8 - 22 mg/dL    Creatinine 0.89 0.50 - 1.40 mg/dL    Calcium 8.9 8.4 - 10.2 mg/dL    AST(SGOT) 20 12 - 45 U/L    ALT(SGPT) 14 2 - 50 U/L    Alkaline Phosphatase 87 30 - 99 U/L    Total Bilirubin 0.6 0.1 - 1.5 mg/dL    Albumin 4.4 3.2 - 4.9 g/dL    Total Protein 7.3 6.0 - 8.2 g/dL    Globulin 2.9 1.9 - 3.5 g/dL    A-G Ratio 1.5 g/dL   Troponin   Result Value Ref Range    Troponin I 0.04 0.00 - 0.04 ng/mL       INTERVENTIONS:  Medications   aspirin (ASA) tablet 325 mg (not administered)       COURSE & MEDICAL DECISION MAKING  This patient presents with atypical brief pleuritic chest pain from the right scapula radiating through to the anterior chest.  She has risk factors for coronary artery disease and thoracic dissection.  There is no evidence of thoracic dissection or aortic aneurysm.  There is no myocardial infarction based on initial troponin.  Patient was so atypical second troponin not necessary.  Patient is still low risk by heart score of 2.  She does need follow-up with cardiology and consideration of outpatient cardiac stress test.  Case discussed with Dr. Kelly cardiology who approved outpatient follow-up.  Appointment scheduled through the ER  the morning after care and the patient was called by the  with the appointment..  Patient also has a pulmonary nodule which is low risk which she will discuss with her primary physician and a thyroid nodule for which she will request an outpatient ultrasound.    This patient has borderline or elevated blood pressure as recorded above and was instructed to followup with primary physician for  comprehensive blood pressure evaluation and yearly fasting cholesterol assessment according to to CMS protocol.    PLAN:    Nonspecific chest pain handout given  Return for rtness of breath, chest pain lasting longer than 10 minutes, leg swelling or ill appearance    Sushma Gomez M.D.  [address]   [address]  [facility] [address]  [phone]    Schedule an appointment as soon as possible for a visit in 2 days  to discuss stress test  Actual appointment arranged via the ER     CONDITION: Stable.    FINAL IMPRESSION  1. Other chest pain    2. Thyroid nodule    3.      Pulmonary nodule low risk      Electronically signed by: Jersey Foreman, 5/1/2019 9:59 PM

## 2019-05-04 ASSESSMENT — ENCOUNTER SYMPTOMS
ABDOMINAL PAIN: 0
BACK PAIN: 1
WHEEZING: 0
NERVOUS/ANXIOUS: 0
VOMITING: 0
HEADACHES: 0
HEARTBURN: 1
COUGH: 0
MYALGIAS: 1
WEAKNESS: 0
NAUSEA: 0
PALPITATIONS: 0
CHILLS: 0
DIZZINESS: 0
ORTHOPNEA: 0
SPUTUM PRODUCTION: 0
TINGLING: 0
SENSORY CHANGE: 0
FEVER: 0
SHORTNESS OF BREATH: 1

## 2019-06-03 DIAGNOSIS — E03.9 ACQUIRED HYPOTHYROIDISM: ICD-10-CM

## 2019-06-03 RX ORDER — LEVOTHYROXINE SODIUM 0.12 MG/1
125 TABLET ORAL
Qty: 90 TAB | Refills: 0 | Status: SHIPPED | OUTPATIENT
Start: 2019-06-03 | End: 2019-09-24 | Stop reason: SDUPTHER

## 2019-06-03 NOTE — TELEPHONE ENCOUNTER
Requested Prescriptions     Pending Prescriptions Disp Refills   • levothyroxine (SYNTHROID) 125 MCG Tab 90 Tab 0     Sig: Take 1 Tab by mouth Every morning on an empty stomach. Please have TSH rechecked prior to completing 90 day supply       ERYN Sawant.

## 2019-06-03 NOTE — TELEPHONE ENCOUNTER
Was the patient seen in the last year in this department? YesLOV  11/21/18 LABS TSH Ordered NOT COMPLETE    Does patient have an active prescription for medications requested? No     Received Request Via: Pharmacy

## 2019-06-27 ENCOUNTER — TELEPHONE (OUTPATIENT)
Dept: CARDIOLOGY | Facility: MEDICAL CENTER | Age: 64
End: 2019-06-27

## 2019-06-27 NOTE — TELEPHONE ENCOUNTER
Could not leave message for pt phone number seems that goes to a placed of business. Did not wanted to leave message.

## 2019-08-02 ENCOUNTER — TELEPHONE (OUTPATIENT)
Dept: CARDIOLOGY | Facility: MEDICAL CENTER | Age: 64
End: 2019-08-02

## 2019-08-07 ENCOUNTER — OFFICE VISIT (OUTPATIENT)
Dept: CARDIOLOGY | Facility: MEDICAL CENTER | Age: 64
End: 2019-08-07
Payer: COMMERCIAL

## 2019-08-07 VITALS
HEIGHT: 68 IN | HEART RATE: 66 BPM | OXYGEN SATURATION: 95 % | SYSTOLIC BLOOD PRESSURE: 120 MMHG | BODY MASS INDEX: 32.28 KG/M2 | WEIGHT: 213 LBS | DIASTOLIC BLOOD PRESSURE: 74 MMHG

## 2019-08-07 DIAGNOSIS — I25.10 CORONARY ARTERY CALCIFICATION SEEN ON CAT SCAN: ICD-10-CM

## 2019-08-07 DIAGNOSIS — E78.00 ELEVATED CHOLESTEROL: ICD-10-CM

## 2019-08-07 DIAGNOSIS — Z91.89 OTHER SPECIFIED PERSONAL RISK FACTORS, NOT ELSEWHERE CLASSIFIED: ICD-10-CM

## 2019-08-07 DIAGNOSIS — E03.9 ACQUIRED HYPOTHYROIDISM: ICD-10-CM

## 2019-08-07 PROCEDURE — 99204 OFFICE O/P NEW MOD 45 MIN: CPT | Performed by: INTERNAL MEDICINE

## 2019-08-07 ASSESSMENT — ENCOUNTER SYMPTOMS
MYALGIAS: 0
FEVER: 0
CHILLS: 0
PALPITATIONS: 0
ORTHOPNEA: 0
INSOMNIA: 0
DIZZINESS: 0
LOSS OF CONSCIOUSNESS: 0
BLURRED VISION: 0
PND: 0
SHORTNESS OF BREATH: 0
ABDOMINAL PAIN: 0

## 2019-08-07 NOTE — PROGRESS NOTES
Chief Complaint   Patient presents with   • Chest Pain       Subjective:   Freddy Smith is a 64 y.o. female who presents today self-referred for cardiovascular evaluation.    The patient has significant family history of thoracic aneurysm in both her father and paternal grandmother and her mother suffered a heart attack in her 50s in addition to hypothyroidism.    The patient has a history of a heart murmur since birth but has had no further evaluation.    The patient denies a history of hypertension, diabetes mellitus, hyperlipidemia or smoking history.    The patient remains physically active and denies symptoms of chest discomfort, shortness of breath or palpitations.    On 5/1/2019 the patient was seen in the emergency room for upper back pain of approximately 3 to 4 days after doing fairly strenuous labor.  Thoracoabdominal CTA was negative for aneurysm.  EKG was abnormal showing a previous MI.  Troponin levels were normal.  Patient was discharged home.    Past Medical History:   Diagnosis Date   • Postmenopausal 2011   • Unspecified disorder of thyroid    • Vitamin D deficiency      Past Surgical History:   Procedure Laterality Date   • VENTRAL HERNIA REPAIR  9/7/2011    Performed by SAVANAH QUIÑONES at SURGERY [facility]   • PANNICULECTOMY  9/7/2011    Performed by SAVANAH QUIÑONES at SURGERY [facility]   • OTHER ABDOMINAL SURGERY  1990/2001/2006     incisional hernia repair     Family History   Problem Relation Age of Onset   • Heart Disease Mother    • Cancer Mother         breast   • Diabetes Mother    • Heart Attack Mother    • Other Father         AAA   • No Known Problems Sister    • Alcohol/Drug Brother    • Alcohol/Drug Brother    • No Known Problems Sister    • Other Paternal Aunt         MS   • No Known Problems Maternal Grandmother    • No Known Problems Maternal Grandfather    • Heart Attack Paternal Grandmother         Thoracic Aneurysm   • Other Paternal Grandfather         TB      Social History     Socioeconomic History   • Marital status:      Spouse name: Not on file   • Number of children: Not on file   • Years of education: Not on file   • Highest education level: Not on file   Occupational History   • Not on file   Social Needs   • Financial resource strain: Not on file   • Food insecurity:     Worry: Not on file     Inability: Not on file   • Transportation needs:     Medical: Not on file     Non-medical: Not on file   Tobacco Use   • Smoking status: Never Smoker   • Smokeless tobacco: Never Used   Substance and Sexual Activity   • Alcohol use: No   • Drug use: No   • Sexual activity: Yes     Partners: Male     Birth control/protection: Post-Menopausal     Comment:  40 years   Lifestyle   • Physical activity:     Days per week: Not on file     Minutes per session: Not on file   • Stress: Not on file   Relationships   • Social connections:     Talks on phone: Not on file     Gets together: Not on file     Attends Jewish service: Not on file     Active member of club or organization: Not on file     Attends meetings of clubs or organizations: Not on file     Relationship status: Not on file   • Intimate partner violence:     Fear of current or ex partner: Not on file     Emotionally abused: Not on file     Physically abused: Not on file     Forced sexual activity: Not on file   Other Topics Concern   • Not on file   Social History Narrative   • Not on file     No Known Allergies  Outpatient Encounter Medications as of 8/7/2019   Medication Sig Dispense Refill   • aspirin EC (ECOTRIN) 81 MG Tablet Delayed Response Take 81 mg by mouth every day.     • levothyroxine (SYNTHROID) 125 MCG Tab Take 1 Tab by mouth Every morning on an empty stomach. Please have TSH rechecked prior to completing 90 day supply 90 Tab 0   • vitamin D, Ergocalciferol, (DRISDOL) 93580 units Cap capsule Take 1 Cap by mouth every 7 days. Take with food. 12 Cap 3   • acetaminophen (TYLENOL) 500 MG  "Tab Take 500-1,000 mg by mouth every 6 hours as needed.       No facility-administered encounter medications on file as of 8/7/2019.      Review of Systems   Constitutional: Negative for chills and fever.   HENT: Negative for congestion.    Eyes: Negative for blurred vision.   Respiratory: Negative for shortness of breath.    Cardiovascular: Negative for chest pain, palpitations, orthopnea, leg swelling and PND.   Gastrointestinal: Negative for abdominal pain.   Genitourinary: Negative for dysuria.   Musculoskeletal: Negative for joint pain and myalgias.   Skin: Negative for rash.   Neurological: Negative for dizziness and loss of consciousness.   Endo/Heme/Allergies: Positive for environmental allergies.   Psychiatric/Behavioral: The patient does not have insomnia.         Objective:   /74 (BP Location: Left arm, Patient Position: Sitting, BP Cuff Size: Adult)   Pulse 66   Ht 1.715 m (5' 7.5\")   Wt 96.6 kg (213 lb)   LMP 07/25/2011   SpO2 95%   BMI 32.87 kg/m²     Physical Exam   Constitutional: She is oriented to person, place, and time. She appears well-developed and well-nourished. No distress.   HENT:   Head: Normocephalic and atraumatic.   Eyes: Pupils are equal, round, and reactive to light. Conjunctivae and EOM are normal. No scleral icterus.   Neck: Normal range of motion. Neck supple. No JVD present. No thyromegaly present.   Cardiovascular: Normal rate, regular rhythm and intact distal pulses. Exam reveals no gallop and no friction rub.   Murmur heard.  Pulmonary/Chest: Effort normal and breath sounds normal. No accessory muscle usage. No respiratory distress. She has no wheezes. She has no rales.   Abdominal: Soft. Bowel sounds are normal. She exhibits no abdominal bruit, no pulsatile midline mass and no mass. There is no hepatosplenomegaly. There is no tenderness.   Musculoskeletal: She exhibits no edema.   Neurological: She is alert and oriented to person, place, and time. Coordination " normal.   Skin: Skin is warm, dry and intact. No rash noted. No cyanosis. Nails show no clubbing.   Psychiatric: She has a normal mood and affect. Her behavior is normal.   Vitals reviewed.    EKG 05/01/2019 normal sinus rhythm, rate 69.  Anterior septal MI, age-indeterminate.  Tracing personally reviewed.    CHEST/ABDOMINAL CTA 05/01/2019  1. No aortic aneurysm or dissection. No acute abnormality in the chest, abdomen or pelvis.  2. Incidental two closely adjacent 4 mm nodules in the right middle lobe. Follow-up guidelines for high and low risk patients are outlined below.  3. Suspected 1.4 cm nodule in the left thyroid lobe. Recommend follow-up with outpatient ultrasound.    Assessment:     1. Coronary artery calcification seen on CAT scan  LIPID PANEL    Comp Metabolic Panel   2. Elevated cholesterol  LIPID PANEL    Comp Metabolic Panel   3. Other specified personal risk factors, not elsewhere classified  CT-CARDIAC SCORING    LIPID PANEL    Comp Metabolic Panel   4. Acquired hypothyroidism  TSH+FREE T4       Medical Decision Making:  Today's Assessment / Status / Plan:     Assessment  1.  Coronary artery disease manifest by the presence of coronary calcification on CT scan.  2.  Systolic murmur.  3.  Hypercholesterolemia, untreated.  4.  Family history of cardiovascular disease.  5.  Hypothyroidism.    Recommendation Discussion  1.  I reviewed with the patient the images of her chest thoracoabdominal CTA which shows no evidence of thoracic aortic aneurysm aneurysm however there is the presence of coronary calcification.  2.  Coronary calcification scan.  3.  Echocardiogram to assess systolic murmur.  4.  I reviewed her previous laboratory tests from 2017 which showed an elevated cholesterol level.  5.  Recheck lipid panel.  6.  Make further recommendations based on above evaluation.

## 2019-08-09 ENCOUNTER — HOSPITAL ENCOUNTER (OUTPATIENT)
Dept: LAB | Facility: MEDICAL CENTER | Age: 64
End: 2019-08-09
Attending: INTERNAL MEDICINE
Payer: COMMERCIAL

## 2019-08-09 DIAGNOSIS — I25.10 CORONARY ARTERY CALCIFICATION SEEN ON CAT SCAN: ICD-10-CM

## 2019-08-09 DIAGNOSIS — E78.00 ELEVATED CHOLESTEROL: ICD-10-CM

## 2019-08-09 DIAGNOSIS — Z91.89 OTHER SPECIFIED PERSONAL RISK FACTORS, NOT ELSEWHERE CLASSIFIED: ICD-10-CM

## 2019-08-09 LAB
ALBUMIN SERPL BCP-MCNC: 4 G/DL (ref 3.2–4.9)
ALBUMIN/GLOB SERPL: 1.4 G/DL
ALP SERPL-CCNC: 87 U/L (ref 30–99)
ALT SERPL-CCNC: 13 U/L (ref 2–50)
ANION GAP SERPL CALC-SCNC: 7 MMOL/L (ref 0–11.9)
AST SERPL-CCNC: 17 U/L (ref 12–45)
BILIRUB SERPL-MCNC: 0.5 MG/DL (ref 0.1–1.5)
BUN SERPL-MCNC: 18 MG/DL (ref 8–22)
CALCIUM SERPL-MCNC: 9.2 MG/DL (ref 8.5–10.5)
CHLORIDE SERPL-SCNC: 105 MMOL/L (ref 96–112)
CHOLEST SERPL-MCNC: 205 MG/DL (ref 100–199)
CO2 SERPL-SCNC: 27 MMOL/L (ref 20–33)
CREAT SERPL-MCNC: 0.84 MG/DL (ref 0.5–1.4)
FASTING STATUS PATIENT QL REPORTED: NORMAL
GLOBULIN SER CALC-MCNC: 2.9 G/DL (ref 1.9–3.5)
GLUCOSE SERPL-MCNC: 75 MG/DL (ref 65–99)
HDLC SERPL-MCNC: 57 MG/DL
LDLC SERPL CALC-MCNC: 132 MG/DL
POTASSIUM SERPL-SCNC: 4.1 MMOL/L (ref 3.6–5.5)
PROT SERPL-MCNC: 6.9 G/DL (ref 6–8.2)
SODIUM SERPL-SCNC: 139 MMOL/L (ref 135–145)
T4 FREE SERPL-MCNC: 0.91 NG/DL (ref 0.53–1.43)
TRIGL SERPL-MCNC: 79 MG/DL (ref 0–149)
TSH SERPL DL<=0.005 MIU/L-ACNC: 10.67 UIU/ML (ref 0.38–5.33)

## 2019-08-09 PROCEDURE — 80061 LIPID PANEL: CPT

## 2019-08-09 PROCEDURE — 84439 ASSAY OF FREE THYROXINE: CPT

## 2019-08-09 PROCEDURE — 84443 ASSAY THYROID STIM HORMONE: CPT

## 2019-08-09 PROCEDURE — 80053 COMPREHEN METABOLIC PANEL: CPT

## 2019-08-09 PROCEDURE — 36415 COLL VENOUS BLD VENIPUNCTURE: CPT

## 2019-09-24 DIAGNOSIS — E03.9 ACQUIRED HYPOTHYROIDISM: ICD-10-CM

## 2019-09-24 NOTE — TELEPHONE ENCOUNTER
Was the patient seen in the last year in this department? No 11/21/18    Does patient have an active prescription for medications requested? No     Received Request Via: Pharmacy     Patient has not been seen this year.

## 2019-09-25 RX ORDER — LEVOTHYROXINE SODIUM 0.12 MG/1
125 TABLET ORAL
Qty: 30 TAB | Refills: 0 | Status: SHIPPED | OUTPATIENT
Start: 2019-09-25 | End: 2019-11-06 | Stop reason: SDUPTHER

## 2019-09-25 NOTE — TELEPHONE ENCOUNTER
Requested Prescriptions     Pending Prescriptions Disp Refills   • levothyroxine (SYNTHROID) 125 MCG Tab [Pharmacy Med Name: LEVOTHYROXIN 125MCG TAB] 30 Tab 0     Sig: Take 1 Tab by mouth Every morning on an empty stomach. Please make appointment to review labs prior to needing another refill.       ERYN Sawant.

## 2019-10-24 ENCOUNTER — OFFICE VISIT (OUTPATIENT)
Dept: MEDICAL GROUP | Facility: PHYSICIAN GROUP | Age: 64
End: 2019-10-24
Payer: COMMERCIAL

## 2019-10-24 VITALS
DIASTOLIC BLOOD PRESSURE: 80 MMHG | SYSTOLIC BLOOD PRESSURE: 114 MMHG | HEIGHT: 68 IN | OXYGEN SATURATION: 98 % | BODY MASS INDEX: 31.98 KG/M2 | WEIGHT: 211 LBS | HEART RATE: 66 BPM | TEMPERATURE: 97.2 F

## 2019-10-24 DIAGNOSIS — E78.2 MIXED HYPERLIPIDEMIA: ICD-10-CM

## 2019-10-24 DIAGNOSIS — E03.9 ACQUIRED HYPOTHYROIDISM: ICD-10-CM

## 2019-10-24 DIAGNOSIS — M15.9 GENERALIZED OSTEOARTHRITIS: ICD-10-CM

## 2019-10-24 DIAGNOSIS — R73.01 ELEVATED FASTING GLUCOSE: ICD-10-CM

## 2019-10-24 DIAGNOSIS — E04.1 THYROID NODULE: ICD-10-CM

## 2019-10-24 DIAGNOSIS — Z11.59 NEED FOR HEPATITIS C SCREENING TEST: ICD-10-CM

## 2019-10-24 DIAGNOSIS — E66.9 OBESITY (BMI 30-39.9): ICD-10-CM

## 2019-10-24 DIAGNOSIS — R91.1 LUNG NODULE: ICD-10-CM

## 2019-10-24 DIAGNOSIS — R79.89 LOW SERUM VITAMIN D: ICD-10-CM

## 2019-10-24 PROBLEM — M79.671 RIGHT FOOT PAIN: Status: RESOLVED | Noted: 2018-11-21 | Resolved: 2019-10-24

## 2019-10-24 PROCEDURE — 99396 PREV VISIT EST AGE 40-64: CPT | Performed by: NURSE PRACTITIONER

## 2019-10-24 RX ORDER — ASPIRIN 325 MG
325 TABLET ORAL EVERY 6 HOURS PRN
COMMUNITY
End: 2023-03-15

## 2019-10-24 ASSESSMENT — PATIENT HEALTH QUESTIONNAIRE - PHQ9: CLINICAL INTERPRETATION OF PHQ2 SCORE: 0

## 2019-10-24 NOTE — ASSESSMENT & PLAN NOTE
This is a new problem to the examiner. The patient was seen in May 2019 for chest pain.  At that time, the patient had a CT angiogram of the chest and abdomen with and without contrast.  It was noted on the CT scan: Suspected 1.4 cm nodule in the left thyroid lobe. Recommend follow-up with outpatient ultrasound.  The patient has not had an ultrasound of her thyroid completed.   67

## 2019-10-24 NOTE — ASSESSMENT & PLAN NOTE
This is a new problem to the examiner. The patient was seen in May 2019 for chest pain.  At that time, the patient had a CT angiogram of the chest and abdomen with and without contrast.  It was noted on the CT scan: Incidental two closely adjacent 4 mm nodules in the right middle lobe.  The patient was referred to cardiology and was seen in August 2019.  Patient was advised to follow-up with PCP for further work-up of lung nodules.

## 2019-10-24 NOTE — PROGRESS NOTES
"CC:   Chief Complaint   Patient presents with   • Orders Needed     Labs   • Hypothyroidism     HISTORY OF THE PRESENT ILLNESS: Patient is a 63 y.o. female. This pleasant patient is here today for annual exam and to request orders for updated labs.    Health Maintenance: Completed    Mixed hyperlipidemia  This is a new problem to the examiner.  Chronic, uncontrolled. Not taking any cholesterol lowering medications.  Declines starting a statin medication at this time.  Patient was seen by cardiology and had a CT cardiac scoring scan ordered, plans to complete this and updated labs before making a decision on medications.  Reports diet is \"good\".  She is exercising regularly.  She is not taking ASA daily.  She denies dizziness, claudication, or chest pain.  Due for updated lab work.   4/6/2016 08:44 1/9/2017 09:33 11/21/2018 14:24 8/9/2019 11:32   Cholesterol,Tot 189 180 217 (H) 205 (H)   Triglycerides 74 83 84 79   HDL 58 54 61 57    (H) 109 (H) 139 (H) 132 (H)       Low serum vitamin D  Chronic, stable.  Continues to take vitamin d 50,000 units 1 time per week.   Due for updated lab work.   7/14/2015 18:10 4/6/2016 08:44 1/9/2017 09:33 11/21/2018 14:24   25-Hydroxy   Vitamin D 25 50 47 58 69       Hypothyroidism  Chronic, uncontrolled.  Currently taking levothyroxine 125 Mcg daily as prescribed.   Due for updated lab work.  Reports: Hot flashes related to postmenopausal syndrome.  Denies symptoms including mood changes, anxiety/depression, chest pain/pressure, palpitations, fatigue, heat/cold intolerance, polydipsia, polyuria, diarrhea/constipation, abdominal pain, unexpected weight change, skin changes, hair thickening/coarsening/falling out/thinning, brittle nails, or edema.   4/6/2016 08:44 1/9/2017 09:33 11/21/2018 14:24 8/9/2019 11:32   TSH 2.490 0.790 7.950 (H) 10.670 (H)   Free T-4  1.33 1.29 0.91       Elevated fasting glucose  Chronic problem for the patient that is stable.  The patient has a family " "history of diabetes and continues to request annual labs.  Patient reports that she limits sugar and carb intake.   4/6/2016 08:44 1/9/2017 09:33 11/21/2018 14:24 5/1/2019 21:15 8/9/2019 11:32   Glucose 91 88 78 84 75      4/6/2016 08:44 1/9/2017 09:33 11/21/2018 14:24   Glycohemoglobin 5.7 (H) 5.5 5.7 (H)   Estim. Avg Glu 117 111 117       Obesity (BMI 30-39.9)  This is a chronic problem for the patient that is ongoing.  Patient reports that she is working on losing weight with a goal of 1 pound of weight loss per week.  Patient states that she is working on healthy diet, eating oatmeal with blueberries every morning, not eating after 7 PM.  The patient is also very active and gets at least 10,000 steps in per day and uses her home treadmill.  Vitals 5/1/2019 8/7/2019 10/24/2019   WEIGHT 212.74 213 211   HEIGHT 5' 7\" 5' 7.5\" 5' 7.5\"   BMI 33.32 kg/m2 32.87 kg/m2 32.56 kg/m2       Generalized osteoarthritis  This is a chronic problem for the patient that is ongoing.  Patient continues to have pain in bilateral hands and bilateral knees, and most recently right bicep area.  Patient will occasionally take over-the-counter Tylenol or aspirin if the pain is severe.  Otherwise, patient states that she remains physically active and does stretching.  The patient does follow with rheumatology for this issue.    Lung nodule  This is a new problem to the examiner. The patient was seen in May 2019 for chest pain.  At that time, the patient had a CT angiogram of the chest and abdomen with and without contrast.  It was noted on the CT scan: Incidental two closely adjacent 4 mm nodules in the right middle lobe.  The patient was referred to cardiology and was seen in August 2019.  Patient was advised to follow-up with PCP for further work-up of lung nodules.    Thyroid nodule  This is a new problem to the examiner. The patient was seen in May 2019 for chest pain.  At that time, the patient had a CT angiogram of the chest and " abdomen with and without contrast.  It was noted on the CT scan: Suspected 1.4 cm nodule in the left thyroid lobe. Recommend follow-up with outpatient ultrasound.  The patient has not had an ultrasound of her thyroid completed.    Allergies: Patient has no known allergies.    Current Outpatient Medications Ordered in Epic   Medication Sig Dispense Refill   • aspirin (ASA) 325 MG Tab Take 325 mg by mouth every 6 hours as needed.     • levothyroxine (SYNTHROID) 125 MCG Tab Take 1 Tab by mouth Every morning on an empty stomach. Please make appointment to review labs prior to needing another refill. 30 Tab 0   • vitamin D, Ergocalciferol, (DRISDOL) 88539 units Cap capsule Take 1 Cap by mouth every 7 days. Take with food. 12 Cap 3     No current Epic-ordered facility-administered medications on file.      Past Medical History:   Diagnosis Date   • Postmenopausal 2011   • Right foot pain 11/21/2018   • Unspecified disorder of thyroid    • Vitamin D deficiency      Past Surgical History:   Procedure Laterality Date   • VENTRAL HERNIA REPAIR  9/7/2011    Performed by SAVANAH QUIÑONES at SUR[facility]ERY [facility]   • PANNICULECTOMY  9/7/2011    Performed by SAVANAH QUIÑONES at SURGERY [facility]   • OTHER ABDOMINAL SURGERY  1990/2001/2006     incisional hernia repair     Social History     Tobacco Use   • Smoking status: Never Smoker   • Smokeless tobacco: Never Used   Substance Use Topics   • Alcohol use: No   • Drug use: No     Social History     Social History Narrative   • Not on file     Family History   Problem Relation Age of Onset   • Heart Disease Mother    • Cancer Mother         breast   • Diabetes Mother    • Heart Attack Mother    • Other Father         AAA   • No Known Problems Sister    • Alcohol/Drug Brother    • Alcohol/Drug Brother    • No Known Problems Sister    • Other Paternal Aunt         MS   • No Known Problems Maternal Grandmother    • No Known Problems Maternal Grandfather    • Heart Attack Paternal  "Grandmother         Thoracic Aneurysm   • Other Paternal Grandfather         TB     ROS:   Constitutional:  Negative for fever, chills, unexpected weight change, night sweats, body aches, sleep issues, and fatigue/generalized weakness.   HEENT:  Negative for headaches, vision changes, hearing changes, ear pain, tinnitus, ear discharge, rhinorrhea, sinus congestion, sneezing, sore throat, and neck pain.    Respiratory:  Negative for cough, shortness of breath, sputum production, hemoptysis, chest congestion, dyspnea, wheezing, and crackles.    Cardiovascular:  Negative for chest pain, palpitations, LCIONA, paroxsymal nocturnal dyspnea, orthopnea, and bilateral lower extremity edema.   Gastrointestinal:  Negative for heartburn, nausea, vomiting, abdominal pain, hematochezia, melena, diarrhea, constipation, and greasy/foul-smelling stools.   Genitourinary:  Negative for dysuria, nocturia, polyuria, hematuria, pyuria, urinary urgency, urinary frequency, and urinary incontinence.   Musculoskeletal: Positive for bilateral hand and knee pain and right upper arm pain.  Negative for myalgias, back pain, and joint pain.   Skin:  Negative for rash, sores, lumps, itching, cyanotic skin color change.   Neurological:  Negative for dizziness, tingling, tremors, focal sensory deficit, focal weakness and headaches.    Psychiatric/Behavioral: Negative for depression, suicidal/homicidal ideation and memory loss.      Exam: /80 (BP Location: Left arm, Patient Position: Sitting, BP Cuff Size: Adult)   Pulse 66   Temp 36.2 °C (97.2 °F) (Temporal)   Ht 1.715 m (5' 7.5\")   Wt 95.7 kg (211 lb)   SpO2 98%  Body mass index is 32.56 kg/m².    General:  Normal appearing. No distress.  HEENT:  Normocephalic. Eyes conjunctiva clear lids without ptosis, pupils equal and reactive to light accommodation, ears normal shape and contour.  Neck:  Supple without JVD or bruit. Thyroid is not enlarged.  Pulmonary:  Clear to ausculation.  Normal " effort. No rales, ronchi, or wheezing.  Cardiovascular:  Regular rate and rhythm without murmur. Carotid and radial pulses are intact and equal bilaterally.  Abdomen:  Soft, nontender, nondistended. Normal bowel sounds.   Neurologic:  Grossly nonfocal.  Lymph:  No cervical, supraclavicular lymph nodes are palpable.  Skin:  Warm and dry.  No obvious lesions.  Musculoskeletal:  Normal gait. No extremity cyanosis, clubbing, or edema.  Psych:  Normal mood and affect. Alert and oriented x3. Judgment and insight is normal.    Assessment/Plan  1. Acquired hypothyroidism  2. Thyroid nodule  Continue levothyroxine 125 mcg/day, does not need a refill at this time.  Due for updated lab work, will complete prior to refill.  Plan to have ultrasound of thyroid completed.  - TSH WITH REFLEX TO FT4; Future  - US-SOFT TISSUES OF HEAD - NECK; Future    3. Lung nodule  New problem found on CT scan in May 2019.  Patient denies any respiratory symptoms including shortness of breath, dyspnea, LICONA, cough.  Plan to complete CT chest without contrast.  - CT-CHEST (THORAX) W/O; Future    4. Obesity (BMI 30-39.9)  Continue working on healthy diet, exercise, weight loss.  Patient's body mass index is 32.56 kg/m². Exercise and nutrition counseling were performed at this visit.  - CBC WITHOUT DIFFERENTIAL; Future  - Patient identified as having weight management issue.  Appropriate orders and counseling given.    5. Mixed hyperlipidemia  Patient would like to complete CT cardiac scoring ordered by cardiology and update lipid profile before discussing treatment.  - Lipid Profile; Future    6. Low serum vitamin D  Continue vitamin D 50,000 units 1 time per week, does not need a refill at this time.  Due for updated labs.  - VITAMIN D,25 HYDROXY; Future    7. Elevated fasting glucose  Continue decreased sugar and carb intake.  Due for updated labs.  - Comp Metabolic Panel; Future    8. Generalized osteoarthritis  Continue follow-up with  rheumatology.  Continue exercise and stretching.    9. Need for hepatitis C screening test  - HCV Scrn ( [id number] 1xLife); Future    Educated in proper administration of medication(s) ordered today including safety, possible SE, risks, benefits, rationale and alternatives to therapy.   Supportive care, differential diagnoses, and indications for immediate follow-up discussed with patient.    Pathogenesis of diagnosis discussed including typical length and natural progression.    Instructed to return to clinic or nearest emergency department for any change in condition, further concerns, or worsening of symptoms.  Patient states understanding of the plan of care and discharge instructions.    Return for Thyroid, lung nodule, High Cholesterol, Follow up Diagnostics, Follow up Labs.    Please note that this dictation was created using voice recognition software. I have made every reasonable attempt to correct obvious errors, but I expect that there are errors of grammar and possibly content that I did not discover before finalizing the note.

## 2019-10-24 NOTE — ASSESSMENT & PLAN NOTE
Chronic, uncontrolled.  Currently taking levothyroxine 125 Mcg daily as prescribed.   Due for updated lab work.  Reports: Hot flashes related to postmenopausal syndrome.  Denies symptoms including mood changes, anxiety/depression, chest pain/pressure, palpitations, fatigue, heat/cold intolerance, polydipsia, polyuria, diarrhea/constipation, abdominal pain, unexpected weight change, skin changes, hair thickening/coarsening/falling out/thinning, brittle nails, or edema.   4/6/2016 08:44 1/9/2017 09:33 11/21/2018 14:24 8/9/2019 11:32   TSH 2.490 0.790 7.950 (H) 10.670 (H)   Free T-4  1.33 1.29 0.91

## 2019-10-24 NOTE — ASSESSMENT & PLAN NOTE
Chronic, stable.  Continues to take vitamin d 50,000 units 1 time per week.   Due for updated lab work.   7/14/2015 18:10 4/6/2016 08:44 1/9/2017 09:33 11/21/2018 14:24   25-Hydroxy   Vitamin D 25 50 47 58 69

## 2019-10-24 NOTE — ASSESSMENT & PLAN NOTE
"This is a new problem to the examiner.  Chronic, uncontrolled. Not taking any cholesterol lowering medications.  Declines starting a statin medication at this time.  Patient was seen by cardiology and had a CT cardiac scoring scan ordered, plans to complete this and updated labs before making a decision on medications.  Reports diet is \"good\".  She is exercising regularly.  She is not taking ASA daily.  She denies dizziness, claudication, or chest pain.  Due for updated lab work.   4/6/2016 08:44 1/9/2017 09:33 11/21/2018 14:24 8/9/2019 11:32   Cholesterol,Tot 189 180 217 (H) 205 (H)   Triglycerides 74 83 84 79   HDL 58 54 61 57    (H) 109 (H) 139 (H) 132 (H)     "

## 2019-10-24 NOTE — ASSESSMENT & PLAN NOTE
This is a chronic problem for the patient that is ongoing.  Patient continues to have pain in bilateral hands and bilateral knees, and most recently right bicep area.  Patient will occasionally take over-the-counter Tylenol or aspirin if the pain is severe.  Otherwise, patient states that she remains physically active and does stretching.  The patient does follow with rheumatology for this issue.

## 2019-10-24 NOTE — ASSESSMENT & PLAN NOTE
"This is a chronic problem for the patient that is ongoing.  Patient reports that she is working on losing weight with a goal of 1 pound of weight loss per week.  Patient states that she is working on healthy diet, eating oatmeal with blueberries every morning, not eating after 7 PM.  The patient is also very active and gets at least 10,000 steps in per day and uses her home treadmill.  Vitals 5/1/2019 8/7/2019 10/24/2019   WEIGHT 212.74 213 211   HEIGHT 5' 7\" 5' 7.5\" 5' 7.5\"   BMI 33.32 kg/m2 32.87 kg/m2 32.56 kg/m2     "

## 2019-10-24 NOTE — ASSESSMENT & PLAN NOTE
Chronic problem for the patient that is stable.  The patient has a family history of diabetes and continues to request annual labs.  Patient reports that she limits sugar and carb intake.   4/6/2016 08:44 1/9/2017 09:33 11/21/2018 14:24 5/1/2019 21:15 8/9/2019 11:32   Glucose 91 88 78 84 75      4/6/2016 08:44 1/9/2017 09:33 11/21/2018 14:24   Glycohemoglobin 5.7 (H) 5.5 5.7 (H)   Estim. Avg Glu 117 111 117

## 2019-11-05 ENCOUNTER — HOSPITAL ENCOUNTER (OUTPATIENT)
Dept: LAB | Facility: MEDICAL CENTER | Age: 64
End: 2019-11-05
Attending: NURSE PRACTITIONER
Payer: COMMERCIAL

## 2019-11-05 DIAGNOSIS — R79.89 LOW SERUM VITAMIN D: ICD-10-CM

## 2019-11-05 DIAGNOSIS — E66.9 OBESITY (BMI 30-39.9): ICD-10-CM

## 2019-11-05 DIAGNOSIS — Z11.59 NEED FOR HEPATITIS C SCREENING TEST: ICD-10-CM

## 2019-11-05 DIAGNOSIS — E03.9 ACQUIRED HYPOTHYROIDISM: ICD-10-CM

## 2019-11-05 DIAGNOSIS — R73.01 ELEVATED FASTING GLUCOSE: ICD-10-CM

## 2019-11-05 DIAGNOSIS — E78.2 MIXED HYPERLIPIDEMIA: ICD-10-CM

## 2019-11-05 LAB
ALBUMIN SERPL BCP-MCNC: 4.3 G/DL (ref 3.2–4.9)
ALBUMIN/GLOB SERPL: 1.5 G/DL
ALP SERPL-CCNC: 94 U/L (ref 30–99)
ALT SERPL-CCNC: 13 U/L (ref 2–50)
ANION GAP SERPL CALC-SCNC: 8 MMOL/L (ref 0–11.9)
AST SERPL-CCNC: 16 U/L (ref 12–45)
BILIRUB SERPL-MCNC: 0.7 MG/DL (ref 0.1–1.5)
BUN SERPL-MCNC: 17 MG/DL (ref 8–22)
CALCIUM SERPL-MCNC: 9.6 MG/DL (ref 8.5–10.5)
CHLORIDE SERPL-SCNC: 105 MMOL/L (ref 96–112)
CHOLEST SERPL-MCNC: 201 MG/DL (ref 100–199)
CO2 SERPL-SCNC: 28 MMOL/L (ref 20–33)
CREAT SERPL-MCNC: 0.83 MG/DL (ref 0.5–1.4)
ERYTHROCYTE [DISTWIDTH] IN BLOOD BY AUTOMATED COUNT: 43.2 FL (ref 35.9–50)
GLOBULIN SER CALC-MCNC: 2.9 G/DL (ref 1.9–3.5)
GLUCOSE SERPL-MCNC: 86 MG/DL (ref 65–99)
HCT VFR BLD AUTO: 43.4 % (ref 37–47)
HDLC SERPL-MCNC: 59 MG/DL
HGB BLD-MCNC: 14.1 G/DL (ref 12–16)
LDLC SERPL CALC-MCNC: 128 MG/DL
MCH RBC QN AUTO: 31.3 PG (ref 27–33)
MCHC RBC AUTO-ENTMCNC: 32.5 G/DL (ref 33.6–35)
MCV RBC AUTO: 96.4 FL (ref 81.4–97.8)
PLATELET # BLD AUTO: 293 K/UL (ref 164–446)
PMV BLD AUTO: 10.5 FL (ref 9–12.9)
POTASSIUM SERPL-SCNC: 4 MMOL/L (ref 3.6–5.5)
PROT SERPL-MCNC: 7.2 G/DL (ref 6–8.2)
RBC # BLD AUTO: 4.5 M/UL (ref 4.2–5.4)
SODIUM SERPL-SCNC: 141 MMOL/L (ref 135–145)
TRIGL SERPL-MCNC: 72 MG/DL (ref 0–149)
WBC # BLD AUTO: 3.6 K/UL (ref 4.8–10.8)

## 2019-11-05 PROCEDURE — 80053 COMPREHEN METABOLIC PANEL: CPT

## 2019-11-05 PROCEDURE — 82306 VITAMIN D 25 HYDROXY: CPT

## 2019-11-05 PROCEDURE — 85027 COMPLETE CBC AUTOMATED: CPT

## 2019-11-05 PROCEDURE — 84443 ASSAY THYROID STIM HORMONE: CPT

## 2019-11-05 PROCEDURE — G0472 HEP C SCREEN HIGH RISK/OTHER: HCPCS

## 2019-11-05 PROCEDURE — 80061 LIPID PANEL: CPT

## 2019-11-05 PROCEDURE — 36415 COLL VENOUS BLD VENIPUNCTURE: CPT

## 2019-11-06 DIAGNOSIS — E03.9 ACQUIRED HYPOTHYROIDISM: ICD-10-CM

## 2019-11-06 LAB
25(OH)D3 SERPL-MCNC: 100 NG/ML (ref 30–100)
HCV AB S/CO SERPL IA: NEGATIVE
TSH SERPL DL<=0.005 MIU/L-ACNC: 3.74 UIU/ML (ref 0.38–5.33)

## 2019-11-06 RX ORDER — LEVOTHYROXINE SODIUM 0.12 MG/1
125 TABLET ORAL
Qty: 90 TAB | Refills: 3 | Status: SHIPPED | OUTPATIENT
Start: 2019-11-06 | End: 2020-08-04

## 2019-11-06 NOTE — PROGRESS NOTES
Requested Prescriptions     Signed Prescriptions Disp Refills   • levothyroxine (SYNTHROID) 125 MCG Tab 90 Tab 3     Sig: Take 1 Tab by mouth Every morning on an empty stomach.       Maryann Alexander A.P.R.N.

## 2019-11-12 ENCOUNTER — OFFICE VISIT (OUTPATIENT)
Dept: DERMATOLOGY | Facility: IMAGING CENTER | Age: 64
End: 2019-11-12
Payer: COMMERCIAL

## 2019-11-12 DIAGNOSIS — D48.5 NEOPLASM OF UNCERTAIN BEHAVIOR OF SKIN: ICD-10-CM

## 2019-11-12 PROCEDURE — 11102 TANGNTL BX SKIN SINGLE LES: CPT | Performed by: DERMATOLOGY

## 2019-11-12 ASSESSMENT — ENCOUNTER SYMPTOMS
FEVER: 0
CHILLS: 0

## 2019-11-12 NOTE — PROGRESS NOTES
Dermatology Return Patient Visit    Chief Complaint   Patient presents with   • Follow-Up   • Skin Lesion       Subjective:     HPI:   Freddy Smith is a 63 y.o. female presenting for    HPI: Skin lesion   Location: back   Time present: 2 months   Painful lesion: No  Itching lesion: Yes  Enlarging lesion: yes  Anything make it better or worse?no       History of skin cancer: No  History of precancers/actinic keratoses: No  History of biopsies:No  History of blistering/severe sunburns:No  Family history of skin cancer:No  Family history of atypical moles:Yes, Details: mother on scalp      Past Medical History:   Diagnosis Date   • Postmenopausal 2011   • Right foot pain 11/21/2018   • Unspecified disorder of thyroid    • Vitamin D deficiency        Current Outpatient Medications on File Prior to Visit   Medication Sig Dispense Refill   • levothyroxine (SYNTHROID) 125 MCG Tab Take 1 Tab by mouth Every morning on an empty stomach. 90 Tab 3   • aspirin (ASA) 325 MG Tab Take 325 mg by mouth every 6 hours as needed.       No current facility-administered medications on file prior to visit.        No Known Allergies    Family History   Problem Relation Age of Onset   • Heart Disease Mother    • Cancer Mother         breast   • Diabetes Mother    • Heart Attack Mother    • Other Father         AAA   • No Known Problems Sister    • Alcohol/Drug Brother    • Alcohol/Drug Brother    • No Known Problems Sister    • Other Paternal Aunt         MS   • No Known Problems Maternal Grandmother    • No Known Problems Maternal Grandfather    • Heart Attack Paternal Grandmother         Thoracic Aneurysm   • Other Paternal Grandfather         TB       Social History     Socioeconomic History   • Marital status:      Spouse name: Not on file   • Number of children: Not on file   • Years of education: Not on file   • Highest education level: Not on file   Occupational History   • Not on file   Social Needs   • Financial  resource strain: Not on file   • Food insecurity:     Worry: Not on file     Inability: Not on file   • Transportation needs:     Medical: Not on file     Non-medical: Not on file   Tobacco Use   • Smoking status: Never Smoker   • Smokeless tobacco: Never Used   Substance and Sexual Activity   • Alcohol use: No   • Drug use: No   • Sexual activity: Yes     Partners: Male     Birth control/protection: Post-Menopausal     Comment:  40 years   Lifestyle   • Physical activity:     Days per week: Not on file     Minutes per session: Not on file   • Stress: Not on file   Relationships   • Social connections:     Talks on phone: Not on file     Gets together: Not on file     Attends [employer] service: Not on file     Active member of club or organization: Not on file     Attends meetings of clubs or organizations: Not on file     Relationship status: Not on file   • Intimate partner violence:     Fear of current or ex partner: Not on file     Emotionally abused: Not on file     Physically abused: Not on file     Forced sexual activity: Not on file   Other Topics Concern   • Not on file   Social History Narrative   • Not on file       Review of Systems   Constitutional: Negative for chills and fever.   Skin: Positive for itching. Negative for rash.        Objective:     A focused cutaneous exam was completed including: hair, face, eyelids, conjunctiva, lips, neck, back with the following pertinent findings listed below. Remaining above-listed examined areas within normal limits / negative for rashes or lesions.    There were no vitals taken for this visit.    Physical Exam   Constitutional: She is oriented to person, place, and time and well-developed, well-nourished, and in no distress.   HENT:   Head: Normocephalic and atraumatic.       Nose: Nose normal.   Eyes: Conjunctivae and lids are normal.   Neck: Normal range of motion.   Pulmonary/Chest: Effort normal.   Neurological: She is alert and oriented to person,  place, and time.   Skin: Skin is warm and dry.        Psychiatric: Mood and affect normal.       DATA: none applicable to review    Assessment and Plan:     1. Neoplasm of uncertain behavior of skin  Procedure Note   Procedure: Biopsy by shave technique  Location: back  Size: as noted in exam  Preoperative diagnosis:idn, r/o atypia  Risks, benefits and alternatives of procedure discussed, verbal consent obtained for photo (see chart) and written informed consent obtained for procedure. Time out completed. Area of biopsy prepped with alcohol. Anesthesia with 1% lidocaine with epinephrine administered with 30 gauge needle. Shave biopsy of the site performed. Hemostasis achieved with pressure and aluminum chloride. Vaseline applied to wound with bandage. Patient tolerated procedure well and there were no complications. The specimen was sent to the pathology lab by the staff. Wound care was discussed.    Followup: Return in about 1 year (around 11/12/2020), or if symptoms worsen or fail to improve.    Dhara Rollins M.D.

## 2019-11-18 ENCOUNTER — TELEPHONE (OUTPATIENT)
Dept: DERMATOLOGY | Facility: IMAGING CENTER | Age: 64
End: 2019-11-18

## 2019-11-18 NOTE — TELEPHONE ENCOUNTER
Per Dr. Rollins, patient notified of benign pathology results from 11/12/19.  Patient expressed understanding and had no further questions.

## 2019-12-28 DIAGNOSIS — R79.89 LOW SERUM VITAMIN D: ICD-10-CM

## 2019-12-30 RX ORDER — ERGOCALCIFEROL 1.25 MG/1
CAPSULE ORAL
Qty: 12 CAP | Refills: 0 | OUTPATIENT
Start: 2019-12-30

## 2019-12-30 NOTE — TELEPHONE ENCOUNTER
Patients last vitamin d level 100. She can continue with over the counter vitamin d supplement, no longer needs prescription.

## 2020-01-29 ENCOUNTER — APPOINTMENT (RX ONLY)
Dept: URBAN - METROPOLITAN AREA CLINIC 22 | Facility: CLINIC | Age: 65
Setting detail: DERMATOLOGY
End: 2020-01-29

## 2020-01-29 DIAGNOSIS — L82.1 OTHER SEBORRHEIC KERATOSIS: ICD-10-CM

## 2020-01-29 DIAGNOSIS — D22 MELANOCYTIC NEVI: ICD-10-CM

## 2020-01-29 PROBLEM — D48.5 NEOPLASM OF UNCERTAIN BEHAVIOR OF SKIN: Status: ACTIVE | Noted: 2020-01-29

## 2020-01-29 PROBLEM — D22.5 MELANOCYTIC NEVI OF TRUNK: Status: ACTIVE | Noted: 2020-01-29

## 2020-01-29 PROCEDURE — 11102 TANGNTL BX SKIN SINGLE LES: CPT

## 2020-01-29 PROCEDURE — ? COUNSELING

## 2020-01-29 PROCEDURE — 99203 OFFICE O/P NEW LOW 30 MIN: CPT | Mod: 25

## 2020-01-29 PROCEDURE — ? BIOPSY BY SHAVE METHOD

## 2020-01-29 ASSESSMENT — LOCATION SIMPLE DESCRIPTION DERM
LOCATION SIMPLE: RIGHT SHOULDER
LOCATION SIMPLE: LEFT UPPER BACK
LOCATION SIMPLE: RIGHT UPPER BACK

## 2020-01-29 ASSESSMENT — LOCATION DETAILED DESCRIPTION DERM
LOCATION DETAILED: LEFT MID-UPPER BACK
LOCATION DETAILED: RIGHT MEDIAL UPPER BACK
LOCATION DETAILED: RIGHT POSTERIOR SHOULDER

## 2020-01-29 ASSESSMENT — LOCATION ZONE DERM
LOCATION ZONE: TRUNK
LOCATION ZONE: ARM

## 2020-01-29 NOTE — PROCEDURE: BIOPSY BY SHAVE METHOD
Electrodesiccation And Curettage Text: The wound bed was treated with electrodesiccation and curettage after the biopsy was performed.
Type Of Destruction Used: Curettage
Render Post-Care Instructions In Note?: no
Dressing: bandage
Consent: Written consent was obtained and risks were reviewed including but not limited to scarring, infection, bleeding, scabbing, incomplete removal, nerve damage and allergy to anesthesia.
Biopsy Type: H and E
Anesthesia Volume In Cc: 0.5
Billing Type: Third-Party Bill
Detail Level: Detailed
Biopsy Method: Dermablade
Silver Nitrate Text: The wound bed was treated with silver nitrate after the biopsy was performed.
Was A Bandage Applied: Yes
Hemostasis: Drysol
Anesthesia Type: 0.5% lidocaine with 1:200,000 epinephrine and a 1:10 solution of 8.4% sodium bicarbonate
Post-Care Instructions: I reviewed with the patient in detail post-care instructions. Patient is to keep the biopsy site dry overnight, and then apply bacitracin twice daily until healed. Patient may apply hydrogen peroxide soaks to remove any crusting.
Additional Anesthesia Volume In Cc (Will Not Render If 0): 0
Curettage Text: The wound bed was treated with curettage after the biopsy was performed.
Lab: [id number]
Electrodesiccation Text: The wound bed was treated with electrodesiccation after the biopsy was performed.
Wound Care: Petrolatum
Lab Facility: 
Cryotherapy Text: The wound bed was treated with cryotherapy after the biopsy was performed.
Depth Of Biopsy: dermis
Notification Instructions: Patient will be notified of biopsy results. However, patient instructed to call the office if not contacted within 2 weeks.

## 2020-01-29 NOTE — HPI: UPPER BODY SKIN CHECK
How Severe Are Your Spot(S)?: mild
What Is The Reason For Today's Visit?: Upper Body Skin Exam
Additional History: Previously saw Dr. Garza at [facility]

## 2020-07-27 ENCOUNTER — TELEPHONE (OUTPATIENT)
Dept: MEDICAL GROUP | Facility: PHYSICIAN GROUP | Age: 65
End: 2020-07-27

## 2020-07-27 DIAGNOSIS — E78.2 MIXED HYPERLIPIDEMIA: ICD-10-CM

## 2020-07-27 DIAGNOSIS — E03.9 ACQUIRED HYPOTHYROIDISM: ICD-10-CM

## 2020-07-27 DIAGNOSIS — R79.89 LOW SERUM VITAMIN D: ICD-10-CM

## 2020-07-28 NOTE — TELEPHONE ENCOUNTER
Lipid panel, TSH, and vitamin D levels ordered. Please let the patient know that she will need to fast for the labs.

## 2020-07-28 NOTE — PROGRESS NOTES
1. Mixed hyperlipidemia  - Lipid Profile; Future    2. Low serum vitamin D  - VITAMIN D,25 HYDROXY; Future    3. Acquired hypothyroidism  - TSH WITH REFLEX TO FT4; Future

## 2020-08-03 ENCOUNTER — HOSPITAL ENCOUNTER (OUTPATIENT)
Dept: LAB | Facility: MEDICAL CENTER | Age: 65
End: 2020-08-03
Attending: NURSE PRACTITIONER
Payer: COMMERCIAL

## 2020-08-03 DIAGNOSIS — R79.89 LOW SERUM VITAMIN D: ICD-10-CM

## 2020-08-03 DIAGNOSIS — E03.9 ACQUIRED HYPOTHYROIDISM: ICD-10-CM

## 2020-08-03 DIAGNOSIS — E78.2 MIXED HYPERLIPIDEMIA: ICD-10-CM

## 2020-08-03 LAB
25(OH)D3 SERPL-MCNC: 45 NG/ML (ref 30–100)
CHOLEST SERPL-MCNC: 199 MG/DL (ref 100–199)
FASTING STATUS PATIENT QL REPORTED: NORMAL
HDLC SERPL-MCNC: 58 MG/DL
LDLC SERPL CALC-MCNC: 130 MG/DL
T4 FREE SERPL-MCNC: 1.17 NG/DL (ref 0.93–1.7)
TRIGL SERPL-MCNC: 57 MG/DL (ref 0–149)
TSH SERPL DL<=0.005 MIU/L-ACNC: 14 UIU/ML (ref 0.38–5.33)

## 2020-08-03 PROCEDURE — 84439 ASSAY OF FREE THYROXINE: CPT

## 2020-08-03 PROCEDURE — 84443 ASSAY THYROID STIM HORMONE: CPT

## 2020-08-03 PROCEDURE — 82306 VITAMIN D 25 HYDROXY: CPT

## 2020-08-03 PROCEDURE — 80061 LIPID PANEL: CPT

## 2020-08-03 PROCEDURE — 36415 COLL VENOUS BLD VENIPUNCTURE: CPT

## 2020-08-04 RX ORDER — LEVOTHYROXINE SODIUM 0.15 MG/1
150 TABLET ORAL
Qty: 90 TAB | Refills: 0 | Status: SHIPPED | OUTPATIENT
Start: 2020-08-04 | End: 2021-09-21 | Stop reason: SDUPTHER

## 2020-09-10 ENCOUNTER — OFFICE VISIT (OUTPATIENT)
Dept: CARDIOLOGY | Facility: MEDICAL CENTER | Age: 65
End: 2020-09-10
Payer: COMMERCIAL

## 2020-09-10 VITALS
SYSTOLIC BLOOD PRESSURE: 124 MMHG | HEART RATE: 60 BPM | BODY MASS INDEX: 31.67 KG/M2 | OXYGEN SATURATION: 98 % | DIASTOLIC BLOOD PRESSURE: 78 MMHG | HEIGHT: 68 IN | WEIGHT: 209 LBS

## 2020-09-10 DIAGNOSIS — E78.2 MIXED HYPERLIPIDEMIA: ICD-10-CM

## 2020-09-10 DIAGNOSIS — I25.10 CORONARY ARTERY CALCIFICATION SEEN ON CAT SCAN: ICD-10-CM

## 2020-09-10 DIAGNOSIS — Z86.79 HISTORY OF HEART MURMUR IN CHILDHOOD: ICD-10-CM

## 2020-09-10 DIAGNOSIS — Z91.89 OTHER SPECIFIED PERSONAL RISK FACTORS, NOT ELSEWHERE CLASSIFIED: ICD-10-CM

## 2020-09-10 PROCEDURE — 99214 OFFICE O/P EST MOD 30 MIN: CPT | Performed by: INTERNAL MEDICINE

## 2020-09-10 ASSESSMENT — FIBROSIS 4 INDEX: FIB4 SCORE: 0.95

## 2020-09-10 ASSESSMENT — ENCOUNTER SYMPTOMS
LOSS OF CONSCIOUSNESS: 0
MYALGIAS: 0
COUGH: 0
DIZZINESS: 0
SHORTNESS OF BREATH: 0
PALPITATIONS: 0

## 2020-09-10 NOTE — PROGRESS NOTES
Chief Complaint   Patient presents with   • Coronary Artery Disease     F/V DX:CAT       Subjective:   Freddy Smith is a 64 y.o. female who presents today for follow-up evaluation of coronary calcification.    Last seen 8/7/2019.    Since 8/7/2019 the patient has had no cardiac problems or symptoms.     The patient has significant family history of thoracic aneurysm in both her father and paternal grandmother and her mother suffered a heart attack in her 50s in addition to hypothyroidism.    The patient has a history of a heart murmur since birth but has had no further evaluation.  Was thoroughly evaluated with no specific findings.    The patient denies a history of hypertension, diabetes mellitus, hyperlipidemia or smoking history.    The patient remains physically active and denies symptoms of chest discomfort, shortness of breath or palpitations.    On 5/1/2019 the patient was seen in the emergency room for upper back pain of approximately 3 to 4 days after doing fairly strenuous labor.  Thoracoabdominal CTA was negative for aneurysm.  EKG was abnormal showing a previous MI.  Troponin levels were normal.  Patient was discharged home.    Past Medical History:   Diagnosis Date   • Postmenopausal 2011   • Right foot pain 11/21/2018   • Unspecified disorder of thyroid    • Vitamin D deficiency      Past Surgical History:   Procedure Laterality Date   • VENTRAL HERNIA REPAIR  9/7/2011    Performed by SAVANAH QUIÑONES at SURGERY [facility]   • PANNICULECTOMY  9/7/2011    Performed by SAVANAH QUIÑONES at SURGERY [facility]   • OTHER ABDOMINAL SURGERY  1990/2001/2006     incisional hernia repair     Family History   Problem Relation Age of Onset   • Heart Disease Mother    • Cancer Mother         breast   • Diabetes Mother    • Heart Attack Mother    • Other Father         AAA   • No Known Problems Sister    • Alcohol/Drug Brother    • Alcohol/Drug Brother    • No Known Problems Sister    • Other Paternal  Aunt         MS   • No Known Problems Maternal Grandmother    • No Known Problems Maternal Grandfather    • Heart Attack Paternal Grandmother         Thoracic Aneurysm   • Other Paternal Grandfather         TB     Social History     Socioeconomic History   • Marital status:      Spouse name: Not on file   • Number of children: Not on file   • Years of education: Not on file   • Highest education level: Not on file   Occupational History   • Not on file   Social Needs   • Financial resource strain: Not on file   • Food insecurity     Worry: Not on file     Inability: Not on file   • Transportation needs     Medical: Not on file     Non-medical: Not on file   Tobacco Use   • Smoking status: Never Smoker   • Smokeless tobacco: Never Used   Substance and Sexual Activity   • Alcohol use: No   • Drug use: No   • Sexual activity: Yes     Partners: Male     Birth control/protection: Post-Menopausal     Comment:  38 years   Lifestyle   • Physical activity     Days per week: Not on file     Minutes per session: Not on file   • Stress: Not on file   Relationships   • Social connections     Talks on phone: Not on file     Gets together: Not on file     Attends [employer] service: Not on file     Active member of club or organization: Not on file     Attends meetings of clubs or organizations: Not on file     Relationship status: Not on file   • Intimate partner violence     Fear of current or ex partner: Not on file     Emotionally abused: Not on file     Physically abused: Not on file     Forced sexual activity: Not on file   Other Topics Concern   • Not on file   Social History Narrative   • Not on file     No Known Allergies  Outpatient Encounter Medications as of 9/10/2020   Medication Sig Dispense Refill   • levothyroxine (SYNTHROID) 150 MCG Tab Take 1 Tab by mouth Every morning on an empty stomach. 90 Tab 0   • aspirin (ASA) 325 MG Tab Take 325 mg by mouth every 6 hours as needed.       No  "facility-administered encounter medications on file as of 9/10/2020.      Review of Systems   Respiratory: Negative for cough and shortness of breath.    Cardiovascular: Negative for chest pain and palpitations.   Musculoskeletal: Negative for myalgias.   Neurological: Negative for dizziness and loss of consciousness.        Objective:   /78 (BP Location: Left arm, Patient Position: Sitting, BP Cuff Size: Large adult)   Pulse 60   Ht 1.715 m (5' 7.5\")   Wt 94.8 kg (209 lb)   LMP 07/25/2011   SpO2 98%   BMI 32.25 kg/m²     Physical Exam   Constitutional: She is oriented to person, place, and time. She appears well-developed and well-nourished. No distress.   HENT:   Head: Normocephalic and atraumatic.   Eyes: No scleral icterus.   Neck: No JVD present.   Cardiovascular: Normal rate, regular rhythm and intact distal pulses. Exam reveals no gallop and no friction rub.   No murmur heard.  Pulmonary/Chest: Effort normal and breath sounds normal. No accessory muscle usage. No respiratory distress. She has no wheezes. She has no rales.   Abdominal: Soft. Bowel sounds are normal. She exhibits no abdominal bruit, no pulsatile midline mass and no mass. There is no hepatosplenomegaly. There is no abdominal tenderness.   Musculoskeletal:         General: No edema.   Neurological: She is alert and oriented to person, place, and time. Coordination normal.   Skin: Skin is warm, dry and intact. No rash noted. No cyanosis. Nails show no clubbing.   Psychiatric: She has a normal mood and affect. Her behavior is normal.   Vitals reviewed.    EKG 05/01/2019 normal sinus rhythm, rate 69.  Anterior septal MI, age-indeterminate.  Tracing personally reviewed.    CHEST/ABDOMINAL CTA 05/01/2019  1. No aortic aneurysm or dissection. No acute abnormality in the chest, abdomen or pelvis.  2. Incidental two closely adjacent 4 mm nodules in the right middle lobe. Follow-up guidelines for high and low risk patients are outlined " below.  3. Suspected 1.4 cm nodule in the left thyroid lobe. Recommend follow-up with outpatient ultrasound.    Assessment:     1. Other specified personal risk factors, not elsewhere classified  CT-CARDIAC SCORING   2. Coronary artery calcification seen on CAT scan     3. Mixed hyperlipidemia     4. History of heart murmur in childhood         Medical Decision Making:  Today's Assessment / Status / Plan:     Assessment  1.  Coronary artery disease manifest by the presence of coronary calcification on CT scan.  2.  Systolic murmur.  3.  Hypercholesterolemia, untreated.  4.  Family history of cardiovascular disease.  5.  Hypothyroidism.    Recommendation Discussion  1.  Patient stable from a cardiac standpoint.  2.  Did not appreciate a heart murmur today.  3.  Had been unable to get previous ordered diagnostic tests calcium score and echocardiogram due to COVID-19 restrictions.  4.  Coronary calcification scan ordered  5.  Make further recommendations based on above evaluation.  6.  RTC 1 year.

## 2021-03-15 DIAGNOSIS — Z23 NEED FOR VACCINATION: ICD-10-CM

## 2021-09-02 ENCOUNTER — TELEPHONE (OUTPATIENT)
Dept: MEDICAL GROUP | Facility: PHYSICIAN GROUP | Age: 66
End: 2021-09-02

## 2021-09-02 DIAGNOSIS — E78.2 MIXED HYPERLIPIDEMIA: ICD-10-CM

## 2021-09-02 DIAGNOSIS — R79.89 LOW SERUM VITAMIN D: ICD-10-CM

## 2021-09-02 DIAGNOSIS — E66.9 OBESITY (BMI 30-39.9): ICD-10-CM

## 2021-09-02 DIAGNOSIS — I25.10 CORONARY ARTERY CALCIFICATION SEEN ON CAT SCAN: ICD-10-CM

## 2021-09-02 DIAGNOSIS — Z00.00 ROUTINE HEALTH MAINTENANCE: ICD-10-CM

## 2021-09-02 DIAGNOSIS — E03.9 ACQUIRED HYPOTHYROIDISM: ICD-10-CM

## 2021-09-02 NOTE — PROGRESS NOTES
1. Routine health maintenance  - CBC WITH DIFFERENTIAL; Future  - Comp Metabolic Panel; Future  - Lipid Profile; Future  - TSH WITH REFLEX TO FT4; Future  - VITAMIN D,25 HYDROXY; Future    2. Coronary artery calcification seen on CAT scan  - Comp Metabolic Panel; Future  - Lipid Profile; Future    3. Acquired hypothyroidism  - TSH WITH REFLEX TO FT4; Future    4. Obesity (BMI 30-39.9)  - CBC WITH DIFFERENTIAL; Future  - Comp Metabolic Panel; Future  - Lipid Profile; Future    5. Mixed hyperlipidemia  - Comp Metabolic Panel; Future  - Lipid Profile; Future    6. Low serum vitamin D  - VITAMIN D,25 HYDROXY; Future

## 2021-09-15 LAB
25(OH)D3+25(OH)D2 SERPL-MCNC: 25.3 NG/ML (ref 30–100)
ALBUMIN SERPL-MCNC: 4.3 G/DL (ref 3.8–4.8)
ALBUMIN/GLOB SERPL: 2 {RATIO} (ref 1.2–2.2)
ALP SERPL-CCNC: 101 IU/L (ref 44–121)
ALT SERPL-CCNC: 12 IU/L (ref 0–32)
AST SERPL-CCNC: 18 IU/L (ref 0–40)
BASOPHILS # BLD AUTO: 0 X10E3/UL (ref 0–0.2)
BASOPHILS NFR BLD AUTO: 1 %
BILIRUB SERPL-MCNC: 0.4 MG/DL (ref 0–1.2)
BUN SERPL-MCNC: 16 MG/DL (ref 8–27)
BUN/CREAT SERPL: 21 (ref 12–28)
CALCIUM SERPL-MCNC: 9 MG/DL (ref 8.7–10.3)
CHLORIDE SERPL-SCNC: 104 MMOL/L (ref 96–106)
CHOLEST SERPL-MCNC: 207 MG/DL (ref 100–199)
CO2 SERPL-SCNC: 25 MMOL/L (ref 20–29)
CREAT SERPL-MCNC: 0.75 MG/DL (ref 0.57–1)
EOSINOPHIL # BLD AUTO: 0.1 X10E3/UL (ref 0–0.4)
EOSINOPHIL NFR BLD AUTO: 3 %
ERYTHROCYTE [DISTWIDTH] IN BLOOD BY AUTOMATED COUNT: 12.5 % (ref 11.7–15.4)
GLOBULIN SER CALC-MCNC: 2.2 G/DL (ref 1.5–4.5)
GLUCOSE SERPL-MCNC: 90 MG/DL (ref 65–99)
HCT VFR BLD AUTO: 41.1 % (ref 34–46.6)
HDLC SERPL-MCNC: 58 MG/DL
HGB BLD-MCNC: 14.1 G/DL (ref 11.1–15.9)
IMM GRANULOCYTES # BLD AUTO: 0 X10E3/UL (ref 0–0.1)
IMM GRANULOCYTES NFR BLD AUTO: 0 %
IMMATURE CELLS  115398: NORMAL
LABORATORY COMMENT REPORT: ABNORMAL
LDLC SERPL CALC-MCNC: 134 MG/DL (ref 0–99)
LYMPHOCYTES # BLD AUTO: 0.9 X10E3/UL (ref 0.7–3.1)
LYMPHOCYTES NFR BLD AUTO: 24 %
MCH RBC QN AUTO: 32.3 PG (ref 26.6–33)
MCHC RBC AUTO-ENTMCNC: 34.3 G/DL (ref 31.5–35.7)
MCV RBC AUTO: 94 FL (ref 79–97)
MONOCYTES # BLD AUTO: 0.3 X10E3/UL (ref 0.1–0.9)
MONOCYTES NFR BLD AUTO: 8 %
MORPHOLOGY BLD-IMP: NORMAL
NEUTROPHILS # BLD AUTO: 2.5 X10E3/UL (ref 1.4–7)
NEUTROPHILS NFR BLD AUTO: 64 %
NRBC BLD AUTO-RTO: NORMAL %
PLATELET # BLD AUTO: 284 X10E3/UL (ref 150–450)
POTASSIUM SERPL-SCNC: 3.9 MMOL/L (ref 3.5–5.2)
PROT SERPL-MCNC: 6.5 G/DL (ref 6–8.5)
RBC # BLD AUTO: 4.36 X10E6/UL (ref 3.77–5.28)
SODIUM SERPL-SCNC: 141 MMOL/L (ref 134–144)
T4 FREE SERPL-MCNC: 0.99 NG/DL (ref 0.82–1.77)
TRIGL SERPL-MCNC: 83 MG/DL (ref 0–149)
TSH SERPL DL<=0.005 MIU/L-ACNC: 28.5 UIU/ML (ref 0.45–4.5)
VLDLC SERPL CALC-MCNC: 15 MG/DL (ref 5–40)
WBC # BLD AUTO: 3.9 X10E3/UL (ref 3.4–10.8)

## 2021-09-21 ENCOUNTER — OFFICE VISIT (OUTPATIENT)
Dept: MEDICAL GROUP | Facility: PHYSICIAN GROUP | Age: 66
End: 2021-09-21
Payer: COMMERCIAL

## 2021-09-21 VITALS
HEART RATE: 77 BPM | BODY MASS INDEX: 32.02 KG/M2 | TEMPERATURE: 98.6 F | HEIGHT: 67 IN | DIASTOLIC BLOOD PRESSURE: 76 MMHG | WEIGHT: 204 LBS | SYSTOLIC BLOOD PRESSURE: 130 MMHG | OXYGEN SATURATION: 97 %

## 2021-09-21 DIAGNOSIS — Z12.12 SCREENING FOR COLORECTAL CANCER: ICD-10-CM

## 2021-09-21 DIAGNOSIS — I25.10 CORONARY ARTERY CALCIFICATION SEEN ON CAT SCAN: ICD-10-CM

## 2021-09-21 DIAGNOSIS — E03.9 ACQUIRED HYPOTHYROIDISM: ICD-10-CM

## 2021-09-21 DIAGNOSIS — Z00.01 ENCOUNTER FOR WELL ADULT EXAM WITH ABNORMAL FINDINGS: ICD-10-CM

## 2021-09-21 DIAGNOSIS — Z12.11 SCREENING FOR COLORECTAL CANCER: ICD-10-CM

## 2021-09-21 DIAGNOSIS — E78.2 MIXED HYPERLIPIDEMIA: ICD-10-CM

## 2021-09-21 DIAGNOSIS — E66.9 OBESITY (BMI 30-39.9): ICD-10-CM

## 2021-09-21 DIAGNOSIS — R79.89 LOW SERUM VITAMIN D: ICD-10-CM

## 2021-09-21 DIAGNOSIS — Z00.00 ROUTINE HEALTH MAINTENANCE: ICD-10-CM

## 2021-09-21 DIAGNOSIS — Z12.31 ENCOUNTER FOR SCREENING MAMMOGRAM FOR BREAST CANCER: ICD-10-CM

## 2021-09-21 DIAGNOSIS — R73.01 ELEVATED FASTING GLUCOSE: ICD-10-CM

## 2021-09-21 PROCEDURE — 99396 PREV VISIT EST AGE 40-64: CPT | Performed by: NURSE PRACTITIONER

## 2021-09-21 RX ORDER — LEVOTHYROXINE SODIUM 0.15 MG/1
150 TABLET ORAL
Qty: 90 TABLET | Refills: 3 | Status: SHIPPED | OUTPATIENT
Start: 2021-09-21 | End: 2022-01-27 | Stop reason: SDUPTHER

## 2021-09-21 ASSESSMENT — FIBROSIS 4 INDEX: FIB4 SCORE: 1.17

## 2021-09-21 ASSESSMENT — PATIENT HEALTH QUESTIONNAIRE - PHQ9: CLINICAL INTERPRETATION OF PHQ2 SCORE: 0

## 2021-09-21 NOTE — PATIENT INSTRUCTIONS
Preventing Vitamin D Deficiency  Vitamin D is a nutrient that helps your body absorb calcium from food. It plays a key role in the health of bones and teeth, muscle function, and infection prevention.  Our bodies make vitamin D when our skin is exposed to direct sunlight. However, for many people, this may not be enough vitamin D to meet the body's needs. When you get too little vitamin D, it is called a deficiency.  How can this condition affect me?  A vitamin D deficiency can put you at risk of developing conditions that cause bones to be brittle, such as rickets or osteoporosis. If you are over age 65, not having enough vitamin D may weaken your muscles and bones and increase your risk for falls and broken bones.  What can increase my risk?  You may be at risk for a vitamin D deficiency if you:  · Are pregnant.  · Are obese.  · Are over 65 years old.  · Have dark skin.  · Take certain medicines that affect the way vitamin D is absorbed.  · Have had gastric bypass surgery.  Other risk factors include:  · Having a condition that limits your ability to absorb fat, such as cystic fibrosis, celiac disease, or inflammatory bowel disease.  · Having certain inherited conditions.  · Not having access to foods rich in vitamin D.  · Having limited ability to move.  · Living in areas that have fewer hours of sunlight.  · Spending most of your day indoors, or you cover your skin all the time when you are outdoors.   infants are also at risk for vitamin D deficiency.  What actions can I take to reduce my risk of a vitamin D deficiency?  Knowing the best sources of vitamin D  You can meet your daily vitamin D needs from:  · Foods.  · Dietary supplements.  · Direct exposure to natural sunlight.  · Infant formula (for babies).  Knowing how much vitamin D you need  General recommendations for daily vitamin D intake vary by these categories:  · Infants: 400 International Units.  · Children over 1 year old: 600  International Units.  · Adults: 600 International Units.  · Pregnant and breastfeeding women: 600 International Units.  · Adults over 70 years old: 800 International Units.  These are minimum levels of recommended amounts. Your health care provider may recommend a different amount of vitamin D intake based on your specific needs and your overall health.  Getting sun exposure  · Get regular, safe exposure to natural sunlight. Expose your skin to direct sunlight for at least 15 minutes every day. If you have dark skin, you may need to expose your skin for a longer period of time.  · Protect your skin from too much sun exposure. This helps to prevent skin cancer.  · Ask your health care provider if regular sun exposure is safe for you.  · Do not use a tanning bed.  Eating and drinking    · Eat foods that naturally contain vitamin D. These include:  ? Beef liver.  ? Egg yolk.  ? Fatty fish, such as cod, salmon, trout, swordfish, shrimp, sardines, and tuna.  ? Cheese.  ? Mushrooms.  ? Oysters.  · Eat or drink products that have been fortified with vitamin D. Fortified means that vitamin D has been added to the food. These may include:  ? Cereals.  ? Dairy products, such as milk, yogurt, butter, or margarine.  ? Orange juice.  ? Alternative milks, such as soy milk or almond milk.  · When choosing foods, check the food label on the package to see:  ? How much vitamin D is in the item.  ? If the food is fortified with vitamin D.  · Although it is hard to get your vitamin D requirement from foods alone, you should eat a balanced diet each day that includes foods naturally higher in vitamin D or fortified with it. Try to include the following in your diet each day:  ? 2-3 servings of meat or meat alternatives.  ? 2-3 servings of dairy.  Taking supplements  If you are at risk for vitamin D deficiency, or if you have certain diseases, your health care provider may recommend that you take a vitamin D supplement. Make sure  you:  · Talk with your health care provider before you start taking any vitamin D supplements. You may be more sensitive to the side effects of vitamin D supplements if you are on certain medicines or have certain medical conditions.  · Tell your health care provider about all medicines you are taking, including vitamin, mineral, and herbal supplements.  · Take medicines and supplements only as told by your health care provider.  Summary  · Vitamin D is a nutrient that helps your body absorb calcium from food.  · A vitamin D deficiency can put you at risk of developing conditions that cause bones to be brittle, such as rickets or osteoporosis.  · Our bodies make vitamin D when our skin is exposed to direct sunlight. However, for many people, this may not be enough vitamin D to meet the body's needs.  · Some foods naturally contain vitamin D, including beef liver, egg yolk, and fatty fish.  · Products may also be fortified with vitamin D. Fortified means that vitamin D has been added to the food.  This information is not intended to replace advice given to you by your health care provider. Make sure you discuss any questions you have with your health care provider.  Document Released: 09/07/2016 Document Revised: 04/10/2020 Document Reviewed: 12/13/2019  [employer] Patient Education © 2020 [employer]    Preventing High Cholesterol  Cholesterol is a white, waxy substance similar to fat that the human body needs to help build cells. The liver makes all the cholesterol that a person's body needs. Having high cholesterol (hypercholesterolemia) increases a person's risk for heart disease and stroke. Extra (excess) cholesterol comes from the food the person eats.  High cholesterol can often be prevented with diet and lifestyle changes. If you already have high cholesterol, you can control it with diet and lifestyle changes and with medicine.  How can high cholesterol affect me?  If you have high cholesterol, deposits  (plaques) may build up on the walls of your arteries. The arteries are the blood vessels that carry blood away from your heart.  Plaques make the arteries narrower and stiffer. This can limit or block blood flow and cause blood clots to form. Blood clots:  · Are tiny balls of cells that form in your blood.  · Can move to the heart or brain, causing a heart attack or stroke.  Plaques in arteries greatly increase your risk for heart attack and stroke.Making diet and lifestyle changes can reduce your risk for these conditions that may threaten your life.  What can increase my risk?  This condition is more likely to develop in people who:  · Eat foods that are high in saturated fat or cholesterol. Saturated fat is mostly found in:  ? Foods that contain animal fat, such as red meat and some dairy products.  ? Certain fatty foods made from plants, such as tropical oils.  · Are overweight.  · Are not getting enough exercise.  · Have a family history of high cholesterol.  What actions can I take to prevent this?  Nutrition    · Eat less saturated fat.  · Avoid trans fats (partially hydrogenated oils). These are often found in margarine and in some baked goods, fried foods, and snacks bought in packages.  · Avoid precooked or cured meat, such as sausages or meat loaves.  · Avoid foods and drinks that have added sugars.  · Eat more fruits, vegetables, and whole grains.  · Choose healthy sources of protein, such as fish, poultry, lean cuts of red meat, beans, peas, lentils, and nuts.  · Choose healthy sources of fat, such as:  ? Nuts.  ? Vegetable oils, especially olive oil.  ? Fish that have healthy fats (omega-3 fatty acids), such as mackerel or salmon.  The items listed above may not be a complete list of recommended foods and beverages. Contact a dietitian for more information.  Lifestyle  · Lose weight if you are overweight. Losing 5-10 lb (2.3-4.5 kg) can help prevent or control high cholesterol. It can also lower your  risk for diabetes and high blood pressure. Ask your health care provider to help you with a diet and exercise plan to lose weight safely.  · Do not use any products that contain nicotine or tobacco, such as cigarettes, e-cigarettes, and chewing tobacco. If you need help quitting, ask your health care provider.  · Limit your alcohol intake.  ? Do not drink alcohol if:  § Your health care provider tells you not to drink.  § You are pregnant, may be pregnant, or are planning to become pregnant.  ? If you drink alcohol:  § Limit how much you use to:  § 0-1 drink a day for women.  § 0-2 drinks a day for men.  § Be aware of how much alcohol is in your drink. In the [place] one drink equals one 12 oz bottle of beer (355 mL), one 5 oz glass of wine (148 mL), or one 1½ oz glass of hard liquor (44 mL).  Activity    · Get enough exercise. Each week, do at least 150 minutes of exercise that takes a medium level of effort (moderate-intensity exercise).  ? This is exercise that:  § Makes your heart beat faster and makes you breathe harder than usual.  § Allows you to still be able to talk.  ? You could exercise in short sessions several times a day or longer sessions a few times a week. For example, on 5 days each week, you could walk fast or ride your bike 3 times a day for 10 minutes each time.  · Do exercises as told by your health care provider.  Medicines  · In addition to diet and lifestyle changes, your health care provider may recommend medicines to help lower cholesterol. This may be a medicine to lower the amount of cholesterol your liver makes. You may need medicine if:  ? Diet and lifestyle changes do not lower your cholesterol enough.  ? You have high cholesterol and other risk factors for heart disease or stroke.  · Take over-the-counter and prescription medicines only as told by your health care provider.  General information  · Manage your risk factors for high cholesterol. Talk with your health care provider about  all your risk factors and how to lower your risk.  · Manage other conditions that you have, such as diabetes or high blood pressure (hypertension).  · Have blood tests to check your cholesterol levels at regular points in time as told by your health care provider.  · Keep all follow-up visits as told by your health care provider. This is important.  Where to find more information  · American Heart Association: www.heart.org  · National Heart, Lung, and Blood [place]: www.nhlbi.nih.gov  Summary  · High cholesterol increases your risk for heart disease and stroke. By keeping your cholesterol level low, you can reduce your risk for these conditions.  · High cholesterol can often be prevented with diet and lifestyle changes.  · Work with your health care provider to manage your risk factors, and have your blood tested regularly.  This information is not intended to replace advice given to you by your health care provider. Make sure you discuss any questions you have with your health care provider.  Document Released: 01/01/2017 Document Revised: 04/10/2020 Document Reviewed: 08/26/2017  [employer] Patient Education © 2020 [employer]    High Cholesterol    High cholesterol is a condition in which the blood has high levels of a white, waxy, fat-like substance (cholesterol). The human body needs small amounts of cholesterol. The liver makes all the cholesterol that the body needs. Extra (excess) cholesterol comes from the food that we eat.  Cholesterol is carried from the liver by the blood through the blood vessels. If you have high cholesterol, deposits (plaques) may build up on the walls of your blood vessels (arteries). Plaques make the arteries narrower and stiffer. Cholesterol plaques increase your risk for heart attack and stroke. Work with your health care provider to keep your cholesterol levels in a healthy range.  What increases the risk?  This condition is more likely to develop in people who:  · Eat foods  "that are high in animal fat (saturated fat) or cholesterol.  · Are overweight.  · Are not getting enough exercise.  · Have a family history of high cholesterol.  What are the signs or symptoms?  There are no symptoms of this condition.  How is this diagnosed?  This condition may be diagnosed from the results of a blood test.  · If you are older than age 20, your health care provider may check your cholesterol every 4-6 years.  · You may be checked more often if you already have high cholesterol or other risk factors for heart disease.  The blood test for cholesterol measures:  · \"Bad\" cholesterol (LDL cholesterol). This is the main type of cholesterol that causes heart disease. The desired level for LDL is less than 100.  · \"Good\" cholesterol (HDL cholesterol). This type helps to protect against heart disease by cleaning the arteries and carrying the LDL away. The desired level for HDL is 60 or higher.  · Triglycerides. These are fats that the body can store or burn for energy. The desired number for triglycerides is lower than 150.  · Total cholesterol. This is a measure of the total amount of cholesterol in your blood, including LDL cholesterol, HDL cholesterol, and triglycerides. A healthy number is less than 200.  How is this treated?  This condition is treated with diet changes, lifestyle changes, and medicines.  Diet changes  · This may include eating more whole grains, fruits, vegetables, nuts, and fish.  · This may also include cutting back on red meat and foods that have a lot of added sugar.  Lifestyle changes  · Changes may include getting at least 40 minutes of aerobic exercise 3 times a week. Aerobic exercises include walking, biking, and swimming. Aerobic exercise along with a healthy diet can help you maintain a healthy weight.  · Changes may also include quitting smoking.  Medicines  · Medicines are usually given if diet and lifestyle changes have failed to reduce your cholesterol to healthy " levels.  · Your health care provider may prescribe a statin medicine. Statin medicines have been shown to reduce cholesterol, which can reduce the risk of heart disease.  Follow these instructions at home:  Eating and drinking  If told by your health care provider:  · Eat chicken (without skin), fish, veal, shellfish, ground turkey breast, and round or loin cuts of red meat.  · Do not eat fried foods or fatty meats, such as hot dogs and salami.  · Eat plenty of fruits, such as apples.  · Eat plenty of vegetables, such as broccoli, potatoes, and carrots.  · Eat beans, peas, and lentils.  · Eat grains such as barley, rice, couscous, and bulgur wheat.  · Eat pasta without cream sauces.  · Use skim or nonfat milk, and eat low-fat or nonfat yogurt and cheeses.  · Do not eat or drink whole milk, cream, ice cream, egg yolks, or hard cheeses.  · Do not eat stick margarine or tub margarines that contain trans fats (also called partially hydrogenated oils).  · Do not eat saturated tropical oils, such as coconut oil and palm oil.  · Do not eat cakes, cookies, crackers, or other baked goods that contain trans fats.    General instructions  · Exercise as directed by your health care provider. Increase your activity level with activities such as gardening, walking, and taking the stairs.  · Take over-the-counter and prescription medicines only as told by your health care provider.  · Do not use any products that contain nicotine or tobacco, such as cigarettes and e-cigarettes. If you need help quitting, ask your health care provider.  · Keep all follow-up visits as told by your health care provider. This is important.  Contact a health care provider if:  · You are struggling to maintain a healthy diet or weight.  · You need help to start on an exercise program.  · You need help to stop smoking.  Get help right away if:  · You have chest pain.  · You have trouble breathing.  This information is not intended to replace advice given  to you by your health care provider. Make sure you discuss any questions you have with your health care provider.  Document Released: 12/18/2006 Document Revised: 12/21/2018 Document Reviewed: 06/17/2017  [employer] Patient Education © 2020 [employer]

## 2021-09-21 NOTE — ASSESSMENT & PLAN NOTE
Chronic, ongoing.  No longer taking vitamin D supplement.  Interested in increasing vitamin D through diet.

## 2021-09-21 NOTE — ASSESSMENT & PLAN NOTE
Chronic, currently uncontrolled.  Patient was taking levothyroxine 150 mcg daily, has not taken the medication in about 1 year.  Most recent TSH elevated at 28.500, free T4 normal at 0.99. Denies symptoms including mood changes, palpitations, fatigue, heat/cold intolerance, polydipsia, polyuria, diarrhea/constipation, abdominal pain, weight gain, skin changes, hair changes, hair loss, brittle nails, or edema.

## 2021-09-21 NOTE — ASSESSMENT & PLAN NOTE
"Chronic, ongoing without medication.  Declines starting statin medication at this time.  Prefers to implement healthy diet and lifestyle measures.  She was seen by cardiology in the past and had a CT cardiac scoring scan completed, requesting referral back to cardiology for follow-up.  Reports that she is reading a book \"How Not to Die\" and states that by implementing healthy and good foods she has had a gradual weight loss of 10 pounds as well as decreased arthritis pain.   The 10-year ASCVD risk score (Kait SUSANA Jr., et al., 2013) is: 5.1%   "

## 2021-09-21 NOTE — PROGRESS NOTES
"Subjective:   CC:   Chief Complaint   Patient presents with   • Annual Exam   • Medication Management     Not taking her medication      HPI:   Freddy Smith is a 66 y.o. female who presents for annual exam    Obesity (BMI 30-39.9)  Chronic, improving.  Reports that she remains active walking 6000-19124 steps daily.  Reports that she does not like to sit still and regularly lifts 130 pounds at a time.  She does monitor her blood pressure at home and reports that it is generally around 130 systolic.    Acquired hypothyroidism  Chronic, currently uncontrolled.  Patient was taking levothyroxine 150 mcg daily, has not taken the medication in about 1 year.  Most recent TSH elevated at 28.500, free T4 normal at 0.99. Denies symptoms including mood changes, palpitations, fatigue, heat/cold intolerance, polydipsia, polyuria, diarrhea/constipation, abdominal pain, weight gain, skin changes, hair changes, hair loss, brittle nails, or edema.     Elevated fasting glucose  Resolved.     Low serum vitamin D  Chronic, ongoing.  No longer taking vitamin D supplement.  Interested in increasing vitamin D through diet.    Mixed hyperlipidemia  Coronary artery calcification seen on CAT scan  Chronic, ongoing without medication.  Declines starting statin medication at this time.  Prefers to implement healthy diet and lifestyle measures.  She was seen by cardiology in the past and had a CT cardiac scoring scan completed, requesting referral back to cardiology for follow-up.  Reports that she is reading a book \"How Not to Die\" and states that by implementing healthy and good foods she has had a gradual weight loss of 10 pounds as well as decreased arthritis pain.   The 10-year ASCVD risk score (Kait SUSANA Jr., et al., 2013) is: 5.1%      Patient has GYN provider: No   Last Pap Smear: Unknown  Last Mammogram: Unknown  Last Bone Density Test: N/A  Last Colorectal Cancer Screenin  Last Tdap: 2018  Received HPV series: Aged " out    Exercise: Walks 6000-79650 steps daily, frequently lifting heavy rocks  Diet: Healthy    OB History    Para Term  AB Living   4 4 4 0 0 4   SAB TAB Ectopic Molar Multiple Live Births   0 0 0 0 0 4      She  reports being sexually active and has had partner(s) who are male. She reports using the following method of birth control/protection: Post-Menopausal.  She  has a past medical history of Elevated fasting glucose (2017), Hyperlipidemia, Postmenopausal (), Right foot pain (2018), Unspecified disorder of thyroid, and Vitamin D deficiency.  She  has a past surgical history that includes other abdominal surgery (/); ventral hernia repair (2011); and panniculectomy (2011).    Family History   Problem Relation Age of Onset   • Heart Disease Mother    • Cancer Mother         breast   • Diabetes Mother    • Heart Attack Mother    • Other Father         AAA   • No Known Problems Sister    • Alcohol/Drug Brother    • Alcohol/Drug Brother    • No Known Problems Sister    • Other Paternal Aunt         MS   • No Known Problems Maternal Grandmother    • No Known Problems Maternal Grandfather    • Heart Attack Paternal Grandmother         Thoracic Aneurysm   • Other Paternal Grandfather         TB     Social History     Tobacco Use   • Smoking status: Never Smoker   • Smokeless tobacco: Never Used   Vaping Use   • Vaping Use: Never used   Substance Use Topics   • Alcohol use: No   • Drug use: No     Patient Active Problem List    Diagnosis Date Noted   • History of heart murmur in childhood 09/10/2020   • Thyroid nodule 10/24/2019   • Lung nodule 10/24/2019   • Coronary artery calcification seen on CAT scan 2019   • Mixed hyperlipidemia 2019   • Obesity (BMI 30-39.9) 2017   • Low serum vitamin D 2017   • Generalized osteoarthritis 2014   • Acquired hypothyroidism 2014     Current Outpatient Medications   Medication Sig Dispense Refill  "  • levothyroxine (SYNTHROID) 150 MCG Tab Take 1 Tablet by mouth every morning on an empty stomach. 90 Tablet 3   • aspirin (ASA) 325 MG Tab Take 325 mg by mouth every 6 hours as needed.       No current facility-administered medications for this visit.     No Known Allergies    Review of Systems   Constitutional: Negative for fever, chills and malaise/fatigue.   HENT: Negative for congestion.    Eyes: Negative for pain.   Respiratory: Negative for cough and shortness of breath.    Cardiovascular: Negative for chest pain and leg swelling.   Gastrointestinal: Negative for nausea, vomiting, abdominal pain and diarrhea.   Genitourinary: Negative for dysuria and hematuria.   Skin: Negative for rash.   Neurological: Negative for dizziness, focal weakness and headaches.   Endo/Heme/Allergies: Does not bruise/bleed easily.   Psychiatric/Behavioral: Negative for depression.  The patient is not nervous/anxious.      Objective:   /76 (BP Location: Left arm, Patient Position: Sitting, BP Cuff Size: Adult)   Pulse 77   Temp 37 °C (98.6 °F) (Temporal)   Ht 1.702 m (5' 7\")   Wt 92.5 kg (204 lb)   LMP 07/25/2011   SpO2 97%   BMI 31.95 kg/m²     Wt Readings from Last 4 Encounters:   09/21/21 92.5 kg (204 lb)   09/10/20 94.8 kg (209 lb)   10/24/19 95.7 kg (211 lb)   08/07/19 96.6 kg (213 lb)     Physical Exam:  Constitutional: Well-developed and well-nourished. Not diaphoretic. No distress.   Skin: Skin is warm and dry. No rash noted.  Head: Atraumatic without lesions.  Eyes: Conjunctivae and extraocular motions are normal. Pupils are equal, round, and reactive to light. No scleral icterus.   Ears:  External ears unremarkable. Tympanic membranes clear and intact.  Nose: Nares patent. Septum midline. Turbinates without erythema nor edema. No discharge.   Mouth/Throat: Tongue normal. Oropharynx is clear and moist. Posterior pharynx without erythema or exudates.  Neck: Supple, trachea midline. Normal range of motion. No " thyromegaly present. No lymphadenopathy--cervical or supraclavicular.  Cardiovascular: Regular rate and rhythm, S1 and S2 without murmur, rubs, or gallops.    Respiratory: Effort normal. Clear to auscultation throughout. No adventitious sounds.   Abdomen: Soft, non tender, and without distention. Active bowel sounds in all four quadrants.  Extremities: No cyanosis, clubbing, erythema, nor edema.  Radial pulses intact and symmetric.   Musculoskeletal: All major joints AROM full in all directions without pain.  Neurological: Alert and oriented x 3. Grossly non-focal. Strength and sensation grossly intact.   Psychiatric:  Behavior, mood, and affect are appropriate.    Assessment and Plan:   1. Encounter for well adult exam with abnormal findings    2. Acquired hypothyroidism  Chronic, uncontrolled.  Restart levothyroxine 150 mcg daily, printed prescription and good Rx card provided so that patient may find affordable pharmacy.  Plan to repeat TSH in March 2022.  - levothyroxine (SYNTHROID) 150 MCG Tab; Take 1 Tablet by mouth every morning on an empty stomach.  Dispense: 90 Tablet; Refill: 3  - TSH WITH REFLEX TO FT4; Future    3. Mixed hyperlipidemia  4. Coronary artery calcification seen on CAT scan  Chronic, ongoing without medication.  Previously following with cardiology, Dr. Schofield, requesting referral to return for follow-up to repeat CT cardiac scoring.  Encouraged diet high in fruits, vegetables, and fiber. And a diet low in salt, refined carbohydrates, cholesterol, saturated fat, and trans fatty acids. AVS education with recommendations for lowering cholesterol provided.  Encouraged a minimum of 30 minutes of moderate intensity aerobic exercise (eg, brisk walking) is recommended on five days each week. Or 30 minutes of vigorous-intensity aerobic exercise (eg, jogging) on three days each week.   Due for annual labs in September 2022.  - REFERRAL TO CARDIOLOGY    5. Obesity (BMI 30-39.9)  Chronic,  improving.  Encouraged diet high in fruits, vegetables, and fiber. And a diet low in salt, refined carbohydrates, cholesterol, saturated fat, and trans fatty acids.    Encouraged a minimum of 30 minutes of moderate intensity aerobic exercise (eg, brisk walking) is recommended on five days each week. Or 30 minutes of vigorous-intensity aerobic exercise (eg, jogging) on three days each week.   Patient's body mass index is 31.95 kg/m². Exercise and nutrition counseling were performed at this visit.  Due for annual labs in September 2022.    6. Low serum vitamin D  Chronic, ongoing. Reviewed foods high in vitamin d, AVS education with recommendations for increasing vitamin d provided. Due for annual labs in September 2022.    7. Elevated fasting glucose  Resolved.    8. Screening for colorectal cancer  Due for screening, last completed in 2010.  - REFERRAL TO GI FOR COLONOSCOPY    9. Encounter for screening mammogram for breast cancer  Due for screening.  - MA-SCREENING MAMMO BILAT W/TOMOSYNTHESIS W/CAD; Future    10. Routine health maintenance  Due for screening mammogram, colonoscopy, and pap smear.  - REFERRAL TO GI FOR COLONOSCOPY  - MA-SCREENING MAMMO BILAT W/TOMOSYNTHESIS W/CAD; Future  - REFERRAL TO GYNECOLOGY     Health maintenance: Up-to-date  Labs per orders  Immunizations: Up-to-date  Patient counseled about skin care, diet, supplements, and exercise.  Discussed  mammography screening, adequate intake of calcium and vitamin D, diet and exercise, colorectal cancer screening.     Follow-up: Return in about 1 year (around 9/21/2022) for Preventative Annual, As needed.     Please note that this dictation was created using voice recognition software. I have worked with consultants from the vendor as well as technical experts from [employer] to optimize the interface. I have made every reasonable attempt to correct obvious errors, but I expect that there are errors of grammar and possibly content that I did not  discover before finalizing the note.

## 2021-09-21 NOTE — ASSESSMENT & PLAN NOTE
Chronic, improving.  Reports that she remains active walking 6000-20588 steps daily.  Reports that she does not like to sit still and regularly lifts 130 pounds at a time.  She does monitor her blood pressure at home and reports that it is generally around 130 systolic.

## 2022-01-17 ENCOUNTER — OFFICE VISIT (OUTPATIENT)
Dept: CARDIOLOGY | Facility: MEDICAL CENTER | Age: 67
End: 2022-01-17
Payer: COMMERCIAL

## 2022-01-17 VITALS
DIASTOLIC BLOOD PRESSURE: 82 MMHG | RESPIRATION RATE: 14 BRPM | OXYGEN SATURATION: 98 % | BODY MASS INDEX: 31.39 KG/M2 | HEIGHT: 67 IN | HEART RATE: 56 BPM | SYSTOLIC BLOOD PRESSURE: 120 MMHG | WEIGHT: 200 LBS

## 2022-01-17 DIAGNOSIS — Z86.79 HISTORY OF HEART MURMUR IN CHILDHOOD: ICD-10-CM

## 2022-01-17 DIAGNOSIS — E78.2 MIXED HYPERLIPIDEMIA: ICD-10-CM

## 2022-01-17 DIAGNOSIS — Z82.49 FAMILY HISTORY OF CARDIOVASCULAR DISEASE: ICD-10-CM

## 2022-01-17 DIAGNOSIS — I25.10 CORONARY ARTERY CALCIFICATION SEEN ON CAT SCAN: ICD-10-CM

## 2022-01-17 PROCEDURE — 99214 OFFICE O/P EST MOD 30 MIN: CPT | Performed by: INTERNAL MEDICINE

## 2022-01-17 ASSESSMENT — ENCOUNTER SYMPTOMS
DIZZINESS: 0
SHORTNESS OF BREATH: 0
MYALGIAS: 0
LOSS OF CONSCIOUSNESS: 0
PALPITATIONS: 0
COUGH: 0

## 2022-01-17 ASSESSMENT — FIBROSIS 4 INDEX: FIB4 SCORE: 1.17

## 2022-01-17 NOTE — PROGRESS NOTES
Chief Complaint   Patient presents with   • Coronary Artery Disease     F/V Dx: Coronary artery calcification seen on CAT scan       Subjective     Freddy Smith is a 66 y.o. female who presents today for follow-up evaluation of coronary calcification, systolic heart murmur and dyslipidemia.    Since 9/10/2020 appointment the patient denies any cardiac problems or symptoms.  For various reasons and factors she had not been able to arrange for the coronary calcification scan.    The patient has a history of a heart murmur since birth but has had no further evaluation however it was thoroughly evaluated at the time of her induction into the [employer] with no apparent concerning findings.     The patient denies a history of hypertension, diabetes mellitus, hyperlipidemia or smoking history.     On 5/1/2019 the patient was seen in the emergency room for upper back pain of approximately 3 to 4 days after doing fairly strenuous labor.  Thoracoabdominal CTA was negative for aneurysm.  EKG was abnormal showing a previous MI. Troponin levels were normal.  Patient was discharged home.     The patient has significant family history of thoracic aneurysm in both her father and paternal grandmother and her mother suffered a heart attack in her 50s in addition to hypothyroidism.    Past Medical History:   Diagnosis Date   • Elevated fasting glucose 1/9/2017   • Hyperlipidemia    • Postmenopausal 2011   • Right foot pain 11/21/2018   • Unspecified disorder of thyroid    • Vitamin D deficiency      Past Surgical History:   Procedure Laterality Date   • VENTRAL HERNIA REPAIR  9/7/2011    Performed by SAVANAH QUIÑONES at SUR[facility]ERY [facility]   • PANNICULECTOMY  9/7/2011    Performed by SAVANAH QUIÑONES at SURGERY [facility]   • OTHER ABDOMINAL SURGERY  1990/2001/2006     incisional hernia repair     Family History   Problem Relation Age of Onset   • Heart Disease Mother    • Cancer Mother         breast   • Diabetes Mother    •  Heart Attack Mother    • Other Father         AAA   • No Known Problems Sister    • Alcohol/Drug Brother    • Alcohol/Drug Brother    • No Known Problems Sister    • Other Paternal Aunt         MS   • No Known Problems Maternal Grandmother    • No Known Problems Maternal Grandfather    • Heart Attack Paternal Grandmother         Thoracic Aneurysm   • Other Paternal Grandfather         TB     Social History     Socioeconomic History   • Marital status:      Spouse name: Not on file   • Number of children: Not on file   • Years of education: Not on file   • Highest education level: Not on file   Occupational History   • Not on file   Tobacco Use   • Smoking status: Never Smoker   • Smokeless tobacco: Never Used   Vaping Use   • Vaping Use: Never used   Substance and Sexual Activity   • Alcohol use: No   • Drug use: No   • Sexual activity: Yes     Partners: Male     Birth control/protection: Post-Menopausal     Comment:  40 years   Other Topics Concern   • Not on file   Social History Narrative   • Not on file     Social Determinants of Health     Financial Resource Strain:    • Difficulty of Paying Living Expenses: Not on file   Food Insecurity:    • Worried About Running Out of Food in the Last Year: Not on file   • Ran Out of Food in the Last Year: Not on file   Transportation Needs:    • Lack of Transportation (Medical): Not on file   • Lack of Transportation (Non-Medical): Not on file   Physical Activity:    • Days of Exercise per Week: Not on file   • Minutes of Exercise per Session: Not on file   Stress:    • Feeling of Stress : Not on file   Social Connections:    • Frequency of Communication with Friends and Family: Not on file   • Frequency of Social Gatherings with Friends and Family: Not on file   • Attends Yazidi Services: Not on file   • Active Member of Clubs or Organizations: Not on file   • Attends Club or Organization Meetings: Not on file   • Marital Status: Not on file   Intimate  "Partner Violence:    • Fear of Current or Ex-Partner: Not on file   • Emotionally Abused: Not on file   • Physically Abused: Not on file   • Sexually Abused: Not on file   Housing Stability:    • Unable to Pay for Housing in the Last Year: Not on file   • Number of Places Lived in the Last Year: Not on file   • Unstable Housing in the Last Year: Not on file     No Known Allergies  Outpatient Encounter Medications as of 1/17/2022   Medication Sig Dispense Refill   • levothyroxine (SYNTHROID) 150 MCG Tab Take 1 Tablet by mouth every morning on an empty stomach. 90 Tablet 3   • aspirin (ASA) 325 MG Tab Take 325 mg by mouth every 6 hours as needed.       No facility-administered encounter medications on file as of 1/17/2022.     Review of Systems   Respiratory: Negative for cough and shortness of breath.    Cardiovascular: Negative for chest pain and palpitations.   Musculoskeletal: Negative for myalgias.   Neurological: Negative for dizziness and loss of consciousness.              Objective     /82 (BP Location: Left arm, Patient Position: Sitting, BP Cuff Size: Adult)   Pulse (!) 56   Resp 14   Ht 1.702 m (5' 7\")   Wt 90.7 kg (200 lb)   LMP 07/25/2011   SpO2 98%   BMI 31.32 kg/m²     Physical Exam  Vitals reviewed.   Constitutional:       General: She is not in acute distress.     Appearance: She is well-developed.   Neck:      Vascular: No JVD.   Cardiovascular:      Rate and Rhythm: Normal rate and regular rhythm.      Heart sounds: Murmur heard.   No friction rub. No gallop.       Comments: Faint short systolic murmur.  Pulmonary:      Effort: Pulmonary effort is normal. No accessory muscle usage or respiratory distress.      Breath sounds: Normal breath sounds. No wheezing or rales.   Abdominal:      General: There is no abdominal bruit.      Palpations: There is no pulsatile mass.   Skin:     General: Skin is warm and dry.      Findings: No rash.      Nails: There is no clubbing.   Neurological:     "  Mental Status: She is alert and oriented to person, place, and time.      Coordination: Coordination normal.   Psychiatric:         Behavior: Behavior normal.            EKG 05/01/2019 normal sinus rhythm, rate 69.  Anterior septal MI, age-indeterminate.  Tracing personally reviewed.     CHEST/ABDOMINAL CTA 05/01/2019  1. No aortic aneurysm or dissection. No acute abnormality in the chest, abdomen or pelvis.  2. Incidental two closely adjacent 4 mm nodules in the right middle lobe. Follow-up guidelines for high and low risk patients are outlined below.  3. Suspected 1.4 cm nodule in the left thyroid lobe. Recommend follow-up with outpatient ultrasound.      Assessment & Plan     1. Coronary artery calcification seen on CAT scan  CT-CARDIAC SCORING   2. Mixed hyperlipidemia  CT-CARDIAC SCORING   3. History of heart murmur in childhood     4. Family history of cardiovascular disease         Medical Decision Making: Today's Assessment/Status/Plan:   Assessment  1.  Coronary artery disease manifest by the presence of coronary calcification on CT scan.  2.  Systolic murmur.  3.  Hypercholesterolemia, untreated.  4.  Family history of cardiovascular disease.  5.  Hypothyroidism.     Recommendation Discussion  1.  The patient is stable from a cardiac standpoint concerning her coronary calcification, systolic murmur and dyslipidemia.  2.  Will reorder coronary calcification scan.  Discussed potential treatment recommendations which he understood.  3.  Continues to follow a healthy dietary lifestyle.  4.  RTC 1 year.

## 2022-01-27 ENCOUNTER — OFFICE VISIT (OUTPATIENT)
Dept: MEDICAL GROUP | Facility: PHYSICIAN GROUP | Age: 67
End: 2022-01-27
Payer: COMMERCIAL

## 2022-01-27 VITALS
DIASTOLIC BLOOD PRESSURE: 78 MMHG | OXYGEN SATURATION: 97 % | BODY MASS INDEX: 30.77 KG/M2 | HEIGHT: 68 IN | WEIGHT: 203 LBS | HEART RATE: 76 BPM | TEMPERATURE: 97.4 F | SYSTOLIC BLOOD PRESSURE: 128 MMHG

## 2022-01-27 DIAGNOSIS — R11.2 NON-INTRACTABLE VOMITING WITH NAUSEA, UNSPECIFIED VOMITING TYPE: ICD-10-CM

## 2022-01-27 DIAGNOSIS — R42 VERTIGO: ICD-10-CM

## 2022-01-27 DIAGNOSIS — E03.9 ACQUIRED HYPOTHYROIDISM: ICD-10-CM

## 2022-01-27 PROCEDURE — 99214 OFFICE O/P EST MOD 30 MIN: CPT | Performed by: NURSE PRACTITIONER

## 2022-01-27 RX ORDER — ONDANSETRON 4 MG/1
4 TABLET, ORALLY DISINTEGRATING ORAL EVERY 6 HOURS PRN
Qty: 10 TABLET | Refills: 0 | Status: SHIPPED | OUTPATIENT
Start: 2022-01-27 | End: 2023-03-15

## 2022-01-27 RX ORDER — LEVOTHYROXINE SODIUM 0.15 MG/1
150 TABLET ORAL
Qty: 90 TABLET | Refills: 0 | Status: SHIPPED | OUTPATIENT
Start: 2022-01-27 | End: 2022-05-23

## 2022-01-27 RX ORDER — ONDANSETRON 2 MG/ML
4 INJECTION INTRAMUSCULAR; INTRAVENOUS ONCE
Status: COMPLETED | OUTPATIENT
Start: 2022-01-27 | End: 2022-01-27

## 2022-01-27 RX ORDER — DIAZEPAM 2 MG/1
2 TABLET ORAL EVERY 6 HOURS PRN
Qty: 16 TABLET | Refills: 0 | Status: SHIPPED | OUTPATIENT
Start: 2022-01-27 | End: 2022-01-31

## 2022-01-27 RX ORDER — FLUTICASONE PROPIONATE 50 MCG
1 SPRAY, SUSPENSION (ML) NASAL DAILY
Qty: 16 G | Refills: 0 | Status: SHIPPED | OUTPATIENT
Start: 2022-01-27 | End: 2023-03-15

## 2022-01-27 RX ORDER — MECLIZINE HYDROCHLORIDE 25 MG/1
25 TABLET ORAL 3 TIMES DAILY PRN
Qty: 30 TABLET | Refills: 0 | Status: SHIPPED | OUTPATIENT
Start: 2022-01-27 | End: 2023-03-15

## 2022-01-27 RX ADMIN — ONDANSETRON 4 MG: 2 INJECTION INTRAMUSCULAR; INTRAVENOUS at 14:38

## 2022-01-27 ASSESSMENT — PATIENT HEALTH QUESTIONNAIRE - PHQ9: CLINICAL INTERPRETATION OF PHQ2 SCORE: 0

## 2022-01-27 ASSESSMENT — FIBROSIS 4 INDEX: FIB4 SCORE: 1.17

## 2022-01-27 NOTE — ASSESSMENT & PLAN NOTE
New to examiner.  Patient reports that she has a vertigo spell every 1 to 2 years.  States that she has been seen by ENT in the past, Dr. Verduzco, for this issue.  Believes that the flares may be related to an allergen with warm weather changes, but symptoms are sporadic so she has not pin down the specific trigger.  She does report that it feels like her sinuses are building up with congestion/pressure, she is experiencing runny nose and postnasal drainage, and pressure building behind her right ear.  States that in the past, she will feel okay while lying in bed, but when she gets up she will get very dizzy, symptoms last for couple of minutes after standing and readjusting, and then symptoms improve and usually only last a couple of days.  Reports that this morning when she got out of bed symptoms started and they have lingered and not improved like they have in the past.  Denies ear pain, headache, fevers, chills, shortness of breath, cough.  Has not been seen and prescribed medication for this issue because generally symptoms have resolved without treatment.  States that current symptoms are severe causing nausea and vomiting and severe motion sickness.  She has not tried any medication for current episode.

## 2022-01-27 NOTE — PROGRESS NOTES
CC:   Chief Complaint   Patient presents with   • Vertigo     inner ear inballance     HISTORY OF THE PRESENT ILLNESS: Patient is a 66 y.o. female. This pleasant patient is here today with vertigo symptoms that started this morning.    Health Maintenance: Reviewed    Vertigo  New to examiner.  Patient reports that she has a vertigo spell every 1 to 2 years.  States that she has been seen by ENT in the past, Dr. Verduzco, for this issue.  Believes that the flares may be related to an allergen with warm weather changes, but symptoms are sporadic so she has not pin down the specific trigger.  She does report that it feels like her sinuses are building up with congestion/pressure, she is experiencing runny nose and postnasal drainage, and pressure building behind her right ear.  States that in the past, she will feel okay while lying in bed, but when she gets up she will get very dizzy, symptoms last for couple of minutes after standing and readjusting, and then symptoms improve and usually only last a couple of days.  Reports that this morning when she got out of bed symptoms started and they have lingered and not improved like they have in the past.  Denies ear pain, headache, fevers, chills, shortness of breath, cough.  Has not been seen and prescribed medication for this issue because generally symptoms have resolved without treatment.  States that current symptoms are severe causing nausea and vomiting and severe motion sickness.  She has not tried any medication for current episode.    Allergies: Patient has no known allergies.  Current Outpatient Medications Ordered in Epic   Medication Sig Dispense Refill   • meclizine (ANTIVERT) 25 MG Tab Take 1 Tablet by mouth 3 times a day as needed. 30 Tablet 0   • fluticasone (FLONASE) 50 MCG/ACT nasal spray Administer 1 Spray into affected nostril(S) every day. 16 g 0   • ondansetron (ZOFRAN ODT) 4 MG TABLET DISPERSIBLE Take 1 Tablet by mouth every 6 hours as needed for  "Nausea. 10 Tablet 0   • diazePAM (VALIUM) 2 MG Tab Take 1 Tablet by mouth every 6 hours as needed (vertigo) for up to 4 days. 16 Tablet 0   • levothyroxine (SYNTHROID) 150 MCG Tab Take 1 Tablet by mouth every morning on an empty stomach. 90 Tablet 0   • aspirin (ASA) 325 MG Tab Take 325 mg by mouth every 6 hours as needed.       No current Epic-ordered facility-administered medications on file.     Past Medical History:   Diagnosis Date   • Elevated fasting glucose 1/9/2017   • Hyperlipidemia    • Postmenopausal 2011   • Right foot pain 11/21/2018   • Unspecified disorder of thyroid    • Vitamin D deficiency      Past Surgical History:   Procedure Laterality Date   • VENTRAL HERNIA REPAIR  9/7/2011    Performed by SAVANAH QUIÑONES at SURGERY [facility]   • PANNICULECTOMY  9/7/2011    Performed by SAVANAH QUIÑONES at SURGERY [facility]   • OTHER ABDOMINAL SURGERY  1990/2001/2006     incisional hernia repair     Social History     Tobacco Use   • Smoking status: Never Smoker   • Smokeless tobacco: Never Used   Vaping Use   • Vaping Use: Never used   Substance Use Topics   • Alcohol use: No   • Drug use: No     Social History     Social History Narrative   • Not on file     Family History   Problem Relation Age of Onset   • Heart Disease Mother    • Cancer Mother         breast   • Diabetes Mother    • Heart Attack Mother    • Other Father         AAA   • No Known Problems Sister    • Alcohol/Drug Brother    • Alcohol/Drug Brother    • No Known Problems Sister    • Other Paternal Aunt         MS   • No Known Problems Maternal Grandmother    • No Known Problems Maternal Grandfather    • Heart Attack Paternal Grandmother         Thoracic Aneurysm   • Other Paternal Grandfather         TB     ROS:   See HPI      Exam: /78 (BP Location: Left arm, Patient Position: Sitting, BP Cuff Size: Adult)   Pulse 76   Temp 36.3 °C (97.4 °F) (Temporal)   Ht 1.715 m (5' 7.5\")   Wt 92.1 kg (203 lb)   SpO2 97%  Body mass " index is 31.33 kg/m².    General: Mild distress related to vertigo.  Normal appearing.  HEENT: Postnasal drainage, bulging right tympanic membrane without purulent effusion.  Normocephalic. Eyes conjunctiva clear lids without ptosis, pupils equal and reactive to light accommodation, ears normal shape and contour, canals are clear bilaterally, tympanic membranes are otherwise benign, nasal mucosa benign, oropharynx is without erythema, edema or exudates. Sinuses (frontal and maxillary) nontender to palpation.  Pulmonary: Clear to ausculation.  Normal effort. No rales, rhonchi, or wheezing.  Cardiovascular: Regular rate and rhythm without murmur. Carotid and radial pulses are intact and equal bilaterally.  Neurologic: Grossly nonfocal.  Skin: Warm and dry.  No obvious lesions.  Musculoskeletal: Normal gait. No extremity cyanosis, clubbing, or edema.  Psych: Normal mood and affect. Alert and oriented x3. Judgment and insight is normal.     Assessment/Plan:  1. Vertigo  2. Non-intractable vomiting with nausea, unspecified vomiting type  New to examiner, symptoms began this morning.  Zofran IM given in clinic.  Start meclizine 25 mg 3 times daily as needed for dizziness, if after 30 minutes symptoms do not improve, may take diazepam 2 mg every 6 hours as needed for dizziness.  Start fluticasone nasal spray once daily.  Zofran 4 mg every 6 hours as needed for nausea.  Increase fluid intake.  Rest.  Return to be seen if symptoms worsen or fail to improve.  - meclizine (ANTIVERT) 25 MG Tab; Take 1 Tablet by mouth 3 times a day as needed.  Dispense: 30 Tablet; Refill: 0  - fluticasone (FLONASE) 50 MCG/ACT nasal spray; Administer 1 Spray into affected nostril(S) every day.  Dispense: 16 g; Refill: 0  - ondansetron (ZOFRAN ODT) 4 MG TABLET DISPERSIBLE; Take 1 Tablet by mouth every 6 hours as needed for Nausea.  Dispense: 10 Tablet; Refill: 0  - ondansetron (ZOFRAN) syringe/vial injection 4 mg  - diazePAM (VALIUM) 2 MG Tab;  Take 1 Tablet by mouth every 6 hours as needed (vertigo) for up to 4 days.  Dispense: 16 Tablet; Refill: 0    3. Acquired hypothyroidism  Chronic, ongoing.  Patient requesting refill to be sent to new pharmacy for levothyroxine 150 mcg daily.  Due to repeat TSH in March 2022.  - levothyroxine (SYNTHROID) 150 MCG Tab; Take 1 Tablet by mouth every morning on an empty stomach.  Dispense: 90 Tablet; Refill: 0     Educated in proper administration of medication(s) ordered today including safety, possible SE, risks, benefits, rationale and alternatives to therapy.   Supportive care, differential diagnoses, and indications for immediate follow-up discussed with patient.    Pathogenesis of diagnosis discussed including typical length and natural progression.    Instructed to return to clinic or nearest emergency department for any change in condition, further concerns, or worsening of symptoms.  Patient states understanding of the plan of care and discharge instructions.    Return if symptoms worsen or fail to improve.    I have placed the below orders and discussed them with an approved delegating provider. The MA is performing the below orders under the direction of Dr. Mckeon.    Please note that this dictation was created using voice recognition software. I have made every reasonable attempt to correct obvious errors, but I expect that there are errors of grammar and possibly content that I did not discover before finalizing the note.

## 2022-05-23 DIAGNOSIS — E03.9 ACQUIRED HYPOTHYROIDISM: ICD-10-CM

## 2022-05-23 RX ORDER — LEVOTHYROXINE SODIUM 0.15 MG/1
TABLET ORAL
Qty: 90 TABLET | Refills: 0 | Status: SHIPPED | OUTPATIENT
Start: 2022-05-23 | End: 2023-03-15

## 2022-05-23 NOTE — TELEPHONE ENCOUNTER
Requested Prescriptions     Pending Prescriptions Disp Refills   • levothyroxine (SYNTHROID) 150 MCG Tab [Pharmacy Med Name: Levothyroxine Sodium 150 MCG Oral Tablet] 90 Tablet 0     Sig: TAKE 1 TABLET BY MOUTH IN THE MORNING ON AN EMPTY STOMACH       ERYN Sawant.

## 2022-06-28 ENCOUNTER — APPOINTMENT (RX ONLY)
Dept: URBAN - METROPOLITAN AREA CLINIC 6 | Facility: CLINIC | Age: 67
Setting detail: DERMATOLOGY
End: 2022-06-28

## 2022-06-28 DIAGNOSIS — L82.1 OTHER SEBORRHEIC KERATOSIS: ICD-10-CM

## 2022-06-28 DIAGNOSIS — D22 MELANOCYTIC NEVI: ICD-10-CM

## 2022-06-28 DIAGNOSIS — L82.0 INFLAMED SEBORRHEIC KERATOSIS: ICD-10-CM

## 2022-06-28 PROBLEM — D22.5 MELANOCYTIC NEVI OF TRUNK: Status: ACTIVE | Noted: 2022-06-28

## 2022-06-28 PROCEDURE — ? COUNSELING

## 2022-06-28 PROCEDURE — ? LIQUID NITROGEN

## 2022-06-28 PROCEDURE — 17110 DESTRUCTION B9 LES UP TO 14: CPT

## 2022-06-28 PROCEDURE — 99212 OFFICE O/P EST SF 10 MIN: CPT | Mod: 25

## 2022-06-28 ASSESSMENT — LOCATION DETAILED DESCRIPTION DERM
LOCATION DETAILED: RIGHT SUPERIOR UPPER BACK
LOCATION DETAILED: INFERIOR THORACIC SPINE
LOCATION DETAILED: LEFT SUPERIOR UPPER BACK

## 2022-06-28 ASSESSMENT — LOCATION SIMPLE DESCRIPTION DERM
LOCATION SIMPLE: UPPER BACK
LOCATION SIMPLE: LEFT UPPER BACK
LOCATION SIMPLE: RIGHT UPPER BACK

## 2022-06-28 ASSESSMENT — LOCATION ZONE DERM: LOCATION ZONE: TRUNK

## 2022-06-28 NOTE — PROCEDURE: LIQUID NITROGEN
Consent: The patient's consent was obtained including but not limited to risks of crusting, scabbing, blistering, scarring, darker or lighter pigmentary change, recurrence, incomplete removal and infection.
Spray Paint Text: The liquid nitrogen was applied to the skin utilizing a spray paint frosting technique.
Include Z78.9 (Other Specified Conditions Influencing Health Status) As An Associated Diagnosis?: No
Show Aperture Variable?: Yes
Number Of Freeze-Thaw Cycles: 3 freeze-thaw cycles
Duration Of Freeze Thaw-Cycle (Seconds): 10-15
Medical Necessity Clause: This procedure was medically necessary because the lesions that were treated were:
Detail Level: Detailed
Post-Care Instructions: I reviewed with the patient in detail post-care instructions. Patient is to wear sunprotection, and avoid picking at any of the treated lesions. Pt may apply Vaseline to crusted or scabbing areas.
Medical Necessity Information: It is in your best interest to select a reason for this procedure from the list below. All of these items fulfill various CMS LCD requirements except the new and changing color options.

## 2023-03-08 ENCOUNTER — OFFICE VISIT (OUTPATIENT)
Dept: URGENT CARE | Facility: PHYSICIAN GROUP | Age: 68
End: 2023-03-08
Payer: COMMERCIAL

## 2023-03-08 VITALS
HEIGHT: 64 IN | SYSTOLIC BLOOD PRESSURE: 120 MMHG | OXYGEN SATURATION: 97 % | RESPIRATION RATE: 16 BRPM | WEIGHT: 212.6 LBS | HEART RATE: 64 BPM | DIASTOLIC BLOOD PRESSURE: 84 MMHG | TEMPERATURE: 97.5 F | BODY MASS INDEX: 36.29 KG/M2

## 2023-03-08 DIAGNOSIS — Z82.49 FAMILY HISTORY OF AORTIC DISSECTION: ICD-10-CM

## 2023-03-08 DIAGNOSIS — R07.9 CHEST PAIN, UNSPECIFIED TYPE: ICD-10-CM

## 2023-03-08 DIAGNOSIS — Z82.49 FAMILY HISTORY OF CORONARY ARTERY DISEASE: ICD-10-CM

## 2023-03-08 LAB
APPEARANCE UR: NORMAL
BILIRUB UR STRIP-MCNC: NEGATIVE MG/DL
COLOR UR AUTO: NORMAL
GLUCOSE UR STRIP.AUTO-MCNC: NEGATIVE MG/DL
KETONES UR STRIP.AUTO-MCNC: NORMAL MG/DL
LEUKOCYTE ESTERASE UR QL STRIP.AUTO: NORMAL
NITRITE UR QL STRIP.AUTO: NEGATIVE
PH UR STRIP.AUTO: 5.5 [PH] (ref 5–8)
PROT UR QL STRIP: NEGATIVE MG/DL
RBC UR QL AUTO: NORMAL
SP GR UR STRIP.AUTO: >1.03
UROBILINOGEN UR STRIP-MCNC: 0.2 MG/DL

## 2023-03-08 PROCEDURE — 93000 ELECTROCARDIOGRAM COMPLETE: CPT | Performed by: PHYSICIAN ASSISTANT

## 2023-03-08 PROCEDURE — 99215 OFFICE O/P EST HI 40 MIN: CPT | Performed by: PHYSICIAN ASSISTANT

## 2023-03-08 PROCEDURE — 81002 URINALYSIS NONAUTO W/O SCOPE: CPT | Performed by: PHYSICIAN ASSISTANT

## 2023-03-08 ASSESSMENT — ENCOUNTER SYMPTOMS
CHILLS: 0
DIARRHEA: 0
ABDOMINAL PAIN: 0
VOMITING: 0
FEVER: 0
SHORTNESS OF BREATH: 0
PALPITATIONS: 0
NAUSEA: 0

## 2023-03-08 ASSESSMENT — FIBROSIS 4 INDEX: FIB4 SCORE: 1.19

## 2023-03-09 ENCOUNTER — HOSPITAL ENCOUNTER (EMERGENCY)
Facility: MEDICAL CENTER | Age: 68
End: 2023-03-09
Attending: EMERGENCY MEDICINE
Payer: COMMERCIAL

## 2023-03-09 ENCOUNTER — APPOINTMENT (OUTPATIENT)
Dept: RADIOLOGY | Facility: MEDICAL CENTER | Age: 68
End: 2023-03-09
Attending: EMERGENCY MEDICINE
Payer: COMMERCIAL

## 2023-03-09 VITALS
HEIGHT: 67 IN | HEART RATE: 61 BPM | DIASTOLIC BLOOD PRESSURE: 78 MMHG | OXYGEN SATURATION: 97 % | SYSTOLIC BLOOD PRESSURE: 160 MMHG | TEMPERATURE: 98.6 F | WEIGHT: 212 LBS | RESPIRATION RATE: 18 BRPM | BODY MASS INDEX: 33.27 KG/M2

## 2023-03-09 DIAGNOSIS — R10.11 RIGHT UPPER QUADRANT ABDOMINAL PAIN: ICD-10-CM

## 2023-03-09 DIAGNOSIS — R93.5 ABNORMAL ABDOMINAL CT SCAN: ICD-10-CM

## 2023-03-09 DIAGNOSIS — K65.4 SCLEROSING MESENTERITIS (HCC): ICD-10-CM

## 2023-03-09 DIAGNOSIS — N83.201 RIGHT OVARIAN CYST: ICD-10-CM

## 2023-03-09 LAB
ALBUMIN SERPL BCP-MCNC: 4.3 G/DL (ref 3.2–4.9)
ALBUMIN/GLOB SERPL: 1.6 G/DL
ALP SERPL-CCNC: 96 U/L (ref 30–99)
ALT SERPL-CCNC: 15 U/L (ref 2–50)
ANION GAP SERPL CALC-SCNC: 9 MMOL/L (ref 7–16)
AST SERPL-CCNC: 18 U/L (ref 12–45)
BASOPHILS # BLD AUTO: 0.6 % (ref 0–1.8)
BASOPHILS # BLD: 0.02 K/UL (ref 0–0.12)
BILIRUB SERPL-MCNC: 0.4 MG/DL (ref 0.1–1.5)
BUN SERPL-MCNC: 17 MG/DL (ref 8–22)
CALCIUM ALBUM COR SERPL-MCNC: 8.9 MG/DL (ref 8.5–10.5)
CALCIUM SERPL-MCNC: 9.1 MG/DL (ref 8.5–10.5)
CHLORIDE SERPL-SCNC: 106 MMOL/L (ref 96–112)
CO2 SERPL-SCNC: 25 MMOL/L (ref 20–33)
CREAT SERPL-MCNC: 0.67 MG/DL (ref 0.5–1.4)
EKG IMPRESSION: NORMAL
EOSINOPHIL # BLD AUTO: 0.08 K/UL (ref 0–0.51)
EOSINOPHIL NFR BLD: 2.4 % (ref 0–6.9)
ERYTHROCYTE [DISTWIDTH] IN BLOOD BY AUTOMATED COUNT: 41.5 FL (ref 35.9–50)
GFR SERPLBLD CREATININE-BSD FMLA CKD-EPI: 96 ML/MIN/1.73 M 2
GLOBULIN SER CALC-MCNC: 2.7 G/DL (ref 1.9–3.5)
GLUCOSE SERPL-MCNC: 103 MG/DL (ref 65–99)
HCT VFR BLD AUTO: 40.5 % (ref 37–47)
HGB BLD-MCNC: 14 G/DL (ref 12–16)
IMM GRANULOCYTES # BLD AUTO: 0.01 K/UL (ref 0–0.11)
IMM GRANULOCYTES NFR BLD AUTO: 0.3 % (ref 0–0.9)
LYMPHOCYTES # BLD AUTO: 0.63 K/UL (ref 1–4.8)
LYMPHOCYTES NFR BLD: 19.3 % (ref 22–41)
MCH RBC QN AUTO: 31.8 PG (ref 27–33)
MCHC RBC AUTO-ENTMCNC: 34.6 G/DL (ref 33.6–35)
MCV RBC AUTO: 92 FL (ref 81.4–97.8)
MONOCYTES # BLD AUTO: 0.28 K/UL (ref 0–0.85)
MONOCYTES NFR BLD AUTO: 8.6 % (ref 0–13.4)
NEUTROPHILS # BLD AUTO: 2.25 K/UL (ref 2–7.15)
NEUTROPHILS NFR BLD: 68.8 % (ref 44–72)
NRBC # BLD AUTO: 0 K/UL
NRBC BLD-RTO: 0 /100 WBC
PLATELET # BLD AUTO: 260 K/UL (ref 164–446)
PMV BLD AUTO: 9.5 FL (ref 9–12.9)
POTASSIUM SERPL-SCNC: 3.9 MMOL/L (ref 3.6–5.5)
PROT SERPL-MCNC: 7 G/DL (ref 6–8.2)
RBC # BLD AUTO: 4.4 M/UL (ref 4.2–5.4)
SODIUM SERPL-SCNC: 140 MMOL/L (ref 135–145)
TROPONIN T SERPL-MCNC: 12 NG/L (ref 6–19)
TROPONIN T SERPL-MCNC: 14 NG/L (ref 6–19)
WBC # BLD AUTO: 3.3 K/UL (ref 4.8–10.8)

## 2023-03-09 PROCEDURE — 99285 EMERGENCY DEPT VISIT HI MDM: CPT

## 2023-03-09 PROCEDURE — 80053 COMPREHEN METABOLIC PANEL: CPT

## 2023-03-09 PROCEDURE — 93005 ELECTROCARDIOGRAM TRACING: CPT | Performed by: EMERGENCY MEDICINE

## 2023-03-09 PROCEDURE — 76856 US EXAM PELVIC COMPLETE: CPT

## 2023-03-09 PROCEDURE — 74176 CT ABD & PELVIS W/O CONTRAST: CPT

## 2023-03-09 PROCEDURE — 85025 COMPLETE CBC W/AUTO DIFF WBC: CPT

## 2023-03-09 PROCEDURE — 36415 COLL VENOUS BLD VENIPUNCTURE: CPT

## 2023-03-09 PROCEDURE — 84484 ASSAY OF TROPONIN QUANT: CPT | Mod: 91

## 2023-03-09 PROCEDURE — 71045 X-RAY EXAM CHEST 1 VIEW: CPT

## 2023-03-09 PROCEDURE — 93005 ELECTROCARDIOGRAM TRACING: CPT

## 2023-03-09 ASSESSMENT — FIBROSIS 4 INDEX: FIB4 SCORE: 1.19

## 2023-03-09 NOTE — ED TRIAGE NOTES
"Chief Complaint   Patient presents with    Chest Pain     Pt states she has been having right sided chest pain that radiates to her back x 1 day. States it feels similar to indigestion. Denies SOB or N/V     66 yo F to triage for above complaint. Pt denies hx of HTN, states she took one of her 's nitro tablets without change in symptoms. Bilateral BP taken in triage. <20 point difference in arms, pt denies ripping/tearing sensation in chest. Protocol ordered.      Pt placed in lobby. Pt educated on triage process. Pt encouraged to alert staff for any changes.     Patient and staff wearing appropriate PPE    BP (!) 195/100   Pulse 82   Temp (!) 35.7 °C (96.3 °F) (Temporal)   Resp 16   Ht 1.702 m (5' 7\")   Wt 96.2 kg (212 lb)   LMP 07/25/2011   SpO2 96%   BMI 33.20 kg/m²     "

## 2023-03-09 NOTE — ED NOTES
Pt. Updated on poc.  Pt. Requesting water, explained need to remain NPO until tests are completed and resulted to pt.  Pt. Verbalize understanding.   Denies further needs.

## 2023-03-09 NOTE — ED NOTES
Pt ambulated to Red 10 without difficulty, gate steady, monitor connected. Pt declined starting a PIV at this time.

## 2023-03-09 NOTE — ED NOTES
Received report from LUZ Toussaint to assume care of pt. At 0710.  Pt. Resting on gurney with no distress noted. Blood sample collected and sent to lab.  Pt. Denies needs.  Call light in reach.  All safety measures observed.

## 2023-03-09 NOTE — ED PROVIDER NOTES
ER Provider Note    Scribed for Roddy Cardenas M.d. by Ani Lara. 3/9/2023  6:08 AM    Primary Care Provider: LUIS A Sawant    CHIEF COMPLAINT  Chief Complaint   Patient presents with    Chest Pain     Pt states she has been having right sided chest pain that radiates to her back x 1 day. States it feels similar to indigestion. Denies SOB or N/V     EXTERNAL RECORDS REVIEWED  Outpatient Notes      HPI/ROS  LIMITATION TO HISTORY   Select: : None  OUTSIDE HISTORIAN(S):  none    Freddy Smith is a 67 y.o. female who presents to the ED complaining of chest pain onset yesterday. She describes her pain felt like gas pains initially and took gas-x but did not have any alleviation. She presented to urgent care yesterday and they advised her to come to the ED for evaluation. Patient locates her pain to the right side of her upper abdomen/ chest wall with radiation into her back. Denies fevers, shortness of breath, or vomiting. She has a history of hernia repair, but denies cholecystectomy or appendicitis.     PAST MEDICAL HISTORY  Past Medical History:   Diagnosis Date    Elevated fasting glucose 1/9/2017    Hyperlipidemia     Postmenopausal 2011    Right foot pain 11/21/2018    Unspecified disorder of thyroid     Vitamin D deficiency        SURGICAL HISTORY  Past Surgical History:   Procedure Laterality Date    VENTRAL HERNIA REPAIR  9/7/2011    Performed by SAVANAH QUIÑONES at SURGERY [facility]    PANNICULECTOMY  9/7/2011    Performed by SAVANAH QUIÑONES at SURGERY [facility]S    OTHER ABDOMINAL SURGERY  1990/2001/2006     incisional hernia repair       FAMILY HISTORY  Family History   Problem Relation Age of Onset    Heart Disease Mother     Cancer Mother         breast    Diabetes Mother     Heart Attack Mother     Other Father         AAA    No Known Problems Sister     Alcohol/Drug Brother     Alcohol/Drug Brother     No Known Problems Sister     Other Paternal Aunt         MS    No  "Known Problems Maternal Grandmother     No Known Problems Maternal Grandfather     Heart Attack Paternal Grandmother         Thoracic Aneurysm    Other Paternal Grandfather         TB       SOCIAL HISTORY   reports that she has never smoked. She has never used smokeless tobacco. She reports that she does not drink alcohol and does not use drugs.    CURRENT MEDICATIONS  Previous Medications    ASPIRIN (ASA) 325 MG TAB    Take 325 mg by mouth every 6 hours as needed.    FLUTICASONE (FLONASE) 50 MCG/ACT NASAL SPRAY    Administer 1 Spray into affected nostril(S) every day.    LEVOTHYROXINE (SYNTHROID) 150 MCG TAB    TAKE 1 TABLET BY MOUTH IN THE MORNING ON AN EMPTY STOMACH    MECLIZINE (ANTIVERT) 25 MG TAB    Take 1 Tablet by mouth 3 times a day as needed.    ONDANSETRON (ZOFRAN ODT) 4 MG TABLET DISPERSIBLE    Take 1 Tablet by mouth every 6 hours as needed for Nausea.       ALLERGIES  Patient has no known allergies.    PHYSICAL EXAM  BP (!) 161/90   Pulse 82   Temp (!) 35.7 °C (96.3 °F) (Temporal)   Resp 16   Ht 1.702 m (5' 7\")   Wt 96.2 kg (212 lb)   LMP 07/25/2011   SpO2 96%   BMI 33.20 kg/m²   Nursing note and vitals reviewed.  Constitutional: Well-developed and well-nourished. No distress.   HENT: Head is normocephalic and atraumatic. Oropharynx is clear and moist without exudate or erythema.   Eyes: Pupils are equal, round, and reactive to light. Conjunctiva are normal.   Cardiovascular: Normal rate and regular rhythm. No murmur heard. Normal radial pulses.   Pulmonary/Chest: Breath sounds normal. No wheezes or rales. No chest wall tenderness.   Abdominal: Soft. Mild tenderness right mid flank, no CVA tenderness, negative mead sign. No distention   Musculoskeletal: Extremities exhibit normal range of motion without edema or tenderness. No calf tenderness or palpable cords.   Neurological: Awake, alert and oriented to person, place, and time. No focal deficits noted.  Skin: Skin is warm and dry. "   Psychiatric: Normal mood and affect. Appropriate for clinical situation    DIAGNOSTIC STUDIES    Labs:   Results for orders placed or performed during the hospital encounter of 03/09/23   CBC with Differential   Result Value Ref Range    WBC 3.3 (L) 4.8 - 10.8 K/uL    RBC 4.40 4.20 - 5.40 M/uL    Hemoglobin 14.0 12.0 - 16.0 g/dL    Hematocrit 40.5 37.0 - 47.0 %    MCV 92.0 81.4 - 97.8 fL    MCH 31.8 27.0 - 33.0 pg    MCHC 34.6 33.6 - 35.0 g/dL    RDW 41.5 35.9 - 50.0 fL    Platelet Count 260 164 - 446 K/uL    MPV 9.5 9.0 - 12.9 fL    Neutrophils-Polys 68.80 44.00 - 72.00 %    Lymphocytes 19.30 (L) 22.00 - 41.00 %    Monocytes 8.60 0.00 - 13.40 %    Eosinophils 2.40 0.00 - 6.90 %    Basophils 0.60 0.00 - 1.80 %    Immature Granulocytes 0.30 0.00 - 0.90 %    Nucleated RBC 0.00 /100 WBC    Neutrophils (Absolute) 2.25 2.00 - 7.15 K/uL    Lymphs (Absolute) 0.63 (L) 1.00 - 4.80 K/uL    Monos (Absolute) 0.28 0.00 - 0.85 K/uL    Eos (Absolute) 0.08 0.00 - 0.51 K/uL    Baso (Absolute) 0.02 0.00 - 0.12 K/uL    Immature Granulocytes (abs) 0.01 0.00 - 0.11 K/uL    NRBC (Absolute) 0.00 K/uL   Complete Metabolic Panel (CMP)   Result Value Ref Range    Sodium 140 135 - 145 mmol/L    Potassium 3.9 3.6 - 5.5 mmol/L    Chloride 106 96 - 112 mmol/L    Co2 25 20 - 33 mmol/L    Anion Gap 9.0 7.0 - 16.0    Glucose 103 (H) 65 - 99 mg/dL    Bun 17 8 - 22 mg/dL    Creatinine 0.67 0.50 - 1.40 mg/dL    Calcium 9.1 8.5 - 10.5 mg/dL    AST(SGOT) 18 12 - 45 U/L    ALT(SGPT) 15 2 - 50 U/L    Alkaline Phosphatase 96 30 - 99 U/L    Total Bilirubin 0.4 0.1 - 1.5 mg/dL    Albumin 4.3 3.2 - 4.9 g/dL    Total Protein 7.0 6.0 - 8.2 g/dL    Globulin 2.7 1.9 - 3.5 g/dL    A-G Ratio 1.6 g/dL   Troponins NOW   Result Value Ref Range    Troponin T 12 6 - 19 ng/L   Troponins in two (2) hours   Result Value Ref Range    Troponin T 14 6 - 19 ng/L   CORRECTED CALCIUM   Result Value Ref Range    Correct Calcium 8.9 8.5 - 10.5 mg/dL   ESTIMATED GFR   Result  Value Ref Range    GFR (CKD-EPI) 96 >60 mL/min/1.73 m 2   EKG   Result Value Ref Range    Report       [facility] Emergency Dept.    Test Date:  2023  Pt Name:    CAITY SOLIS                Department: ER  MRN:        [id number]                      Room:       RD 10  Gender:     Female                       Technician: [id number]  :        [id number]                   Requested By:ER TRIAGE PROTOCOL  Order #:    [id number]                    Reading MD: MARY JANE CÁRDENAS MD    Measurements  Intervals                                Axis  Rate:       61                           P:          53  KS:         169                          QRS:        -13  QRSD:       107                          T:          -57  QT:         431  QTc:        434    Interpretive Statements  Sinus rhythm  Anteroseptal infarct, old  Nonspecific T abnormalities, lateral leads  Compared to ECG 2019 21:04:12  T-wave abnormality now present  Myocardial infarct finding still present  Electronically Signed On 3-9-2023 6:07:57 PST by MARY JANE CÁRDENAS MD          EKG:   I have independently interpreted this EKG as above     Radiology:   The attending emergency physician has independently interpreted the diagnostic imaging associated with this visit and am waiting the final reading from the radiologist.   Preliminary interpretation is a follows: Right ovarian cyst noted on CT scan.  Ultrasound shows the same.    Radiologist interpretation:   US-PELVIC COMPLETE (TRANSABDOMINAL/TRANSVAGINAL) (COMBO)   Final Result      1.  8.4 cm right ovarian cyst. Recommend GYN consult and follow-up ultrasound in one month.      CT-RENAL COLIC EVALUATION(A/P W/O)   Final Result         1.  Bilateral nephrolithiasis without visualized obstructive changes   2.  Large right ovarian cyst, recommend further evaluation with pelvic sonogram.   3.  Hazy fat stranding of the mesenteric root, appears similar to prior study. Can be associated with  ventriculitis or sclerosing mesenteritis in the appropriate clinical setting.   4.  Diverticulosis   5.  Atherosclerosis      DX-CHEST-PORTABLE (1 VIEW)   Final Result         1.  No acute cardiopulmonary disease.   2.  Atherosclerosis           COURSE & MEDICAL DECISION MAKING     ED Observation Status? Yes; I am placing the patient in to an observation status due to a diagnostic uncertainty as well as therapeutic intensity. Patient placed in observation status at 6:29 AM, 3/9/2023.     Observation plan is as follows: laboratory studies, chest x-ray, and CT.    Upon Reevaluation, the patient's condition has: Improved; and will be discharged.    Patient discharged from ED Observation status at 1049 (Time) 3/9/2023  (Date).     INITIAL ASSESSMENT, COURSE AND PLAN  6:08 AM - Patient seen and examined at bedside. Patient symptoms are reported as chest pain however on exam the patient seems to localize her pain to the right flank. She has cardiac concerns because of her husbands history of a heart attack, however I feel this is most consistent with an abdominal etiology for pain as opposed to ACS. Discussed plan of care, including labs and imaging. Patient agrees to the plan of care. Ordered for CT renal colic, chest x-ray, troponin, CBC w/ diff, CMP, and EKG to evaluate her symptoms.    8:39 AM - Reviewed the patient's lab and imaging results. CT was remarkable for a right ovarian cyst. Ordered US pelvis.    9:56 AM - CT scans noted included possible sclerosing mesenteritis. Will refer to GI.    10:36 AM - Patient was reevaluated at bedside. Discussed lab and radiology results with the patient and informed them that she has an ovarian cyst. Patient does not have a gynecologist that she follows with. Will consult gynecology for follow up. Discussed possible findings of sclerosing mesenteritis and informed her of the need for follow up with GI. Recommended treatment with ibuprofen for pain.    10:45 AM I discussed the  patient's case and the above findings with Dr. New (gynecology) who will follow up with her as an outpatient.     DISPOSITION AND DISCUSSIONS  I have discussed management of the patient with the following physicians and [place]'s: Dr. New (gynecology)    Discussion of management with other [facility] or appropriate source(s): None     Escalation of care considered, and ultimately not performed: acute inpatient care management, however at this time, the patient is most appropriate for outpatient management.    Barriers to care at this time, including but not limited to:  none .     The patient will return for new or worsening symptoms and is stable at the time of discharge.    DISPOSITION:  Patient will be discharged home in stable condition.    FOLLOW UP:  Maryann Alexander A.P.R.N.  [address]  [place] [id number] [phone]    Schedule an appointment as soon as possible for a visit       [facility], Emergency Dept  [address] [address] [phone]    If symptoms worsen    Lamine New M.D.  [address]  [address]  [facility] [id number] [phone]    Schedule an appointment as soon as possible for a visit   gynecologist, regarding ovarian cyst    Haydee White M.D.  [address] [facility]  [address]  [place] [address] [phone]    Schedule an appointment as soon as possible for a visit   gastroenterologist. Please review findings on CT scan.      FINAL DIANGOSIS  1. Right upper quadrant abdominal pain    2. Right ovarian cyst    3. Abnormal abdominal CT scan          Ani BLOOD (Uli), am scribing for, and in the presence of, Roddy Cardenas M.D..    Electronically signed by: Ani Lara (Uli), 3/9/2023    Roddy BLOOD M.D. personally performed the services described in this documentation, as scribed by Ani Lara in my presence, and it is both accurate and complete.   The note accurately reflects work and decisions made by me.  Roddy BILLS  JAQUELINE Cardenas  3/9/2023  1:21 PM

## 2023-03-09 NOTE — ED NOTES
Pt. Able to fully dress self and ambulate out of ed with steady gait.  Pt. Verbalized understanding of d/c instructions.

## 2023-03-10 ENCOUNTER — TELEPHONE (OUTPATIENT)
Dept: CARDIOLOGY | Facility: MEDICAL CENTER | Age: 68
End: 2023-03-10
Payer: COMMERCIAL

## 2023-03-10 DIAGNOSIS — E78.2 MIXED HYPERLIPIDEMIA: Primary | ICD-10-CM

## 2023-03-10 DIAGNOSIS — I25.10 CORONARY ARTERY CALCIFICATION SEEN ON CAT SCAN: ICD-10-CM

## 2023-03-10 DIAGNOSIS — Z82.49 FAMILY HISTORY OF CARDIOVASCULAR DISEASE: ICD-10-CM

## 2023-03-10 DIAGNOSIS — E66.9 OBESITY (BMI 30-39.9): ICD-10-CM

## 2023-03-10 NOTE — TELEPHONE ENCOUNTER
----- Message from Lisseth Mathis, Med Ass't sent at 3/10/2023  2:40 PM PST -----  Regarding: PT Needs Labs Reordered  Vanessa Medellin,    This PT just came in to schedule an apt with DR SCALES and she stated that she never completed the requested labs from [facility] on 1.17.23.    It looks like he had previously ordered a CT-Cardiac Scoring possibly.       She was hoping Dr SCALES would reorder them so she can complete them prior to her upcoming apt on 6.1.23.    Thanks for the help!

## 2023-03-10 NOTE — TELEPHONE ENCOUNTER
Reviewed last office note with LOYDA 1/17/22, noted to re-order CT cardiac score, last LP done 2021, otherwise several other labs completed with other providers.   S/W pt, informed will place order for fasting LP and CT cardiac score, gave number for imaging scheduling. Nothing further needed.

## 2023-03-13 ENCOUNTER — HOSPITAL ENCOUNTER (OUTPATIENT)
Dept: RADIOLOGY | Facility: MEDICAL CENTER | Age: 68
End: 2023-03-13
Attending: INTERNAL MEDICINE
Payer: COMMERCIAL

## 2023-03-13 DIAGNOSIS — I25.10 CORONARY ARTERY CALCIFICATION SEEN ON CAT SCAN: ICD-10-CM

## 2023-03-13 DIAGNOSIS — Z82.49 FAMILY HISTORY OF CARDIOVASCULAR DISEASE: ICD-10-CM

## 2023-03-13 DIAGNOSIS — E78.2 MIXED HYPERLIPIDEMIA: ICD-10-CM

## 2023-03-13 DIAGNOSIS — E66.9 OBESITY (BMI 30-39.9): ICD-10-CM

## 2023-03-13 PROCEDURE — 4410556 CT-CARDIAC SCORING (SELF PAY ONLY)

## 2023-03-14 ENCOUNTER — TELEPHONE (OUTPATIENT)
Dept: CARDIOLOGY | Facility: MEDICAL CENTER | Age: 68
End: 2023-03-14
Payer: COMMERCIAL

## 2023-03-14 DIAGNOSIS — E78.2 MIXED HYPERLIPIDEMIA: ICD-10-CM

## 2023-03-14 RX ORDER — ROSUVASTATIN CALCIUM 10 MG/1
10 TABLET, COATED ORAL EVERY EVENING
Qty: 90 TABLET | Refills: 3 | Status: SHIPPED | OUTPATIENT
Start: 2023-03-14 | End: 2023-03-15

## 2023-03-14 NOTE — TELEPHONE ENCOUNTER
Phone Number Called: [phone]    Call outcome: Spoke to patient regarding message below.    Message: Called to relay SW results and recommendations. She verbalized understanding and confirmed pharmacy, advised RX already ordered. Labs to complete prior to [facility]. Answered all questions and concerns, appreciative of call.     Lab printed and mailed to the patient.

## 2023-03-14 NOTE — TELEPHONE ENCOUNTER
----- Message from Ulises Schofield M.D. sent at 3/14/2023 11:58 AM PDT -----  Please notify Freddy that I have reviewed your calcium score which showed a calcium score of 230.  This shows atherosclerosis plaque development of her coronary arteries  Are cholesterol has been consistently elevated most.  I would recommend starting a low-dose statin of rosuvastatin initially at 10 mg  After starting rosuvastatin I have ordered a follow-up lipid panel to be done prior to her next appointment with me in 6/2023

## 2023-03-14 NOTE — RESULT ENCOUNTER NOTE
Please notify Freddy that I have reviewed your calcium score which showed a calcium score of 230.  This shows atherosclerosis plaque development of her coronary arteries  Are cholesterol has been consistently elevated most.  I would recommend starting a low-dose statin of rosuvastatin initially at 10 mg  After starting rosuvastatin I have ordered a follow-up lipid panel to be done prior to her next appointment with me in 6/2023

## 2023-03-15 ENCOUNTER — OFFICE VISIT (OUTPATIENT)
Dept: MEDICAL GROUP | Facility: PHYSICIAN GROUP | Age: 68
End: 2023-03-15
Payer: COMMERCIAL

## 2023-03-15 VITALS
WEIGHT: 210 LBS | DIASTOLIC BLOOD PRESSURE: 78 MMHG | BODY MASS INDEX: 32.96 KG/M2 | SYSTOLIC BLOOD PRESSURE: 138 MMHG | RESPIRATION RATE: 16 BRPM | HEIGHT: 67 IN | OXYGEN SATURATION: 97 % | HEART RATE: 66 BPM | TEMPERATURE: 98 F

## 2023-03-15 DIAGNOSIS — E55.9 VITAMIN D DEFICIENCY: ICD-10-CM

## 2023-03-15 DIAGNOSIS — Z23 NEED FOR VACCINATION: ICD-10-CM

## 2023-03-15 DIAGNOSIS — Z78.0 POSTMENOPAUSAL: ICD-10-CM

## 2023-03-15 DIAGNOSIS — Z12.31 ENCOUNTER FOR SCREENING MAMMOGRAM FOR BREAST CANCER: ICD-10-CM

## 2023-03-15 DIAGNOSIS — E03.9 ACQUIRED HYPOTHYROIDISM: ICD-10-CM

## 2023-03-15 DIAGNOSIS — Z09 HOSPITAL DISCHARGE FOLLOW-UP: ICD-10-CM

## 2023-03-15 PROCEDURE — 90471 IMMUNIZATION ADMIN: CPT | Performed by: NURSE PRACTITIONER

## 2023-03-15 PROCEDURE — 90677 PCV20 VACCINE IM: CPT | Performed by: NURSE PRACTITIONER

## 2023-03-15 PROCEDURE — 99214 OFFICE O/P EST MOD 30 MIN: CPT | Mod: 25 | Performed by: NURSE PRACTITIONER

## 2023-03-15 ASSESSMENT — PATIENT HEALTH QUESTIONNAIRE - PHQ9: CLINICAL INTERPRETATION OF PHQ2 SCORE: 0

## 2023-03-15 ASSESSMENT — FIBROSIS 4 INDEX: FIB4 SCORE: 1.161895003862225066

## 2023-03-15 NOTE — ASSESSMENT & PLAN NOTE
New to examiner.  The patient was seen in the emergency room on 3/9/2023 for right-sided chest pain that radiated to her back for 1 day.  Symptoms were similar to indigestion, she denies shortness of breath, nausea, vomiting.  Initially, the pain felt like gas and she took Gas-X, but did not have any improvement.  She went to the urgent care who advised her to be seen in the emergency room.  She had an EKG completed that revealed sinus rhythm, old anteroseptal infarct, pelvic ultrasound that showed an 8.4 cm right ovarian cyst, it is recommended for patient to follow-up with gynecology.  She had a renal colic CT that showed bilateral nephro lithiasis without visualized obstructive changes, large right ovarian cyst, hazy fat stranding of the mesenteric root which is unchanged to previous study, diverticulosis, and atherosclerosis.  Chest x-ray showed atherosclerosis, no acute cardiopulmonary disease.    Patient reports that she has followed a mostly vegan diet for the last year, she thinks that the gas pain may be related to the vegan diet.  She does have a history of a right ovarian cyst with her third pregnancy about 35 years ago.  States that she had surgery to remove the cyst while 6 months pregnant and had to have it repaired 4 times due to sneezing, finally had a tummy tuck with Dr. King and has not had issues since.    She has tried to reach out to her gastroenterologist to schedule a follow-up.  She was given a referral to [facility], plans to call and schedule an appointment to discuss the ovarian cyst.  She will follow-up with cardiology on 6/1/2023.

## 2023-03-16 NOTE — PROGRESS NOTES
CC: Diagnoses of Hospital discharge follow-up, Acquired hypothyroidism, Vitamin D deficiency, Encounter for screening mammogram for breast cancer, Postmenopausal, and Need for vaccination were pertinent to this visit.                                                                                                                                       HPI:   Freddy presents today with the following concerns:    Hospital discharge follow-up  New to examiner.  The patient was seen in the emergency room on 3/9/2023 for right-sided chest pain that radiated to her back for 1 day.  Symptoms were similar to indigestion, she denies shortness of breath, nausea, vomiting.  Initially, the pain felt like gas and she took Gas-X, but did not have any improvement.  She went to the urgent care who advised her to be seen in the emergency room.  She had an EKG completed that revealed sinus rhythm, old anteroseptal infarct, pelvic ultrasound that showed an 8.4 cm right ovarian cyst, it is recommended for patient to follow-up with gynecology.  She had a renal colic CT that showed bilateral nephro lithiasis without visualized obstructive changes, large right ovarian cyst, hazy fat stranding of the mesenteric root which is unchanged to previous study, diverticulosis, and atherosclerosis.  Chest x-ray showed atherosclerosis, no acute cardiopulmonary disease.    Patient reports that she has followed a mostly vegan diet for the last year, she thinks that the gas pain may be related to the vegan diet.  She does have a history of a right ovarian cyst with her third pregnancy about 35 years ago.  States that she had surgery to remove the cyst while 6 months pregnant and had to have it repaired 4 times due to sneezing, finally had a tummy tuck with Dr. King and has not had issues since.    She has tried to reach out to her gastroenterologist to schedule a follow-up.  She was given a referral to [facility], plans to call and schedule  "an appointment to discuss the ovarian cyst.  She will follow-up with cardiology on 6/1/2023.    Patient Active Problem List    Diagnosis Date Noted    Hospital discharge follow-up 03/15/2023    Vertigo 01/27/2022    Family history of cardiovascular disease 01/17/2022    History of heart murmur in childhood 09/10/2020    Thyroid nodule 10/24/2019    Lung nodule 10/24/2019    Coronary artery calcification seen on CAT scan 08/07/2019    Mixed hyperlipidemia 08/07/2019    Obesity (BMI 30-39.9) 01/09/2017    Vitamin D deficiency 01/09/2017    Generalized osteoarthritis 05/01/2014    Acquired hypothyroidism 05/01/2014     Current Outpatient Medications   Medication Sig Dispense Refill    CINNAMON PO Take  by mouth.      NON SPECIFIED tumeric      GUGGULIPID-BLACK PEPPER PO Take  by mouth.       No current facility-administered medications for this visit.     Allergies as of 03/15/2023    (No Known Allergies)      ROS:  See HPI    /78 (BP Location: Left arm, Patient Position: Sitting, BP Cuff Size: Large adult)   Pulse 66   Temp 36.7 °C (98 °F) (Temporal)   Resp 16   Ht 1.702 m (5' 7\")   Wt 95.3 kg (210 lb)   LMP 07/25/2011   SpO2 97%   BMI 32.89 kg/m²     Physical Exam:  General: Normal appearing. No distress.  HEENT: Normocephalic. Eyes conjunctiva clear lids without ptosis, pupils equal and reactive to light accommodation.  Pulmonary: Clear to ausculation.  Normal effort. No rales, rhonchi, or wheezing.  Cardiovascular: Regular rate and rhythm without murmur.   Abdomen: Soft, nontender, nondistended. Normal bowel sounds.   Neurologic: Grossly nonfocal.  Skin: Warm and dry.  No obvious lesions.  Musculoskeletal: Normal gait. No extremity cyanosis, clubbing, or edema.  Psych: Normal mood and affect. Alert and oriented x3. Judgment and insight is normal.     Assessment and Plan.   67 y.o. female with the following issues:  1. Hospital discharge follow-up  New to examiner.  Patient is following up after " hospital evaluation for right-sided chest pain that radiated to her back, was determined likely gas pains.  Incidentally discovered right ovarian cyst, she has been referred to gynecology and will be scheduling for evaluation.  She is also scheduling an appointment with her gastroenterologist for follow-up, due for colonoscopy as well.  Scheduled to follow-up with cardiology on 6/1/2023, will be completing a fasting lipid profile prior to that appointment, completed cardiac CT scoring scan yesterday.  I have reviewed all hospital records including lab results, progress notes, and admission and discharge summaries. I have discussed with patient possible factors that contributed to hospitalization and how we can prevent patient from ending up in the hospital again. Educated patient on s/sx to observe for and what action to take when these occur. Reviewed current medications and educated on importance of compliance with these medications all appropriate follow-up visits are scheduled.      2. Acquired hypothyroidism  Chronic, uncontrolled, no longer taking medication for this issue.  Due for updated labs, will notify her of results through [facility] when received.  - TSH WITH REFLEX TO FT4; Future    3. Vitamin D deficiency  Chronic, ongoing without supplementation.  Due for updated labs, will notify her of results through [facility] when received.  - VITAMIN D,25 HYDROXY (DEFICIENCY); Future    4. Encounter for screening mammogram for breast cancer  Due for screening.  - MA-SCREENING MAMMO BILAT W/TOMOSYNTHESIS W/CAD; Future    5. Postmenopausal  Due for screening.  - DS-BONE DENSITY STUDY (DEXA); Future    6. Need for vaccination  Given today.  - Pneumococcal Conjugate Vaccine 20-Valent (19 yrs+)     I have placed the below orders and discussed them with an approved delegating provider. The MA is performing the below orders under the direction of Dr. Carbajal.      Return in about 6 months (around 9/15/2023) for  Preventative Annual, As needed.     Please note that this dictation was created using voice recognition software. I have worked with consultants from the vendor as well as technical experts from [facility] to optimize the interface. I have made every reasonable attempt to correct obvious errors, but I expect that there are errors of grammar and possibly content that I did not discover before finalizing the note.

## 2023-04-21 ENCOUNTER — HOSPITAL ENCOUNTER (OUTPATIENT)
Dept: RADIOLOGY | Facility: MEDICAL CENTER | Age: 68
End: 2023-04-21
Attending: NURSE PRACTITIONER
Payer: COMMERCIAL

## 2023-04-21 DIAGNOSIS — Z78.0 POSTMENOPAUSAL: ICD-10-CM

## 2023-04-21 DIAGNOSIS — Z12.31 ENCOUNTER FOR SCREENING MAMMOGRAM FOR BREAST CANCER: ICD-10-CM

## 2023-04-21 PROCEDURE — 77063 BREAST TOMOSYNTHESIS BI: CPT

## 2023-04-21 PROCEDURE — 77080 DXA BONE DENSITY AXIAL: CPT

## 2023-05-02 ENCOUNTER — HOSPITAL ENCOUNTER (OUTPATIENT)
Dept: LAB | Facility: MEDICAL CENTER | Age: 68
End: 2023-05-02
Attending: INTERNAL MEDICINE
Payer: COMMERCIAL

## 2023-05-02 ENCOUNTER — HOSPITAL ENCOUNTER (OUTPATIENT)
Dept: LAB | Facility: MEDICAL CENTER | Age: 68
End: 2023-05-02
Attending: NURSE PRACTITIONER
Payer: COMMERCIAL

## 2023-05-02 DIAGNOSIS — E03.9 ACQUIRED HYPOTHYROIDISM: ICD-10-CM

## 2023-05-02 DIAGNOSIS — E78.2 MIXED HYPERLIPIDEMIA: ICD-10-CM

## 2023-05-02 DIAGNOSIS — E55.9 VITAMIN D DEFICIENCY: ICD-10-CM

## 2023-05-02 LAB
25(OH)D3 SERPL-MCNC: 23 NG/ML (ref 30–100)
CHOLEST SERPL-MCNC: 140 MG/DL (ref 100–199)
FASTING STATUS PATIENT QL REPORTED: NORMAL
HDLC SERPL-MCNC: 59 MG/DL
LDLC SERPL CALC-MCNC: 72 MG/DL
T4 FREE SERPL-MCNC: 0.97 NG/DL (ref 0.93–1.7)
TRIGL SERPL-MCNC: 44 MG/DL (ref 0–149)
TSH SERPL DL<=0.005 MIU/L-ACNC: 19 UIU/ML (ref 0.38–5.33)

## 2023-05-02 PROCEDURE — 84439 ASSAY OF FREE THYROXINE: CPT

## 2023-05-02 PROCEDURE — 82306 VITAMIN D 25 HYDROXY: CPT

## 2023-05-02 PROCEDURE — 80061 LIPID PANEL: CPT

## 2023-05-02 PROCEDURE — 84443 ASSAY THYROID STIM HORMONE: CPT

## 2023-05-02 PROCEDURE — 36415 COLL VENOUS BLD VENIPUNCTURE: CPT

## 2023-05-10 NOTE — RESULT ENCOUNTER NOTE
Freddy I have reviewed your cholesterol panel which is dramatically improved on rosuvastatin.  Continue same medications  Will review further at upcoming appointment on 6/1/2023

## 2023-07-18 ENCOUNTER — HOSPITAL ENCOUNTER (OUTPATIENT)
Dept: LAB | Facility: MEDICAL CENTER | Age: 68
End: 2023-07-18
Attending: SPECIALIST
Payer: COMMERCIAL

## 2023-07-18 PROCEDURE — 36415 COLL VENOUS BLD VENIPUNCTURE: CPT

## 2023-07-18 PROCEDURE — 86304 IMMUNOASSAY TUMOR CA 125: CPT

## 2023-07-19 LAB — CANCER AG125 SERPL-ACNC: 16.2 U/ML (ref 0–35)

## 2023-08-14 ENCOUNTER — OFFICE VISIT (OUTPATIENT)
Dept: CARDIOLOGY | Facility: MEDICAL CENTER | Age: 68
End: 2023-08-14
Attending: INTERNAL MEDICINE
Payer: COMMERCIAL

## 2023-08-14 VITALS
HEIGHT: 64 IN | RESPIRATION RATE: 12 BRPM | WEIGHT: 202 LBS | DIASTOLIC BLOOD PRESSURE: 74 MMHG | BODY MASS INDEX: 34.49 KG/M2 | OXYGEN SATURATION: 97 % | SYSTOLIC BLOOD PRESSURE: 118 MMHG | HEART RATE: 66 BPM

## 2023-08-14 DIAGNOSIS — R94.31 ABNORMAL EKG: ICD-10-CM

## 2023-08-14 DIAGNOSIS — Z82.49 FAMILY HISTORY OF CARDIOVASCULAR DISEASE: ICD-10-CM

## 2023-08-14 DIAGNOSIS — I25.10 CORONARY ARTERY CALCIFICATION SEEN ON CAT SCAN: ICD-10-CM

## 2023-08-14 DIAGNOSIS — E78.2 MIXED HYPERLIPIDEMIA: ICD-10-CM

## 2023-08-14 PROCEDURE — 3078F DIAST BP <80 MM HG: CPT | Performed by: INTERNAL MEDICINE

## 2023-08-14 PROCEDURE — 3074F SYST BP LT 130 MM HG: CPT | Performed by: INTERNAL MEDICINE

## 2023-08-14 PROCEDURE — 99211 OFF/OP EST MAY X REQ PHY/QHP: CPT | Performed by: INTERNAL MEDICINE

## 2023-08-14 PROCEDURE — 99214 OFFICE O/P EST MOD 30 MIN: CPT | Performed by: INTERNAL MEDICINE

## 2023-08-14 ASSESSMENT — ENCOUNTER SYMPTOMS
SHORTNESS OF BREATH: 0
LOSS OF CONSCIOUSNESS: 0
COUGH: 0
PALPITATIONS: 0
DIZZINESS: 0
MYALGIAS: 0

## 2023-08-14 ASSESSMENT — FIBROSIS 4 INDEX: FIB4 SCORE: 1.18

## 2023-08-14 NOTE — PROGRESS NOTES
Chief Complaint   Patient presents with    Hyperlipidemia     F/V Dx: Mixed hyperlipidemia         Subjective     Freddy Smith is a 68 y.o. female who presents today for follow-up cardiac care.    The patient has coronary calcifications, systolic heart murmur and dyslipidemia.    Since 1/17/2022 appointment the patient had a coronary calcium score that was elevated and she was started on rosuvastatin 10 mg daily which she is tolerating.  Additionally she has been off of her thyroid but is yet to coordinate follow-up care with her PCP.  Additionally she went to [facility] ER 3/9/2023 with right anterior chest pain.  Troponin levels normal.  She was told that she had had a previous heart attack with the EKG showed anterior infarction pattern.  She was discovered to have a ovarian cyst and is having that evaluated.  She has had a prior history of ovarian cyst with removal with subsequent complications of incisional hernias.  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    The patient has a history of a heart murmur since birth but has had no further evaluation however it was thoroughly evaluated at the time of her induction into the [employer] with no apparent concerning findings.     The patient denies a history of hypertension, diabetes mellitus, hyperlipidemia or smoking history.     On 5/1/2019 the patient was seen in the emergency room for upper back pain of approximately 3 to 4 days after doing fairly strenuous labor.  Thoracoabdominal CTA was negative for aneurysm.  EKG was abnormal showing a previous MI. Troponin levels were normal.  Patient was discharged home.     The patient has significant family history of thoracic aneurysm in both her father and paternal grandmother and her mother suffered a heart attack in her 50s in addition to hypothyroidism.    Past Medical History:   Diagnosis Date     Elevated fasting glucose 1/9/2017    Hyperlipidemia     Postmenopausal 2011    Right foot pain 11/21/2018    Unspecified disorder of thyroid     Vitamin D deficiency      Past Surgical History:   Procedure Laterality Date    VENTRAL HERNIA REPAIR  9/7/2011    Performed by SAVANAH QUIÑONES at SURGERY [facility]    PANNICULECTOMY  9/7/2011    Performed by SAVANAH QUIÑONES at SURGERY [facility]    OTHER ABDOMINAL SURGERY  1990/2001/2006     incisional hernia repair     Family History   Problem Relation Age of Onset    Heart Disease Mother     Cancer Mother         breast    Diabetes Mother     Heart Attack Mother     Other Father         AAA    No Known Problems Sister     Alcohol/Drug Brother     Alcohol/Drug Brother     No Known Problems Sister     Other Paternal Aunt         MS    No Known Problems Maternal Grandmother     No Known Problems Maternal Grandfather     Heart Attack Paternal Grandmother         Thoracic Aneurysm    Other Paternal Grandfather         TB     Social History     Socioeconomic History    Marital status:      Spouse name: Not on file    Number of children: Not on file    Years of education: Not on file    Highest education level: Not on file   Occupational History    Not on file   Tobacco Use    Smoking status: Never    Smokeless tobacco: Never   Vaping Use    Vaping Use: Never used   Substance and Sexual Activity    Alcohol use: No    Drug use: No    Sexual activity: Yes     Partners: Male     Birth control/protection: Post-Menopausal     Comment:  38 years   Other Topics Concern    Not on file   Social History Narrative    Not on file     Social Determinants of Health     Financial Resource Strain: Not on file   Food Insecurity: Not on file   Transportation Needs: Not on file   Physical Activity: Not on file   Stress: Not on file   Social Connections: Not on file   Intimate Partner Violence: Not on file   Housing Stability: Not on file     No Known Allergies  Outpatient  "Encounter Medications as of 8/14/2023   Medication Sig Dispense Refill    CINNAMON PO Take  by mouth.      NON SPECIFIED tumeric      GUGGULIPID-BLACK PEPPER PO Take  by mouth.       No facility-administered encounter medications on file as of 8/14/2023.     Review of Systems   Respiratory:  Negative for cough and shortness of breath.    Cardiovascular:  Negative for chest pain and palpitations.   Musculoskeletal:  Negative for myalgias.   Neurological:  Negative for dizziness and loss of consciousness.              Objective     /74 (BP Location: Left arm, Patient Position: Sitting, BP Cuff Size: Adult)   Pulse 66   Resp 12   Ht 1.626 m (5' 4\")   Wt 91.6 kg (202 lb)   LMP 07/25/2011   SpO2 97%   BMI 34.67 kg/m²     Physical Exam  Vitals reviewed.   Constitutional:       General: She is not in acute distress.     Appearance: She is well-developed.   Eyes:      Conjunctiva/sclera: Conjunctivae normal.      Pupils: Pupils are equal, round, and reactive to light.   Neck:      Vascular: No JVD.   Cardiovascular:      Rate and Rhythm: Normal rate and regular rhythm.      Pulses: Normal pulses.      Heart sounds: Murmur heard.      No friction rub. No gallop.      Comments: Faint short systolic murmur.  Pulmonary:      Effort: Pulmonary effort is normal. No accessory muscle usage or respiratory distress.      Breath sounds: Normal breath sounds. No wheezing or rales.   Abdominal:      General: There is no abdominal bruit.      Palpations: There is no pulsatile mass.   Musculoskeletal:      Right lower leg: No edema.      Left lower leg: No edema.   Skin:     General: Skin is warm and dry.      Findings: No rash.      Nails: There is no clubbing.   Neurological:      Mental Status: She is alert and oriented to person, place, and time.      Coordination: Coordination normal.   Psychiatric:         Behavior: Behavior normal.            EKG 05/01/2019 normal sinus rhythm, rate 69.  Anterior septal MI, " age-indeterminate.  Tracing personally reviewed.     CHEST/ABDOMINAL CTA 05/01/2019  1. No aortic aneurysm or dissection. No acute abnormality in the chest, abdomen or pelvis.  2. Incidental two closely adjacent 4 mm nodules in the right middle lobe. Follow-up guidelines for high and low risk patients are outlined below.  3. Suspected 1.4 cm nodule in the left thyroid lobe. Recommend follow-up with outpatient ultrasound.      Assessment & Plan     1. Coronary artery calcification seen on CAT scan        2. Mixed hyperlipidemia        3. Family history of cardiovascular disease        4. Abnormal EKG  EC-ECHOCARDIOGRAM COMPLETE W/O CONT          Medical Decision Making: Today's Assessment/Status/Plan:   Assessment  Coronary artery disease manifest by the presence of coronary calcification on CT scan.  Systolic murmur.  Hypercholesterolemia, untreated.  Family history of cardiovascular disease.  Hypothyroidism.     Recommendation Discussion  Coronary calcification, clinically without any ischemic symptoms however EKG is abnormal point anterior infarction pattern therefore we will get an echocardiogram  Reviewed recent lipid panel which is significantly improved on rosuvastatin LDL 72 down from 134.  RTC 6 months

## 2023-08-19 NOTE — PROGRESS NOTES
[place] [address] [address]       Pt Name: Aki Guo  MRN: [id number]  [facility] [facility] 1960  Date of evaluation: 8/18/2023  Provider: DESIRE Fisher - NP   PCP: Precious Blair MD  Note Started: 12:43 PM 8/18/23     CHIEF COMPLAINT       Chief Complaint   Patient presents with    Medication Reaction        HISTORY OF PRESENT ILLNESS: 1 or more elements      History Provided by: Patient   History is limited by: Nothing     Aki Guo is a 66 y.o. female who presents by EMS to the ER. Patient reports that she recently started taking lithium and has had fecal incontinence since she started taking the medication. States she is not able to make it to the bathroom in time and has had multiple episodes of diarrhea. She denies nausea or vomiting. She reports abdominal pain. She denies fever. States that last week is when she had the abdominal pain but does not have pain today. States she has had diarrhea all day long. She denies chest pain or shortness of breath. She denies nausea or vomiting. On chart review noted patient had a recent hospitalization for behavioral health has history of paranoid schizophrenia. Nursing Notes were all reviewed and agreed with or any disagreements were addressed in the HPI. REVIEW OF SYSTEMS      Review of Systems   Constitutional:  Negative for fever. HENT:  Negative for congestion. Eyes:  Negative for visual disturbance. Respiratory:  Negative for shortness of breath. Cardiovascular:  Negative for chest pain. Gastrointestinal:  Positive for abdominal pain and diarrhea. Negative for nausea and vomiting. Genitourinary:  Negative for difficulty urinating. Musculoskeletal:  Negative for back pain and neck pain. Skin:  Negative for rash. Neurological:  Negative for dizziness, weakness and headaches. Psychiatric/Behavioral:  Negative for behavioral problems.     All other systems reviewed and are Subjective:   Freddy Smith  is a 67 y.o. female who presents for RUQ Pain (Complaints of chest pain, mainly in sternum, family hx of heart attacks, hx of heart murmur, pain hasn't radiated, gassy, not a lot of bloating, eating vegan or vegetables, hasn't taken thyroid medication, does take aspirin daily)      RUQ Pain  This is a new problem. The current episode started in the past 7 days. Pertinent negatives include no diarrhea, fever, nausea or vomiting.     Patient presents urgent care noting pain to the substernal area and right chest with radiation to right back onset over the last few hours today.  Patient suspects associated with bloating and gassy diet.  She states she has progressively changed to a vegan/vegetarian diet over the last 1 year and has had increased flatulence.  She suspects the above symptoms are associated with gas.  She denies a personal history of cardiac episodes.  Although review of chart demonstrates ER evaluation for chest pain with findings of prior infarct.  Patient had been referred to cardiology for family history of cardiovascular disease as well as coronary artery calcification seen on CT scan (mother with MI in 40s and father with aortic dissection and at 70).  Patient denies noting abnormal heartbeats or palpitations today.  She denies nausea vomiting or diaphoresis.  She denies abdominal pain or postprandial pain.  Patient denies symptoms of shortness of breath.  Patient denies dysuria frequency urgency hematuria.  Notes slight abnormal sensation over flank area of back right greater than left (patient reiterates multiple times this is not painful or other abnormal compared to contralateral side).    Review of Systems   Constitutional:  Negative for chills and fever.   Respiratory:  Negative for shortness of breath.    Cardiovascular:  Positive for chest pain. Negative for palpitations and leg swelling.   Gastrointestinal:  Negative for abdominal pain (Sensation of  "bloating), diarrhea, nausea and vomiting.   Musculoskeletal:         Slight right-sided back discomfort/abnormal sensation   Skin:  Negative for rash.     No Known Allergies     Objective:   /84   Pulse 64   Temp 36.4 °C (97.5 °F) (Temporal)   Resp 16   Ht 1.626 m (5' 4\")   Wt 96.4 kg (212 lb 9.6 oz)   LMP 07/25/2011   SpO2 97%   BMI 36.49 kg/m²     Physical Exam  Vitals and nursing note reviewed.   Constitutional:       General: She is not in acute distress.     Appearance: She is well-developed. She is not toxic-appearing or diaphoretic.   HENT:      Head: Normocephalic and atraumatic.      Right Ear: External ear normal.      Left Ear: External ear normal.      Nose: Nose normal.   Eyes:      General: Lids are normal. No scleral icterus.        Right eye: No discharge.         Left eye: No discharge.      Conjunctiva/sclera: Conjunctivae normal.   Cardiovascular:      Rate and Rhythm: Normal rate and regular rhythm. No extrasystoles are present.     Heart sounds: Normal heart sounds.      Comments: Slight appreciation of decreased radial pulses bilaterally, left worse than right  Pulmonary:      Effort: Pulmonary effort is normal. No respiratory distress.      Breath sounds: Normal breath sounds. No stridor. No wheezing, rhonchi or rales.   Chest:      Chest wall: No tenderness.   Musculoskeletal:         General: Normal range of motion.      Cervical back: Neck supple.      Right lower leg: No edema.      Left lower leg: No edema.   Skin:     General: Skin is warm and dry.      Coloration: Skin is not pale.      Findings: No erythema.   Neurological:      Mental Status: She is alert and oriented to person, place, and time. She is not disoriented.   Psychiatric:         Speech: Speech normal.         Behavior: Behavior normal.     EKG in the office reveals a normal sinus rhythm with a rate of 55. There is no ectopy, no ST elevation, slight ST depression in lead II.    Results for orders placed or " performed in visit on 03/08/23   POCT Urinalysis   Result Value Ref Range    POC Color LILA Negative    POC Appearance SLIGHTLY CLOUDY Negative    POC Glucose NEGATIVE Negative mg/dL    POC Bilirubin NEGATIVE Negative mg/dL    POC Ketones TRACE Negative mg/dL    POC Specific Gravity >1.030 <1.005 - >1.030    POC Blood MODERATE Negative    POC Urine PH 5.5 5.0 - 8.0    POC Protein NEGATIVE Negative mg/dL    POC Urobiligen 0.2 Negative (0.2) mg/dL    POC Nitrites NEGATIVE Negative    POC Leukocyte Esterase TRACE Negative         Assessment/Plan:   1. Chest pain, unspecified type  - POCT Urinalysis  - EKG    2. Family history of coronary artery disease    3. Family history of aortic dissection    I reviewed patient with her family history and personal history as well as her current symptoms I do recommend ER evaluation now.  She expresses appropriate understanding and states she would prefer to follow-up with her cardiologist outpatient.  I reiterated to the patient multiple times the limitations of urgent care evaluation and that she would warrant further work-up and care now tonight through the emergency department.  It is unclear if patient intends to proceed to ER despite my strong recommendations.    Patient has been directed to [facility] for further management/work up now, today.    I have worn an N95 mask, gloves and eye protection for the entire encounter with this patient.     Differential diagnosis, natural history, supportive care, and indications for immediate follow-up discussed.

## 2023-09-19 ENCOUNTER — OFFICE VISIT (OUTPATIENT)
Dept: MEDICAL GROUP | Facility: PHYSICIAN GROUP | Age: 68
End: 2023-09-19
Payer: COMMERCIAL

## 2023-09-19 VITALS
OXYGEN SATURATION: 98 % | HEART RATE: 66 BPM | DIASTOLIC BLOOD PRESSURE: 58 MMHG | SYSTOLIC BLOOD PRESSURE: 110 MMHG | BODY MASS INDEX: 34.49 KG/M2 | TEMPERATURE: 98.4 F | HEIGHT: 64 IN | WEIGHT: 202 LBS

## 2023-09-19 DIAGNOSIS — N83.201 CYST OF RIGHT OVARY: ICD-10-CM

## 2023-09-19 DIAGNOSIS — E03.9 ACQUIRED HYPOTHYROIDISM: ICD-10-CM

## 2023-09-19 PROCEDURE — 3074F SYST BP LT 130 MM HG: CPT | Performed by: NURSE PRACTITIONER

## 2023-09-19 PROCEDURE — 3078F DIAST BP <80 MM HG: CPT | Performed by: NURSE PRACTITIONER

## 2023-09-19 PROCEDURE — 99214 OFFICE O/P EST MOD 30 MIN: CPT | Performed by: NURSE PRACTITIONER

## 2023-09-19 RX ORDER — LEVOTHYROXINE SODIUM 0.15 MG/1
150 TABLET ORAL
Qty: 90 TABLET | Refills: 3 | Status: SHIPPED | OUTPATIENT
Start: 2023-09-19

## 2023-09-19 RX ORDER — ROSUVASTATIN CALCIUM 10 MG/1
10 TABLET, COATED ORAL EVERY EVENING
COMMUNITY

## 2023-09-19 RX ORDER — ASPIRIN 81 MG/1
20.25 TABLET, CHEWABLE ORAL DAILY
COMMUNITY
End: 2023-11-21

## 2023-09-19 ASSESSMENT — FIBROSIS 4 INDEX: FIB4 SCORE: 1.18

## 2023-09-19 NOTE — ASSESSMENT & PLAN NOTE
New to examiner, chronic for patient. Was following with gynecology, Dr. Lambert. She was last seen on 9/2/2023 then notified 9/4/2023 that his practice was closed. She completed an US in March 2023, results below. She then had an MRI in July 2023 and CT scan on 8/29/2023, patient to get results forwarded to [facility]. Requesting referral to Dr. Resendiz with gynecology oncology.  US march  8.4 cm right ovarian cyst. Recommend GYN consult and follow-up ultrasound in one month.

## 2023-09-24 NOTE — ASSESSMENT & PLAN NOTE
Chronic, uncontrolled. Has been out of levothyroxine 150 mcg/day, recent labs below. Requesting medication refill.   Latest Reference Range & Units 08/09/19 11:32 11/05/19 08:55 08/03/20 09:54 09/14/21 05:16 05/02/23 08:33   TSH 0.380 - 5.330 uIU/mL 10.670 (H) 3.740 14.000 (H) 28.500 (H) 19.000 (H)   Free T-4 0.93 - 1.70 ng/dL 0.91  1.17 0.99 0.97

## 2023-09-24 NOTE — PROGRESS NOTES
CC: Diagnoses of Cyst of right ovary and Acquired hypothyroidism were pertinent to this visit.                                                                                                                                       HPI:   Freddy presents today with the following concerns:    Cyst of right ovary  New to examiner, chronic for patient. Was following with gynecology, Dr. Lambert. She was last seen on 9/2/2023 then notified 9/4/2023 that his practice was closed. She completed an US in March 2023, results below. She then had an MRI in July 2023 and CT scan on 8/29/2023, patient to get results forwarded to [facility]. Requesting referral to Dr. Resendiz with gynecology oncology.  US march  8.4 cm right ovarian cyst. Recommend GYN consult and follow-up ultrasound in one month.    Acquired hypothyroidism  Chronic, uncontrolled. Has been out of levothyroxine 150 mcg/day, recent labs below. Requesting medication refill.   Latest Reference Range & Units 08/09/19 11:32 11/05/19 08:55 08/03/20 09:54 09/14/21 05:16 05/02/23 08:33   TSH 0.380 - 5.330 uIU/mL 10.670 (H) 3.740 14.000 (H) 28.500 (H) 19.000 (H)   Free T-4 0.93 - 1.70 ng/dL 0.91  1.17 0.99 0.97     Patient Active Problem List    Diagnosis Date Noted    Cyst of right ovary 09/19/2023    Hospital discharge follow-up 03/15/2023    Vertigo 01/27/2022    Family history of cardiovascular disease 01/17/2022    History of heart murmur in childhood 09/10/2020    Thyroid nodule 10/24/2019    Lung nodule 10/24/2019    Coronary artery calcification seen on CAT scan 08/07/2019    Mixed hyperlipidemia 08/07/2019    Obesity (BMI 30-39.9) 01/09/2017    Vitamin D deficiency 01/09/2017    Generalized osteoarthritis 05/01/2014    Acquired hypothyroidism 05/01/2014     Current Outpatient Medications   Medication Sig Dispense Refill    aspirin (ASA) 81 MG Chew Tab chewable tablet Chew 20.25 mg every day.      rosuvastatin (CRESTOR) 10 MG Tab Take 10 mg by mouth every evening.       "levothyroxine (SYNTHROID) 150 MCG Tab Take 1 Tablet by mouth every morning on an empty stomach. 90 Tablet 3    CINNAMON PO Take  by mouth.      NON SPECIFIED tumeric      GUGGULIPID-BLACK PEPPER PO Take  by mouth.       No current facility-administered medications for this visit.     Allergies as of 09/19/2023    (No Known Allergies)      ROS:  Constitutional: No fevers, chills, malaise/fatigue.  Eyes: No eye pain.  ENT: No sore throat, congestion.   Resp: No cough, shortness of breath.  CV: No chest pain, leg swelling, palpitations.  GI: No nausea/vomiting, abdominal pain, constipation, diarrhea.  : No dysuria, hematuria.  MSK: No weakness.  Skin: No rashes.  Neuro: No dizziness, weakness, headaches.  Psych: No suicidal ideations.    All remaining systems reviewed and found to be negative, except as stated above.      /58 (BP Location: Left arm, Patient Position: Sitting, BP Cuff Size: Adult)   Pulse 66   Temp 36.9 °C (98.4 °F) (Temporal)   Ht 1.626 m (5' 4\")   Wt 91.6 kg (202 lb)   LMP 07/25/2011   SpO2 98%   BMI 34.67 kg/m²     Physical Exam:  General: Well nourished, well developed female in NAD, awake and conversant.  Eyes: Normal conjunctiva, anicteric.  Round symmetrical pupils.  ENT: Hearing grossly intact.  No nasal discharge.  Neck: Neck is supple.  No masses or thyromegaly.  CV: No lower extremity edema.  Respiratory: Respirations are nonlabored.  No wheezing.  Abdomen: Non-Distended.  Skin: Warm.  No rashes or ulcers.  MSK: Normal ambulation.  No clubbing or cyanosis.  Neuro: Sensation and CN II-XII grossly normal.  Psych: Alert and oriented.  Cooperative, appropriate mood and affect, normal judgment.      Assessment and Plan.   68 y.o. female with the following issues:  1. Cyst of right ovary  New to examiner, chronic for patient. Referral to Dr. Resendiz to discuss management of right ovarian cyst. Recommend having MRI and CT scan results also forwarded to his office.  - Referral to " Gynecologic Oncology    2. Acquired hypothyroidism  Chronic, uncontrolled. Restart levothyroxine 150 mcg/day and repeat thyroid labs in November 2023, follow up in clinic to review results and medication dosage.  - ANTITHYROGLOBULIN AB; Future  - T3 FREE; Future  - FREE THYROXINE; Future  - TSH; Future  - levothyroxine (SYNTHROID) 150 MCG Tab; Take 1 Tablet by mouth every morning on an empty stomach.  Dispense: 90 Tablet; Refill: 3     Return in about 2 months (around 11/19/2023) for Thyroid, Follow up Labs.     Please note that this dictation was created using voice recognition software. I have worked with consultants from the vendor as well as technical experts from [employer] to optimize the interface. I have made every reasonable attempt to correct obvious errors, but I expect that there are errors of grammar and possibly content that I did not discover before finalizing the note.

## 2023-10-06 ENCOUNTER — HOSPITAL ENCOUNTER (EMERGENCY)
Facility: MEDICAL CENTER | Age: 68
End: 2023-10-06
Attending: STUDENT IN AN ORGANIZED HEALTH CARE EDUCATION/TRAINING PROGRAM
Payer: COMMERCIAL

## 2023-10-06 ENCOUNTER — OFFICE VISIT (OUTPATIENT)
Dept: URGENT CARE | Facility: CLINIC | Age: 68
End: 2023-10-06
Payer: COMMERCIAL

## 2023-10-06 VITALS
TEMPERATURE: 97.9 F | HEART RATE: 69 BPM | DIASTOLIC BLOOD PRESSURE: 78 MMHG | BODY MASS INDEX: 31.71 KG/M2 | OXYGEN SATURATION: 97 % | SYSTOLIC BLOOD PRESSURE: 140 MMHG | HEIGHT: 67 IN | WEIGHT: 202 LBS | RESPIRATION RATE: 14 BRPM

## 2023-10-06 VITALS
TEMPERATURE: 98.5 F | DIASTOLIC BLOOD PRESSURE: 78 MMHG | HEIGHT: 65 IN | BODY MASS INDEX: 33.65 KG/M2 | HEART RATE: 71 BPM | OXYGEN SATURATION: 96 % | SYSTOLIC BLOOD PRESSURE: 159 MMHG | WEIGHT: 201.94 LBS | RESPIRATION RATE: 16 BRPM

## 2023-10-06 DIAGNOSIS — H54.7 LOSS OF VISION: ICD-10-CM

## 2023-10-06 DIAGNOSIS — H43.12 VITREOUS HEMORRHAGE OF LEFT EYE (HCC): ICD-10-CM

## 2023-10-06 DIAGNOSIS — H53.9 VISUAL DISTURBANCE: ICD-10-CM

## 2023-10-06 DIAGNOSIS — H53.8 BLURRY VISION, LEFT EYE: ICD-10-CM

## 2023-10-06 DIAGNOSIS — H53.8 BLURRED VISION: ICD-10-CM

## 2023-10-06 PROCEDURE — 3078F DIAST BP <80 MM HG: CPT | Performed by: PHYSICIAN ASSISTANT

## 2023-10-06 PROCEDURE — 99215 OFFICE O/P EST HI 40 MIN: CPT | Performed by: PHYSICIAN ASSISTANT

## 2023-10-06 PROCEDURE — 99283 EMERGENCY DEPT VISIT LOW MDM: CPT

## 2023-10-06 PROCEDURE — 3077F SYST BP >= 140 MM HG: CPT | Performed by: PHYSICIAN ASSISTANT

## 2023-10-06 ASSESSMENT — ENCOUNTER SYMPTOMS
PHOTOPHOBIA: 0
EYE REDNESS: 0
EYE PAIN: 0
BLURRED VISION: 1
DOUBLE VISION: 0
EYE DISCHARGE: 0

## 2023-10-06 ASSESSMENT — FIBROSIS 4 INDEX
FIB4 SCORE: 1.18
FIB4 SCORE: 1.18

## 2023-10-06 NOTE — ED NOTES
Visual Acuity is done on patient:     Both Eyes: 20/70  Left Eye: PT states that this eye is very blurry at this time.   Right Eye: 20/70

## 2023-10-06 NOTE — DISCHARGE INSTRUCTIONS
You appear to have vitreous hemorrhage, sleep at 30 degree with your head upright, stop taking aspirin, avoid vigorous activity.

## 2023-10-06 NOTE — ED NOTES
Chief Complaint   Patient presents with    Blurred Vision     Seeing strands left eye started last night -started dot around 7pm     Pt ambulated to triage with above complaint sent from urgent care.   Left pupil round , reacts to light. +blurred vision.

## 2023-10-06 NOTE — PROGRESS NOTES
Subjective:   Freddy Smith is a 68 y.o. female who presents today with   Chief Complaint   Patient presents with    Eye Problem     Pt has trouble seeing on (L) eye, spots x last night      Eye Problem   The left eye is affected. This is a new problem. The current episode started yesterday. The problem occurs constantly. The problem has been unchanged. Associated symptoms include blurred vision. Pertinent negatives include no eye discharge, double vision, eye redness or photophobia.     New onset of floater initially dark spot that turned into wound up yarn appearance and now patient notes streaks of yarn through the eye on the left side along with blurred vision.     PMH:  has a past medical history of Elevated fasting glucose (1/9/2017), Hyperlipidemia, Postmenopausal (2011), Right foot pain (11/21/2018), Unspecified disorder of thyroid, and Vitamin D deficiency.  MEDS:   Current Outpatient Medications:     aspirin (ASA) 81 MG Chew Tab chewable tablet, Chew 20.25 mg every day., Disp: , Rfl:     rosuvastatin (CRESTOR) 10 MG Tab, Take 10 mg by mouth every evening., Disp: , Rfl:     levothyroxine (SYNTHROID) 150 MCG Tab, Take 1 Tablet by mouth every morning on an empty stomach., Disp: 90 Tablet, Rfl: 3    CINNAMON PO, Take  by mouth., Disp: , Rfl:     GUGGULIPID-BLACK PEPPER PO, Take  by mouth., Disp: , Rfl:     NON SPECIFIED, tumeric, Disp: , Rfl:   ALLERGIES: No Known Allergies  SURGHX:   Past Surgical History:   Procedure Laterality Date    VENTRAL HERNIA REPAIR  9/7/2011    Performed by SAVANAH QUIÑONES at SURGERY [facility] ORS    PANNICULECTOMY  9/7/2011    Performed by SAVANAH QUIÑONES at SURGERY [facility] ORS    OTHER ABDOMINAL SURGERY  1990/2001/2006     incisional hernia repair     SOCHX:  reports that she has never smoked. She has never used smokeless tobacco. She reports that she does not drink alcohol and does not use drugs.  FH: Reviewed with patient, not pertinent to this visit.     Review of  "Systems   Eyes:  Positive for blurred vision. Negative for double vision, photophobia, pain, discharge and redness.        Left eye floaters        Objective:   BP (!) 140/78 (BP Location: Left arm, Patient Position: Sitting, BP Cuff Size: Large adult)   Pulse 69   Temp 36.6 °C (97.9 °F) (Temporal)   Resp 14   Ht 1.702 m (5' 7\")   Wt 91.6 kg (202 lb)   LMP 07/25/2011   SpO2 97%   BMI 31.64 kg/m²   Physical Exam  Vitals and nursing note reviewed.   Constitutional:       General: She is not in acute distress.     Appearance: Normal appearance. She is well-developed. She is not ill-appearing or toxic-appearing.   HENT:      Head: Normocephalic and atraumatic.      Right Ear: Hearing normal.      Left Ear: Hearing normal.   Eyes:      Extraocular Movements: Extraocular movements intact.      Conjunctiva/sclera: Conjunctivae normal.      Right eye: Right conjunctiva is not injected.      Left eye: Left conjunctiva is not injected.      Pupils: Pupils are equal, round, and reactive to light.   Cardiovascular:      Rate and Rhythm: Normal rate.   Pulmonary:      Effort: Pulmonary effort is normal.   Musculoskeletal:      Comments: Normal movement in all 4 extremities   Skin:     General: Skin is warm and dry.   Neurological:      General: No focal deficit present.      Mental Status: She is alert and oriented to person, place, and time.      GCS: GCS eye subscore is 4. GCS verbal subscore is 5. GCS motor subscore is 6.      Cranial Nerves: No dysarthria or facial asymmetry.      Coordination: Coordination normal.   Psychiatric:         Mood and Affect: Mood normal.       Visual acuity right 20/50  Left 20/200   both 20/50    Assessment/Plan:   Assessment    1. Blurred vision    2. Loss of vision  Given new onset of blurred vision/loss of vision on recommend patient go to the ER at this time for higher level of care and evaluation.  Was able to call transfer center and provided report.  Patient elects to go by " private vehicle with her  taking her to the ER at this time.  No other acute neurodeficits noted on exam today  Patient agreeable to plan.      Please note that this dictation was created using voice recognition software. I have made every reasonable attempt to correct obvious errors, but I expect that there are errors of grammar and possibly content that I did not discover before finalizing the note.    Karan Ludwig PA-C

## 2023-10-06 NOTE — ED PROVIDER NOTES
CHIEF COMPLAINT  Chief Complaint   Patient presents with    Blurred Vision     Seeing strands left eye started last night -started dot around 7pm       LIMITATION TO HISTORY   Select:     MAYCO Smith is a 68 y.o. female who presents to the Emergency Department for evaluation of some specks of darkness in her left field of vision gradual worsening since 7 7 PM last night she denies any field cuts or loss of vision she denies headaches or fevers or pain, denies trauma to the left eye reports she is seeing some strands of material that come and go in her left eye.  She takes an aspirin daily prophylactically for secondary prevention.  She was seen in the urgent care today and told to come to the emergency department for further evaluation     OUTSIDE HISTORIAN(S):  Select:    EXTERNAL RECORDS REVIEWED  Select: Reviewed urgent care note from Oziel Russo today      PAST MEDICAL HISTORY  Past Medical History:   Diagnosis Date    Elevated fasting glucose 1/9/2017    Hyperlipidemia     Postmenopausal 2011    Right foot pain 11/21/2018    Unspecified disorder of thyroid     Vitamin D deficiency      .    SURGICAL HISTORY  Past Surgical History:   Procedure Laterality Date    VENTRAL HERNIA REPAIR  9/7/2011    Performed by SAVANAH QUIÑONES at SURGERY [facility] ORS    PANNICULECTOMY  9/7/2011    Performed by SAVANAH QUIÑONES at SURGERY [facility] ORS    OTHER ABDOMINAL SURGERY  1990/2001/2006     incisional hernia repair         FAMILY HISTORY  Family History   Problem Relation Age of Onset    Heart Disease Mother     Cancer Mother         breast    Diabetes Mother     Heart Attack Mother     Other Father         AAA    No Known Problems Sister     Alcohol/Drug Brother     Alcohol/Drug Brother     No Known Problems Sister     Other Paternal Aunt         MS    No Known Problems Maternal Grandmother     No Known Problems Maternal Grandfather     Heart Attack Paternal Grandmother         Thoracic Aneurysm    Other  "Paternal Grandfather         TB          SOCIAL HISTORY  Social History     Socioeconomic History    Marital status:      Spouse name: Not on file    Number of children: Not on file    Years of education: Not on file    Highest education level: Not on file   Occupational History    Not on file   Tobacco Use    Smoking status: Never    Smokeless tobacco: Never   Vaping Use    Vaping Use: Never used   Substance and Sexual Activity    Alcohol use: No    Drug use: No    Sexual activity: Yes     Partners: Male     Birth control/protection: Post-Menopausal     Comment:  38 years   Other Topics Concern    Not on file   Social History Narrative    Not on file     Social Determinants of Health     Financial Resource Strain: Not on file   Food Insecurity: Not on file   Transportation Needs: Not on file   Physical Activity: Not on file   Stress: Not on file   Social Connections: Not on file   Intimate Partner Violence: Not on file   Housing Stability: Not on file         CURRENT MEDICATIONS  No current facility-administered medications on file prior to encounter.     Current Outpatient Medications on File Prior to Encounter   Medication Sig Dispense Refill    aspirin (ASA) 81 MG Chew Tab chewable tablet Chew 20.25 mg every day.      rosuvastatin (CRESTOR) 10 MG Tab Take 10 mg by mouth every evening.      levothyroxine (SYNTHROID) 150 MCG Tab Take 1 Tablet by mouth every morning on an empty stomach. 90 Tablet 3    CINNAMON PO Take  by mouth.      NON SPECIFIED tumeric      GUGGULIPID-BLACK PEPPER PO Take  by mouth.             ALLERGIES  No Known Allergies    PHYSICAL EXAM  VITAL SIGNS:BP (!) 159/78   Pulse 71   Temp 36.9 °C (98.5 °F) (Temporal)   Resp 16   Ht 1.651 m (5' 5\")   Wt 91.6 kg (201 lb 15.1 oz)   LMP 07/25/2011   SpO2 96%   BMI 33.60 kg/m²       GENERAL: Awake and alert  HEAD: Normocephalic and atraumatic  NECK: Normal range of motion, without meningismus  PASTE EYE EXAM: EYES: R eye 20/70, L " eye 20/70, EOMI, peripheral visual fields intact by confrontation, conjunctiva white  ENT: Mucous membranes moist, oropharynx clear  PULMONARY: Normal effort, clear to auscultation  CARDIOVASCULAR: No murmurs, clicks or rubs, peripheral pulses 2+  ABDOMINAL: Soft, non-tender, no guarding or rigidity present, no pulsatile masses  BACK: no midline tenderness, no costovertebral tenderness  NEUROLOGICAL: Grossly non-focal neurological examination, speech normal, gait normal  EXTREMITIES: No edema, normal to inspection  SKIN: Warm and dry.  PSYCHIATRIC: Affect is appropriate    DIAGNOSTIC STUDIES / PROCEDURES  EKG      Point of Care Ultrasound    ED POINT OF CARE ULTRASOUND: OCULAR    Indication: Blurry VIsion  Procedure:  Transverse and longitudinal views of the left eye were obtained.  Evidence of retinal or vitreous detachment was not seen.  Vitreous hemorrhage was seen.      Image retained through [facility] as seen below:             Additional interpretation: Washing machine sign observed on the left eye degrees across the optic nerve sheath findings consistent with vitreous hemorrhage,       This study is a limited ultrasound examination performed and interpreted to evaluate for limited conditions as outlined above. There may be other clinically important information contained in the images that is outside this scope. When clinically warranted, a comprehensive ultrasound through the appropriate department is considered.    LABS  Labs Reviewed - No data to display      RADIOLOGY      ED COURSE:      INTERVENTIONS BY ME:  Medications - No data to display        INITIAL ASSESSMENT, COURSE AND PLAN  Care Narrative:     Patient presenting with specks of black in the left eye and blurry vision, at this time I do not see signs of temporal arteritis she has no pain and on ultrasound she has convincing signs of vitreous hemorrhage, do not see signs of retinal detachment based on her history and imaging results, and based on her  eye exam she did have diminished acuity in bilateral eyes is unclear to me why her right eye is affected she had no blurred vision in the right eye.  Management recommended the patient based on consultation with ophthalmology recommended urgent ophthalmology follow-up return precautions provided for worsening symptoms.         ADDITIONAL PROBLEM LIST    DISPOSITION AND DISCUSSIONS  I have discussed management of the patient with the following physicians and [place]'s:   Spoke to ophthalmologist on-call Dr. Kimbrough who said outpatient management is appropriate at this time      Escalation of care considered, and ultimately not performed:diagnostic imaging, consider CTA for evaluate for ischemic process, based on ultrasound results I do not feel this is of utility, considered obtaining ESR and blood work for evaluation of temporal arteritis however patient has no pain    1. Blurry vision, left eye    2. Visual disturbance    3. Vitreous hemorrhage of left eye (HCC)             Electronically signed by: Brayden Finley DO ,5:17 PM 10/06/23

## 2023-11-01 ENCOUNTER — GYNECOLOGY VISIT (OUTPATIENT)
Dept: OBGYN | Facility: CLINIC | Age: 68
End: 2023-11-01
Payer: COMMERCIAL

## 2023-11-01 VITALS
SYSTOLIC BLOOD PRESSURE: 155 MMHG | HEIGHT: 68 IN | BODY MASS INDEX: 31.07 KG/M2 | WEIGHT: 205 LBS | DIASTOLIC BLOOD PRESSURE: 85 MMHG

## 2023-11-01 DIAGNOSIS — N83.202 CYST OF LEFT OVARY: ICD-10-CM

## 2023-11-01 PROCEDURE — 3079F DIAST BP 80-89 MM HG: CPT | Performed by: OBSTETRICS & GYNECOLOGY

## 2023-11-01 PROCEDURE — 99203 OFFICE O/P NEW LOW 30 MIN: CPT | Performed by: OBSTETRICS & GYNECOLOGY

## 2023-11-01 PROCEDURE — 3077F SYST BP >= 140 MM HG: CPT | Performed by: OBSTETRICS & GYNECOLOGY

## 2023-11-01 ASSESSMENT — FIBROSIS 4 INDEX: FIB4 SCORE: 1.18

## 2023-11-01 NOTE — PROGRESS NOTES
Patient here for GYN exam.  Right Ovarian Cysts  BCM: postmenopausal  Last pap: unsure  Last mammogram if applies: 4/21/23  Pharmacy verified

## 2023-11-01 NOTE — PROGRESS NOTES
New Gynecological Visit    Freddy Smith    68 y.o.    Chief complaint    Chief Complaint   Patient presents with    Gynecologic Exam     Right Ovarian Cysts       HPI    Patient is a pleasant 67 yo  who presents for findings of incidental right ovarian cyst.   She reports she had been to the ER back in March for chest pain and for unclear reasons she also underwent CT of the abdomen and pelvis which revealed an incidental 8 cm right ovarian cyst. She had no pelvic pain or any other pelvic sympomatology. She had a pelvic ultrasound in ER at that time which showed an 8.4 cm thin walled right ovarian cyst.   She reports that in her 3rd pregnancy in  she had an ovarian cyst removal during her pregnancy, and at that time she thought that her ovary was removed.   She also reports she has had multiple abdominal hernia repairs as well as an abdominoplasty by general surgeon here in town. She reports she has mesh spanning the entirety of her abdomen.   She had been evaluated by a community gynecologist who is no longer practicing. Was told she still had an ovarian cyst at her last visit in their office in July but did not have any formal follow up repeat ultrasounds. Tumor markers were also normal. Her records are reviewed and are scanned into the chart. She states she was subjected to a vigorous exam which was painful but otherwise she has no abdominal pain, bloating, pressure, changes in bowel/bladder, early satiety, changes in appetite or weight.   She is sexually active. Underwent menopause age 57.       Review of Systems:  Review of Systems   All other systems reviewed and are negative.       Past Obstetrical History:     x 4    Past Gynecological History:    Last pap:  NILM per records  H/o abnormal pap: No  H/o STIs: No  DEXA: 23 normal  Last Mammogram: 25  LMP: age 57  BCM: Postmenppause    Past Medical History    Past Medical History:   Diagnosis Date    Elevated fasting glucose  "1/9/2017    Hyperlipidemia     Postmenopausal 2011    Right foot pain 11/21/2018    Unspecified disorder of thyroid     Vitamin D deficiency        Past Surgical History    Past Surgical History:   Procedure Laterality Date    VENTRAL HERNIA REPAIR  9/7/2011    Performed by SAVANAH QUIÑONES at SURGERY [facility]S    PANNICULECTOMY  9/7/2011    Performed by SAVANAH QUIÑONES at SURGERY [facility] OR[facility]    OTHER ABDOMINAL SURGERY  1990/2001/2006     incisional hernia repair       Family History   Problem Relation Age of Onset    Heart Disease Mother     Cancer Mother         breast    Diabetes Mother     Heart Attack Mother     Other Father         AAA    No Known Problems Sister     Alcohol/Drug Brother     Alcohol/Drug Brother     No Known Problems Sister     Other Paternal Aunt         MS    No Known Problems Maternal Grandmother     No Known Problems Maternal Grandfather     Heart Attack Paternal Grandmother         Thoracic Aneurysm    Other Paternal Grandfather         TB       Allergies    No Known Allergies    Medications    Current Outpatient Medications   Medication Sig Dispense Refill    rosuvastatin (CRESTOR) 10 MG Tab Take 10 mg by mouth every evening.      levothyroxine (SYNTHROID) 150 MCG Tab Take 1 Tablet by mouth every morning on an empty stomach. 90 Tablet 3    CINNAMON PO Take  by mouth.      NON SPECIFIED tumeric      GUGGULIPID-BLACK PEPPER PO Take  by mouth.      aspirin (ASA) 81 MG Chew Tab chewable tablet Chew 20.25 mg every day. (Patient not taking: Reported on 11/1/2023)       No current facility-administered medications for this visit.       Social  Social History     Tobacco Use    Smoking status: Never    Smokeless tobacco: Never   Vaping Use    Vaping Use: Never used   Substance Use Topics    Alcohol use: No    Drug use: No        OBJECTIVE:    Vitals    BP (!) 155/85 (BP Location: Right arm, Patient Position: Sitting, BP Cuff Size: Adult)   Ht 5' 8\"   Wt 205 lb   LMP 07/25/2011   BMI " 31.17 kg/m²     Physical Exam    GENERAL: Well developed, well nourished, female in no acute distress.    HEENT: NCAT, mucus membranes moist    Neck: Supple, nontender, no BREANNA, no thyromegaly    CV: RRR    Pulm: CTAB    Abdomen: Soft ND NT. Multiple prior abdominal scars well healed, abdominoplasty scars present.     Ext: ACEVEDO    : Deferred    Labs/Pathology:     Latest Reference Range & Units 07/18/23 11:34   Ca 125 0.0 - 35.0 U/mL 16.2      Latest Reference Range & Units 05/02/23 08:33   25-Hydroxy   Vitamin D 25 30 - 100 ng/mL 23 (L)   TSH 0.380 - 5.330 uIU/mL 19.000 (H)   Free T-4 0.93 - 1.70 ng/dL 0.97   (L): Data is abnormally low  (H): Data is abnormally high    Imaging:    3/9/2023 9:07 AM PELVIC US     HISTORY/REASON FOR EXAM:  Pain; evaluate right ovarian cyst        TECHNIQUE/EXAM DESCRIPTION:  Transabdominal and transvaginal pelvic ultrasound.     COMPARISON:   Renal colic CT 3/9/2023     FINDINGS:  Both transabdominal and transvaginal scanning were performed to optimally visualize the pelvis.     UTERUS:  The uterus measures 3.41 cm x 6.87 cm x 3.80 cm.  The uterine myometrium is within normal limits.  The endometrial echo complex measures 0.30 cm.  The endometrium is unremarkable in appearance and thickness for age and menstrual status.        OVARIES:  The right ovary measures 8.95 cm x 6.30 cm x 7.23 cm. Duplex Doppler examination of the right ovary shows normal waveforms. There is an 8.4 x 7.0 x 5.8 cm thin-walled right ovarian cyst.     The left ovary is not visualized secondary to overlying bowel gas     There is no free fluid seen.     IMPRESSION:     1.  8.4 cm right ovarian cyst. Recommend GYN consult and follow-up ultrasound in one month.      3/9/2023 6:42 AM RENAL US      HISTORY/REASON FOR EXAM:  CHEST PAIN.     TECHNIQUE/EXAM DESCRIPTION:     CT scan of the abdomen and pelvis without contrast.     Noncontrast helical scanning was obtained from the diaphragmatic domes through the pubic  symphysis.     Low dose optimization technique was utilized for this CT exam including automated exposure control and adjustment of the mA and/or kV according to patient size.     COMPARISON: May 1, 2019     FINDINGS:     Lower Chest: Unremarkable.     Liver: Normal.     Spleen: Unremarkable.     Pancreas: Unremarkable.     Gallbladder: No calcified stones.     Biliary: Nondilated.     Adrenal glands: Normal.     Kidneys: Bilateral punctate renal calculi are seen. There is area of peripheral calcification along the right renal capsule. There is no visualized hydronephrosis.     Bowel: No obstruction or acute inflammation. Scattered colonic diverticula are seen. The appendix is not definitively identified. Surgical clips adjacent to cecum suggests prior changes of appendectomy.     Lymph nodes: No adenopathy.     Vasculature: Atherosclerotic changes are noted.     Peritoneum: No visualized ascites. Hazy fat stranding at the mesenteric root is seen.     Musculoskeletal: No acute or destructive process.     Pelvis: No adenopathy or free fluid. There is 5.7 x 8.0 cm right ovarian cyst, otherwise the uterus and adnexal structures appear within physiologic limits.     IMPRESSION:        1.  Bilateral nephrolithiasis without visualized obstructive changes  2.  Large right ovarian cyst, recommend further evaluation with pelvic sonogram.  3.  Hazy fat stranding of the mesenteric root, appears similar to prior study. Can be associated with ventriculitis or sclerosing mesenteritis in the appropriate clinical setting.  4.  Diverticulosis  5.  Atherosclerosis    A/P    Patient Active Problem List   Diagnosis    Generalized osteoarthritis    Acquired hypothyroidism    Obesity (BMI 30-39.9)    Vitamin D deficiency    Coronary artery calcification seen on CAT scan    Mixed hyperlipidemia    Thyroid nodule    Lung nodule    History of heart murmur in childhood    Family history of cardiovascular disease    [facility]  discharge follow-up    Cyst of right ovary       Freddy Smith    68 y.o.  with incidentally found 8 cm right ovarian cyst > 6 months ago on imaging.  Reported imaging cyst appears simple, thin walled and with normal Ca125, no symptoms, suspicion of malignancy is low. I recommended we repeat a formal pelvic ultrasound to follow up this cyst. If persistent or enlarging, may need surgical intervention. Due to her prior extensive abdominal hernia repairs and mesh in place, may need assistance from general surgery intraoperatively.  Patient understanding and agreeable with plan.     RTC after repeat pelvic US complete.     1. Cyst of left ovary      Orders Placed This Encounter    US-PELVIC COMPLETE (TRANSABDOMINAL/TRANSVAGINAL) (COMBO)        Time spent: 30 minutes        Ish Zhou M.D.    Obstetrics and Gynecology    :31 AM

## 2023-11-13 ENCOUNTER — HOSPITAL ENCOUNTER (OUTPATIENT)
Dept: LAB | Facility: MEDICAL CENTER | Age: 68
End: 2023-11-13
Attending: NURSE PRACTITIONER
Payer: COMMERCIAL

## 2023-11-13 ENCOUNTER — HOSPITAL ENCOUNTER (OUTPATIENT)
Dept: RADIOLOGY | Facility: MEDICAL CENTER | Age: 68
End: 2023-11-13
Attending: OBSTETRICS & GYNECOLOGY
Payer: COMMERCIAL

## 2023-11-13 ENCOUNTER — APPOINTMENT (OUTPATIENT)
Dept: LAB | Facility: MEDICAL CENTER | Age: 68
End: 2023-11-13
Attending: OBSTETRICS & GYNECOLOGY
Payer: COMMERCIAL

## 2023-11-13 DIAGNOSIS — N83.202 CYST OF LEFT OVARY: ICD-10-CM

## 2023-11-13 DIAGNOSIS — E03.9 ACQUIRED HYPOTHYROIDISM: ICD-10-CM

## 2023-11-13 PROCEDURE — 84439 ASSAY OF FREE THYROXINE: CPT

## 2023-11-13 PROCEDURE — 84443 ASSAY THYROID STIM HORMONE: CPT

## 2023-11-13 PROCEDURE — 86800 THYROGLOBULIN ANTIBODY: CPT

## 2023-11-13 PROCEDURE — 76830 TRANSVAGINAL US NON-OB: CPT

## 2023-11-13 PROCEDURE — 84481 FREE ASSAY (FT-3): CPT

## 2023-11-13 PROCEDURE — 36415 COLL VENOUS BLD VENIPUNCTURE: CPT

## 2023-11-14 ENCOUNTER — TELEPHONE (OUTPATIENT)
Dept: OBGYN | Facility: CLINIC | Age: 68
End: 2023-11-14
Payer: COMMERCIAL

## 2023-11-14 LAB
T3FREE SERPL-MCNC: 2.66 PG/ML (ref 2–4.4)
T4 FREE SERPL-MCNC: 0.95 NG/DL (ref 0.93–1.7)
TSH SERPL DL<=0.005 MIU/L-ACNC: 20.9 UIU/ML (ref 0.38–5.33)

## 2023-11-14 NOTE — TELEPHONE ENCOUNTER
----- Message from Ish Zhou M.D. sent at 11/14/2023  9:58 AM PST -----  Please make a follow up appointment for patient to discuss results.     11/14/2023 1140  Called pt and informed as above. Pt asked about US results, informed pt that the cyst is bigger on the most recent US. Pt agreed to make appt to discuss results. First appt with Dr Zhou is 12/26/2023. Pt would like to see her earlier if possible. Informed pt that I would message the MD to ask about an earlier appt and would call her back when I had an answer. Pt agreed and verbalized understanding.    11/15/2023 1122  Called pt and scheduled appt with Dr Zhou on 11/27/2023 at 1345 to discuss results. Verified clinic address with pt. Pt agreed and verbalized understanding.

## 2023-11-15 LAB — THYROGLOB AB SERPL-ACNC: 1.6 IU/ML (ref 0–4)

## 2023-11-21 ENCOUNTER — OFFICE VISIT (OUTPATIENT)
Dept: MEDICAL GROUP | Facility: PHYSICIAN GROUP | Age: 68
End: 2023-11-21
Payer: COMMERCIAL

## 2023-11-21 VITALS
HEIGHT: 68 IN | RESPIRATION RATE: 16 BRPM | HEART RATE: 55 BPM | OXYGEN SATURATION: 98 % | BODY MASS INDEX: 30.92 KG/M2 | WEIGHT: 204 LBS | SYSTOLIC BLOOD PRESSURE: 156 MMHG | DIASTOLIC BLOOD PRESSURE: 82 MMHG | TEMPERATURE: 98.2 F

## 2023-11-21 DIAGNOSIS — J34.89 SINUS PRESSURE: ICD-10-CM

## 2023-11-21 DIAGNOSIS — E03.9 ACQUIRED HYPOTHYROIDISM: ICD-10-CM

## 2023-11-21 DIAGNOSIS — B35.1 ONYCHOMYCOSIS: ICD-10-CM

## 2023-11-21 PROBLEM — Z09 HOSPITAL DISCHARGE FOLLOW-UP: Status: RESOLVED | Noted: 2023-03-15 | Resolved: 2023-11-21

## 2023-11-21 PROCEDURE — 3077F SYST BP >= 140 MM HG: CPT | Performed by: NURSE PRACTITIONER

## 2023-11-21 PROCEDURE — 3079F DIAST BP 80-89 MM HG: CPT | Performed by: NURSE PRACTITIONER

## 2023-11-21 PROCEDURE — 99214 OFFICE O/P EST MOD 30 MIN: CPT | Performed by: NURSE PRACTITIONER

## 2023-11-21 ASSESSMENT — FIBROSIS 4 INDEX: FIB4 SCORE: 1.18

## 2023-11-21 NOTE — ASSESSMENT & PLAN NOTE
New to examiner, chronic for patient. Patient is affected on most toes. She has not tried any home remedies or OTC treatments.

## 2023-11-21 NOTE — ASSESSMENT & PLAN NOTE
New to examiner, symptoms started for patient about 5 days ago. Reports sinus congestion, crackles in the lung, post nasal drainage, sinus congestion. Denies sore throat or ear pain, pain, fevers or chills. She has tried OTC Coricidin HBP the last three nights which helps, and Vicks vapo rub on her chest and nose.

## 2023-11-25 NOTE — PROGRESS NOTES
CC: Diagnoses of Acquired hypothyroidism, Onychomycosis, and Sinus pressure were pertinent to this visit.                                                                                                                                       HPI:   Freddy presents today with the following concerns:    Onychomycosis  New to examiner, chronic for patient. Patient is affected on most toes. She has not tried any home remedies or OTC treatments.     Sinus pressure  New to examiner, symptoms started for patient about 5 days ago. Reports sinus congestion, crackles in the lung, post nasal drainage, sinus congestion. Denies sore throat or ear pain, pain, fevers or chills. She has tried OTC Coricidin HBP the last three nights which helps, and Vicks vapo rub on her chest and nose.     Acquired hypothyroidism  Chronic, uncontrolled. Has continued levothyroxine 150 mcg/am, denies side effects of the medication. Reports that she takes the medication with coffee and other supplements.    08/03/20 09:54 09/14/21 05:16 05/02/23 08:33 11/13/23 13:27   TSH 14.000 (H) 28.500 (H) 19.000 (H) 20.900 (H)   Free T-4 1.17 0.99 0.97 0.95   T3,Free    2.66     Patient Active Problem List    Diagnosis Date Noted    Onychomycosis 11/21/2023    Sinus pressure 11/21/2023    Cyst of right ovary 09/19/2023    Vertigo 01/27/2022    Family history of cardiovascular disease 01/17/2022    History of heart murmur in childhood 09/10/2020    Thyroid nodule 10/24/2019    Lung nodule 10/24/2019    Coronary artery calcification seen on CAT scan 08/07/2019    Mixed hyperlipidemia 08/07/2019    Obesity (BMI 30-39.9) 01/09/2017    Vitamin D deficiency 01/09/2017    Generalized osteoarthritis 05/01/2014    Acquired hypothyroidism 05/01/2014     Current Outpatient Medications   Medication Sig Dispense Refill    TURMERIC PO Take  by mouth.      rosuvastatin (CRESTOR) 10 MG Tab Take 10 mg by mouth every evening.      levothyroxine (SYNTHROID) 150 MCG Tab Take 1 Tablet  "by mouth every morning on an empty stomach. 90 Tablet 3    CINNAMON PO Take  by mouth.      GUGGULIPID-BLACK PEPPER PO Take  by mouth.       No current facility-administered medications for this visit.     Allergies as of 11/21/2023    (No Known Allergies)      ROS:  See HPI    BP (!) 156/82   Pulse (!) 55   Temp 36.8 °C (98.2 °F) (Temporal)   Resp 16   Ht 1.715 m (5' 7.5\")   Wt 92.5 kg (204 lb)   LMP 07/25/2011   SpO2 98%   BMI 31.48 kg/m²     Physical Exam:  General: Normal appearing. No distress.  HEENT: PND. Normocephalic. Eyes conjunctiva clear lids without ptosis, pupils equal and reactive to light accommodation, ears normal shape and contour, canals are clear bilaterally, tympanic membranes are benign, nasal mucosa benign, oropharynx is without erythema, edema or exudates. Sinuses (frontal and maxillary) nontender to palpation.  Pulmonary: RLL crackles, otherwise clear to ausculation.  Normal effort. No rales, rhonchi, or wheezing.  Cardiovascular: Regular rate and rhythm without murmur. Carotid and radial pulses are intact and equal bilaterally.  Neurologic: Grossly nonfocal.  Lymph: No cervical, supraclavicular or axillary lymph nodes are palpable.  Skin: Warm and dry.  No obvious lesions.  Musculoskeletal: Normal gait. No extremity cyanosis, clubbing, or edema.  Psych: Normal mood and affect. Alert and oriented x3. Judgment and insight is normal.     Assessment and Plan.   68 y.o. female with the following issues:  1. Acquired hypothyroidism  Chronic, uncontrolled. Continue levothyroxine 150 mcg/day, patient usually gets up around 3 am because her  gets up, she plans to take her medication at that time and then return to sleep so her coffee and supplements do not interfere with medication. Plan to repeat thyroid labs in February 2024 and follow up in clinic to review.   - T3 FREE; Future  - FREE THYROXINE; Future  - TSH; Future    2. Onychomycosis  New to examiner, chronic for patient. " Referral to podiatry for evaluation and treatment.   - Referral to Podiatry    3. Sinus pressure  New to examiner, ongoing for 5 days. Discussed with patient symptoms appear to be consistent with allergies, patient agrees. Plan to continue to monitor, use OTC nasal saline spray or angus pot. Patient will notify PCP through MyChart if symptoms worsen or if she develops fevers or chills.      Return in about 2 months (around 2/5/2024) for Thyroid, Follow up Labs.     Please note that this dictation was created using voice recognition software. I have worked with consultants from the vendor as well as technical experts from [facility] to optimize the interface. I have made every reasonable attempt to correct obvious errors, but I expect that there are errors of grammar and possibly content that I did not discover before finalizing the note.

## 2023-11-25 NOTE — ASSESSMENT & PLAN NOTE
Chronic, uncontrolled. Has continued levothyroxine 150 mcg/am, denies side effects of the medication. Reports that she takes the medication with coffee and other supplements.    08/03/20 09:54 09/14/21 05:16 05/02/23 08:33 11/13/23 13:27   TSH 14.000 (H) 28.500 (H) 19.000 (H) 20.900 (H)   Free T-4 1.17 0.99 0.97 0.95   T3,Free    2.66

## 2023-11-27 ENCOUNTER — GYNECOLOGY VISIT (OUTPATIENT)
Dept: OBGYN | Facility: CLINIC | Age: 68
End: 2023-11-27
Payer: COMMERCIAL

## 2023-11-27 VITALS — DIASTOLIC BLOOD PRESSURE: 89 MMHG | SYSTOLIC BLOOD PRESSURE: 150 MMHG | WEIGHT: 205 LBS | BODY MASS INDEX: 31.63 KG/M2

## 2023-11-27 DIAGNOSIS — N83.202 CYST OF LEFT OVARY: ICD-10-CM

## 2023-11-27 PROCEDURE — 99214 OFFICE O/P EST MOD 30 MIN: CPT | Performed by: OBSTETRICS & GYNECOLOGY

## 2023-11-27 PROCEDURE — 3077F SYST BP >= 140 MM HG: CPT | Performed by: OBSTETRICS & GYNECOLOGY

## 2023-11-27 PROCEDURE — 3079F DIAST BP 80-89 MM HG: CPT | Performed by: OBSTETRICS & GYNECOLOGY

## 2023-11-27 ASSESSMENT — FIBROSIS 4 INDEX: FIB4 SCORE: 1.18

## 2023-11-27 NOTE — PROGRESS NOTES
Patient is here for a GYN Visit- Discuss US Results. Her Weight is 205 lb and BP is  . She would like to know if she is getting surgery or not.

## 2023-11-27 NOTE — Clinical Note
Plan: Operative laparoscopy, drainage and excision of right ovarian cyst, possible unilateral oophorectomy, possible unilateral salpingectomy, possible laparotomy, and any other indicated procedures.  Surgeon: Me Assist: Yes, and requesting Dr. Víctor King to assist due to multiple hernia repairs and mesh in abdomen wall.  Duration: 2 hours.  Outpatient.  Needs PCP clearance.  When: asap depending on Dr. King's schedule

## 2023-11-27 NOTE — PROGRESS NOTES
GYN FOLLOW UP VISIT    CC:  Gynecologic Exam       HPI: Patient is a 68 y.o.  who presents for follow up for right ovarian cyst. She denies any pelvic pain, bloating or appetite changes.        ROS:   General: denies fever / chills  HEENT: denies sore throat:  CV: denies chest pain:  Repiratory: denies shortness of breath  GI: denies abdominal pain  : denies dysuria:    PFSH:  I personally reviewed the past medical and surgical histories.       PHYSICAL EXAMINATION:  Vital Signs:   Vitals:    23 1325   BP: (!) 150/89   BP Location: Left arm   Patient Position: Sitting   BP Cuff Size: Adult   Weight: 205 lb     Body mass index is 31.63 kg/m².    Gen: appears well, NAD  Exam deferred    Imagin2023 1:00 PM     HISTORY/REASON FOR EXAM:  8 cm right ovarian cyst follow up  Follow-up right ovarian cyst     TECHNIQUE/EXAM DESCRIPTION:  Transabdominal and transvaginal pelvic ultrasound.     COMPARISON:   2023     FINDINGS:  Both transabdominal and transvaginal scanning were performed to optimally visualize the pelvis.     UTERUS:  The uterus measures 2.90 cm x 5.48 cm x 3.76 cm.  The uterine myometrium is within normal limits.  The endometrial echo complex measures 0.57 cm.  The endometrium is unremarkable in appearance and thickness for age and menstrual status.        OVARIES:  The right ovary measures 9.80 cm x 7.89 cm x 7.78 cm. Duplex Doppler examination of the right ovary shows normal waveforms. Cyst in the right ovary measures 9.8 x 7.2 x 6.8 cm. No solid component or internal vascularity is present.     The left ovary is not visualized due to overlying bowel gas.           There is no free fluid seen.     IMPRESSION:     1.  Some interval increase in simple appearing right ovarian cyst now measuring 9.8 x 7.2 x 6.8 cm. Prior exam was 8.4 x 7.0 x 5.8 cm. No solid component is appreciated. Differential diagnosis includes benign ovarian cyst or cystadenoma versus    cystadenocarcinoma.    ASSESSMENT AND PLAN:  68 y.o.  with asymptomatic right ovarian cyst, measuring 9.8 x 7.2 x 6.8 cm. She would like to have this removed. Laparoscopic surgery discussed. Due to her extensive ventral hernia repair and mesh in abdominal wall, would be optimal to have general surgeon assist in getting into abdomen safely. Will request Dr. King to assist.     Risks, benefits of surgery briefly discussed. Recovery and precautions post surgery discussed. She states she has some travel plans in early upcoming year. Advised she should not travel for at least 4 weeks post surgery.     Repeating ca125 now to reassure it is benign and scheduling of surgery to not be rushed.     Request placed for Operative laparoscopy, drainage and excision of right ovarian cyst, possible unilateral oophorectomy, possible unilateral salpingectomy, possible laparotomy, and any other indicated procedures. Will need clearance for surgery from PCP.     RTC for preop.       Time spent: 30 minutes    Ish Zhou M.D.

## 2023-11-28 ENCOUNTER — HOSPITAL ENCOUNTER (OUTPATIENT)
Dept: CARDIOLOGY | Facility: MEDICAL CENTER | Age: 68
End: 2023-11-28
Attending: INTERNAL MEDICINE
Payer: COMMERCIAL

## 2023-11-28 DIAGNOSIS — R94.31 ABNORMAL EKG: ICD-10-CM

## 2023-11-28 PROCEDURE — 93306 TTE W/DOPPLER COMPLETE: CPT

## 2023-12-01 LAB
LV EJECT FRACT  99904: 66
LV EJECT FRACT MOD 2C 99903: 63.85
LV EJECT FRACT MOD 4C 99902: 66.43
LV EJECT FRACT MOD BP 99901: 65.86

## 2023-12-01 PROCEDURE — 93306 TTE W/DOPPLER COMPLETE: CPT | Mod: 26 | Performed by: INTERNAL MEDICINE

## 2023-12-30 NOTE — RESULT ENCOUNTER NOTE
"Called and informed the patient about the results of her echocardiogram which is normal  Her lipid panel is now normal for the first time over 10 years on rosuvastatin 10 mg  She had to stop aspirin 81 mg daily due to \"eye floaters\" requested by her ophthalmologist though she did notice that it helped her arthritis."

## 2024-01-02 ENCOUNTER — OFFICE VISIT (OUTPATIENT)
Dept: MEDICAL GROUP | Facility: PHYSICIAN GROUP | Age: 69
End: 2024-01-02
Payer: COMMERCIAL

## 2024-01-02 VITALS
SYSTOLIC BLOOD PRESSURE: 102 MMHG | DIASTOLIC BLOOD PRESSURE: 72 MMHG | OXYGEN SATURATION: 98 % | WEIGHT: 208 LBS | BODY MASS INDEX: 31.52 KG/M2 | RESPIRATION RATE: 19 BRPM | HEIGHT: 68 IN | TEMPERATURE: 98.4 F | HEART RATE: 67 BPM

## 2024-01-02 DIAGNOSIS — Z01.818 PREOP EXAMINATION: ICD-10-CM

## 2024-01-02 DIAGNOSIS — N83.201 CYST OF RIGHT OVARY: ICD-10-CM

## 2024-01-02 PROCEDURE — 3078F DIAST BP <80 MM HG: CPT | Performed by: NURSE PRACTITIONER

## 2024-01-02 PROCEDURE — 99213 OFFICE O/P EST LOW 20 MIN: CPT | Performed by: NURSE PRACTITIONER

## 2024-01-02 PROCEDURE — 3074F SYST BP LT 130 MM HG: CPT | Performed by: NURSE PRACTITIONER

## 2024-01-02 ASSESSMENT — PATIENT HEALTH QUESTIONNAIRE - PHQ9: CLINICAL INTERPRETATION OF PHQ2 SCORE: 0

## 2024-01-02 ASSESSMENT — FIBROSIS 4 INDEX: FIB4 SCORE: 1.18

## 2024-01-02 NOTE — ASSESSMENT & PLAN NOTE
Fortino confirms no heart attack  Cholesterol better with statin, also using vegan diet    Vacation then back in 2 weeks  2/7/24 surgery plan  Will try to get philip in on the surgery so that the mesh is not disturbed    Eye floaters- treated a couple of weeks ago    Hernia repair in the past - dr palacios did a tummy tuck for incisional hernia repair    Now right ovary cyst, doesn't think it will be cancer, but it is growing

## 2024-01-02 NOTE — LETTER
PROCEDURE/SURGERY CLEARANCE FORM      Encounter Date: 1/2/2024    Patient: Freddy Smith  YOB: 1955    CARDIOLOGIST:  LUIS A Sawant    REFERRING DOCTOR:  Dr. Ish Zhou    The above patient is cleared to have the following procedure/surgery:   Operative laparoscopy, drainage and excision of right ovarian cyst, possible unilateral oophorectomy, possible unilateral salpingectomy, possible laparotomy, and any other indicated procedures.                                                            APRN Signature LUIS A Sawant

## 2024-01-02 NOTE — ASSESSMENT & PLAN NOTE
Chronic, ongoing.  The patient is now following with gynecology, Dr. Ish Zhou, and is tentatively scheduled for surgery on 2/7/2024.  Due to the patient's history of multiple incisional hernia repairs using mesh, it will be necessary for Dr. King to be present during the surgery so that her mesh is not disturbed.  Reports that she was told her right ovarian cyst does not look like it is cancer, but it does continue to grow, planned procedure operative laparoscopic, drainage and excision of right ovarian cyst, possible unilateral oophorectomy, possible unilateral salpingectomy, possible laparotomy, and any other indicated procedures.  She has already followed with cardiology.

## 2024-01-02 NOTE — LETTER
PROCEDURE/SURGERY CLEARANCE FORM      Encounter Date: 1/2/2024    Patient: Freddy Smith  YOB: 1955    PCP:  LUIS A Sawant    REFERRING DOCTOR:  Dr. Ish Zhou      The above patient is cleared to have the following procedure/surgery:     Operative laparoscopy, drainage and excision of right ovarian cyst, possible unilateral oophorectomy, possible unilateral salpingectomy, possible laparotomy, and any other indicated procedures.                                                          APRN Signature LUIS A Sawant

## 2024-01-03 NOTE — PROGRESS NOTES
CC: Diagnoses of Preop examination and Cyst of right ovary were pertinent to this visit.                                                                                                                                       HPI:   Freddy presents today with the following concerns:    Cyst of right ovary  Chronic, ongoing.  The patient is now following with gynecology, Dr. Ish Zhou, and is tentatively scheduled for surgery on 2/7/2024.  Due to the patient's history of multiple incisional hernia repairs using mesh, it will be necessary for Dr. King to be present during the surgery so that her mesh is not disturbed.  Reports that she was told her right ovarian cyst does not look like it is cancer, but it does continue to grow, planned procedure operative laparoscopic, drainage and excision of right ovarian cyst, possible unilateral oophorectomy, possible unilateral salpingectomy, possible laparotomy, and any other indicated procedures.  She has already followed with cardiology.    Patient Active Problem List    Diagnosis Date Noted    Onychomycosis 11/21/2023    Sinus pressure 11/21/2023    Cyst of right ovary 09/19/2023    Vertigo 01/27/2022    Family history of cardiovascular disease 01/17/2022    History of heart murmur in childhood 09/10/2020    Thyroid nodule 10/24/2019    Lung nodule 10/24/2019    Coronary artery calcification seen on CAT scan 08/07/2019    Mixed hyperlipidemia 08/07/2019    Obesity (BMI 30-39.9) 01/09/2017    Vitamin D deficiency 01/09/2017    Generalized osteoarthritis 05/01/2014    Acquired hypothyroidism 05/01/2014     Current Outpatient Medications   Medication Sig Dispense Refill    TURMERIC PO Take  by mouth.      rosuvastatin (CRESTOR) 10 MG Tab Take 10 mg by mouth every evening.      levothyroxine (SYNTHROID) 150 MCG Tab Take 1 Tablet by mouth every morning on an empty stomach. 90 Tablet 3    CINNAMON PO Take  by mouth.      GUGGULIPID-BLACK PEPPER PO Take  by mouth.       No current  "facility-administered medications for this visit.     Allergies as of 01/02/2024    (No Known Allergies)      ROS:  Constitutional: No fevers, chills, malaise/fatigue.  Eyes: No eye pain.  ENT: No sore throat, congestion.   Resp: No cough, shortness of breath.  CV: No chest pain, leg swelling, palpitations.  GI: No nausea/vomiting, abdominal pain, constipation, diarrhea.  : No dysuria, hematuria.  MSK: No weakness.  Skin: No rashes.  Neuro: No dizziness, weakness, headaches.  Psych: No suicidal ideations.    All remaining systems reviewed and found to be negative, except as stated above.      /72 (BP Location: Left arm, Patient Position: Sitting, BP Cuff Size: Large adult)   Pulse 67   Temp 36.9 °C (98.4 °F) (Temporal)   Resp 19   Ht 1.715 m (5' 7.5\")   Wt 94.3 kg (208 lb)   LMP 07/25/2011   SpO2 98%   BMI 32.10 kg/m²     Physical Exam:  General: Well nourished, well developed female in NAD, awake and conversant.  Eyes: Normal conjunctiva, anicteric.  Round symmetrical pupils.  ENT: Hearing grossly intact.  No nasal discharge.  Neck: Neck is supple.  No masses or thyromegaly.  CV: No lower extremity edema.  Respiratory: Respirations are nonlabored.  No wheezing.  Abdomen: Non-Distended.  Skin: Warm.  No rashes or ulcers.  MSK: Normal ambulation.  No clubbing or cyanosis.  Neuro: Sensation and CN II-XII grossly normal.  Psych: Alert and oriented.  Cooperative, appropriate mood and affect, normal judgment.      Assessment and Plan.   68 y.o. female with the following issues:  1. Preop examination  2. Cyst of right ovary  Chronic, ongoing.  Continue to follow with gynecology, Dr. Ish Zhou.  Tentatively scheduled for surgery on 2/7/2024 pending scheduling to include Dr. King.  Patient has already followed with cardiology.  Clearance letter provided to patient for surgery.    Return for Follow up Labs, Thyroid.     Please note that this dictation was created using voice recognition software. I have " worked with consultants from the vendor as well as technical experts from [facility] to optimize the interface. I have made every reasonable attempt to correct obvious errors, but I expect that there are errors of grammar and possibly content that I did not discover before finalizing the note.

## 2024-01-26 ENCOUNTER — HOSPITAL ENCOUNTER (OUTPATIENT)
Dept: LAB | Facility: MEDICAL CENTER | Age: 69
End: 2024-01-26
Attending: OBSTETRICS & GYNECOLOGY
Payer: COMMERCIAL

## 2024-01-26 ENCOUNTER — HOSPITAL ENCOUNTER (OUTPATIENT)
Dept: LAB | Facility: MEDICAL CENTER | Age: 69
End: 2024-01-26
Attending: NURSE PRACTITIONER
Payer: COMMERCIAL

## 2024-01-26 DIAGNOSIS — E03.9 ACQUIRED HYPOTHYROIDISM: ICD-10-CM

## 2024-01-26 DIAGNOSIS — N83.202 CYST OF LEFT OVARY: ICD-10-CM

## 2024-01-26 LAB
CANCER AG125 SERPL-ACNC: 18.4 U/ML (ref 0–35)
T3FREE SERPL-MCNC: 3.07 PG/ML (ref 2–4.4)
T4 FREE SERPL-MCNC: 1.61 NG/DL (ref 0.93–1.7)
TSH SERPL DL<=0.005 MIU/L-ACNC: 0.87 UIU/ML (ref 0.38–5.33)

## 2024-01-26 PROCEDURE — 84481 FREE ASSAY (FT-3): CPT

## 2024-01-26 PROCEDURE — 84443 ASSAY THYROID STIM HORMONE: CPT

## 2024-01-26 PROCEDURE — 86304 IMMUNOASSAY TUMOR CA 125: CPT

## 2024-01-26 PROCEDURE — 84439 ASSAY OF FREE THYROXINE: CPT

## 2024-01-26 PROCEDURE — 36415 COLL VENOUS BLD VENIPUNCTURE: CPT

## 2024-02-02 ENCOUNTER — APPOINTMENT (OUTPATIENT)
Dept: ADMISSIONS | Facility: MEDICAL CENTER | Age: 69
End: 2024-02-02
Attending: OBSTETRICS & GYNECOLOGY
Payer: COMMERCIAL

## 2024-02-15 ENCOUNTER — PRE-ADMISSION TESTING (OUTPATIENT)
Dept: ADMISSIONS | Facility: MEDICAL CENTER | Age: 69
End: 2024-02-15
Attending: OBSTETRICS & GYNECOLOGY
Payer: COMMERCIAL

## 2024-02-20 LAB
ABO + RH BLD: NORMAL
ABO GROUP BLD: NORMAL
ALBUMIN SERPL BCP-MCNC: 3.9 G/DL (ref 3.2–4.9)
ALBUMIN/GLOB SERPL: 1.3 G/DL
ALP SERPL-CCNC: 112 U/L (ref 30–99)
ALT SERPL-CCNC: 18 U/L (ref 2–50)
ANION GAP SERPL CALC-SCNC: 12 MMOL/L (ref 7–16)
APPEARANCE UR: CLEAR
AST SERPL-CCNC: 19 U/L (ref 12–45)
BACTERIA #/AREA URNS HPF: NEGATIVE /HPF
BASOPHILS # BLD AUTO: 0.4 % (ref 0–1.8)
BASOPHILS # BLD: 0.02 K/UL (ref 0–0.12)
BILIRUB SERPL-MCNC: 0.3 MG/DL (ref 0.1–1.5)
BILIRUB UR QL STRIP.AUTO: NEGATIVE
BLD GP AB SCN SERPL QL: NORMAL
BUN SERPL-MCNC: 13 MG/DL (ref 8–22)
CALCIUM ALBUM COR SERPL-MCNC: 9.1 MG/DL (ref 8.5–10.5)
CALCIUM SERPL-MCNC: 9 MG/DL (ref 8.5–10.5)
CHLORIDE SERPL-SCNC: 105 MMOL/L (ref 96–112)
CO2 SERPL-SCNC: 26 MMOL/L (ref 20–33)
COLOR UR: YELLOW
CREAT SERPL-MCNC: 0.71 MG/DL (ref 0.5–1.4)
EKG IMPRESSION: NORMAL
EOSINOPHIL # BLD AUTO: 0.1 K/UL (ref 0–0.51)
EOSINOPHIL NFR BLD: 2.2 % (ref 0–6.9)
EPI CELLS #/AREA URNS HPF: ABNORMAL /HPF
ERYTHROCYTE [DISTWIDTH] IN BLOOD BY AUTOMATED COUNT: 43.3 FL (ref 35.9–50)
GFR SERPLBLD CREATININE-BSD FMLA CKD-EPI: 93 ML/MIN/1.73 M 2
GLOBULIN SER CALC-MCNC: 2.9 G/DL (ref 1.9–3.5)
GLUCOSE SERPL-MCNC: 111 MG/DL (ref 65–99)
GLUCOSE UR STRIP.AUTO-MCNC: NEGATIVE MG/DL
HCT VFR BLD AUTO: 42.1 % (ref 37–47)
HGB BLD-MCNC: 14.2 G/DL (ref 12–16)
HYALINE CASTS #/AREA URNS LPF: ABNORMAL /LPF
IMM GRANULOCYTES # BLD AUTO: 0.01 K/UL (ref 0–0.11)
IMM GRANULOCYTES NFR BLD AUTO: 0.2 % (ref 0–0.9)
KETONES UR STRIP.AUTO-MCNC: ABNORMAL MG/DL
LEUKOCYTE ESTERASE UR QL STRIP.AUTO: ABNORMAL
LYMPHOCYTES # BLD AUTO: 1.02 K/UL (ref 1–4.8)
LYMPHOCYTES NFR BLD: 22.3 % (ref 22–41)
MCH RBC QN AUTO: 32.1 PG (ref 27–33)
MCHC RBC AUTO-ENTMCNC: 33.7 G/DL (ref 32.2–35.5)
MCV RBC AUTO: 95 FL (ref 81.4–97.8)
MICRO URNS: ABNORMAL
MONOCYTES # BLD AUTO: 0.35 K/UL (ref 0–0.85)
MONOCYTES NFR BLD AUTO: 7.6 % (ref 0–13.4)
NEUTROPHILS # BLD AUTO: 3.08 K/UL (ref 1.82–7.42)
NEUTROPHILS NFR BLD: 67.3 % (ref 44–72)
NITRITE UR QL STRIP.AUTO: NEGATIVE
NRBC # BLD AUTO: 0 K/UL
NRBC BLD-RTO: 0 /100 WBC (ref 0–0.2)
PH UR STRIP.AUTO: 6 [PH] (ref 5–8)
PLATELET # BLD AUTO: 348 K/UL (ref 164–446)
PMV BLD AUTO: 9.5 FL (ref 9–12.9)
POTASSIUM SERPL-SCNC: 4 MMOL/L (ref 3.6–5.5)
PROT SERPL-MCNC: 6.8 G/DL (ref 6–8.2)
PROT UR QL STRIP: NEGATIVE MG/DL
RBC # BLD AUTO: 4.43 M/UL (ref 4.2–5.4)
RBC # URNS HPF: ABNORMAL /HPF
RBC UR QL AUTO: ABNORMAL
RH BLD: NORMAL
SODIUM SERPL-SCNC: 143 MMOL/L (ref 135–145)
SP GR UR STRIP.AUTO: 1.03
UROBILINOGEN UR STRIP.AUTO-MCNC: 1 MG/DL
WBC # BLD AUTO: 4.6 K/UL (ref 4.8–10.8)
WBC #/AREA URNS HPF: ABNORMAL /HPF

## 2024-02-20 PROCEDURE — 86850 RBC ANTIBODY SCREEN: CPT

## 2024-02-20 PROCEDURE — 81001 URINALYSIS AUTO W/SCOPE: CPT

## 2024-02-20 PROCEDURE — 85025 COMPLETE CBC W/AUTO DIFF WBC: CPT

## 2024-02-20 PROCEDURE — 80053 COMPREHEN METABOLIC PANEL: CPT

## 2024-02-20 PROCEDURE — 86900 BLOOD TYPING SEROLOGIC ABO: CPT

## 2024-02-20 PROCEDURE — 86901 BLOOD TYPING SEROLOGIC RH(D): CPT

## 2024-02-20 PROCEDURE — 36415 COLL VENOUS BLD VENIPUNCTURE: CPT

## 2024-02-20 PROCEDURE — 93010 ELECTROCARDIOGRAM REPORT: CPT | Performed by: INTERNAL MEDICINE

## 2024-02-21 ENCOUNTER — APPOINTMENT (OUTPATIENT)
Dept: ADMISSIONS | Facility: MEDICAL CENTER | Age: 69
End: 2024-02-21
Attending: OBSTETRICS & GYNECOLOGY
Payer: COMMERCIAL

## 2024-02-21 DIAGNOSIS — Z01.810 PRE-OPERATIVE CARDIOVASCULAR EXAMINATION: ICD-10-CM

## 2024-02-21 DIAGNOSIS — Z01.812 PRE-OPERATIVE LABORATORY EXAMINATION: ICD-10-CM

## 2024-02-21 PROCEDURE — 93005 ELECTROCARDIOGRAM TRACING: CPT

## 2024-03-08 ENCOUNTER — TELEPHONE (OUTPATIENT)
Dept: CARDIOLOGY | Facility: MEDICAL CENTER | Age: 69
End: 2024-03-08

## 2024-03-08 ENCOUNTER — GYNECOLOGY VISIT (OUTPATIENT)
Dept: OBGYN | Facility: CLINIC | Age: 69
End: 2024-03-08
Payer: COMMERCIAL

## 2024-03-08 VITALS — SYSTOLIC BLOOD PRESSURE: 143 MMHG | DIASTOLIC BLOOD PRESSURE: 82 MMHG | WEIGHT: 208 LBS | BODY MASS INDEX: 32.1 KG/M2

## 2024-03-08 DIAGNOSIS — Z01.818 PREOPERATIVE EXAM FOR GYNECOLOGIC SURGERY: ICD-10-CM

## 2024-03-08 DIAGNOSIS — N83.201 CYST OF RIGHT OVARY: ICD-10-CM

## 2024-03-08 PROCEDURE — 99999 PR NO CHARGE: CPT | Performed by: OBSTETRICS & GYNECOLOGY

## 2024-03-08 ASSESSMENT — FIBROSIS 4 INDEX: FIB4 SCORE: 0.85

## 2024-03-08 NOTE — PROGRESS NOTES
Pt here for Pre-Op visit on 3/13/24  OPERATIVE LAPAROSCOPY, DRAINAGE AND EXCISION OF RIGHT OVARIAN CYST,   POSSIBLE RIGHT OOPHORECTOMY, POSSIBLE LEFT OOPHORECTOMY, POSSIBLE RIGHT SALPINGECTOMY, POSSIBLE LEFT SALPINGECTOMY, POSSIBLE LAPAROTOMY     Good phone#:[phone]  Pharmacy verified.  Pt states no complaints for today.

## 2024-03-08 NOTE — TELEPHONE ENCOUNTER
Pt is having a surgery on 03/13/2024 and would like to know if she needs Surgery Clearance. She said the performing Surgeons off said that had been in contact with Cardiology and SW. Pt is requesting a phone call. I have scanned the Walk  in form  into Media in addition to a visit detail sheet from the Surgeons  office under Correspondence.

## 2024-03-08 NOTE — PROGRESS NOTES
History and Physical    Freddy Smith    68 y.o.    Chief complaint    Chief Complaint   Patient presents with    Gynecologic Exam     Pre Op       HPI    Patient is a 69 yo  who presents for preoperative preparation for her surgery in the setting of a persistently enlarged right ovarian cyst.     It was first diagnosed about 1 year ago she had an ER visit in 2023 for chest pain and for unclear reasons she also underwent CT of the abdomen and pelvis which revealed an incidental 8 cm right ovarian cyst. She had no pelvic pain or any other pelvic sympomatology. She had a pelvic ultrasound in ER at that time which showed an 8.4 cm thin walled right ovarian cyst.   She reports that in her 3rd pregnancy in  she had an ovarian cyst removal during her pregnancy, and at that time she thought that her ovary was removed.   She also reports she has had multiple abdominal hernia repairs as well as an abdominoplasty by general surgeon here in town. She reports she has mesh spanning the entirety of her abdomen.   She had been evaluated by a community gynecologist who is no longer practicing.   Was told she still had an ovarian cyst at her last visit in their office in July but did not have any formal follow up repeat ultrasounds. Tumor markers were also normal.   She was seen in our office subsequently in November and due to patient not having any symptoms of pain, bloating, pressure, changes in bowel/bladder, early satiety, changes in appetite or weight, collaborative decision was made with patient to conservatively watch the cyst and repeat imaging to see if it was still persisting.   Repeat pelvic US on 23 showed interval increase in the simple right ovarian cyst measuring 9.8 x 7.2 x 6.8 cm.   At this point, she desired surgery to remove the cyst.   Due to her extensive ventral hernia repairs and mesh in abdominal wall, would be optimal to have general surgeon assist in getting into abdomen  safely.     Consented patient for operative laparoscopy, drainage and excision of right ovarian cyst, possible unilateral oophorectomy, possible unilateral salpingectomy, possible laparotomy, and any other indicated procedures     Risks of surgery discussed with patient, including, but not limited to, bleeding, infection, damage to other organs such as bowel, bladder, ureters, and nerves, need for reoperation, need for blood transfusion if indicated. Patient understands all risks and all questions answered. Consents to be signed.     She has been cleared by PCP and cardiology. Awaiting official cardiology note for surgical clearance.     Review of Systems:  Review of Systems   All other systems reviewed and are negative.       Past Obstetrical History:     x 4    Past Gynecological History:    Last pap:  NILM per records  H/o abnormal pap: No  H/o STIs: No  DEXA: 23 normal  Last Mammogram: 23 normal  LMP: age 57  BCM: Postmenopause    Past Medical History    Past Medical History:   Diagnosis Date    Arthritis     Elevated fasting glucose 2017    Gynecological disorder 02/15/2024    Heart murmur     was told this at some point in the past    High cholesterol     Hyperlipidemia     Postmenopausal     Right foot pain 2018    Unspecified disorder of thyroid     Vitamin D deficiency        Past Surgical History    Past Surgical History:   Procedure Laterality Date    VENTRAL HERNIA REPAIR  2011    Performed by SAVANAH QUIÑONES at SURGERY [facility] ORS    PANNICULECTOMY  2011    Performed by SAVANAH QUIÑONES at SURGERY [facility] ORS    OTHER ABDOMINAL SURGERY       incisional hernia repair       Family History   Problem Relation Age of Onset    Heart Disease Mother     Cancer Mother         breast    Diabetes Mother     Heart Attack Mother     Other Father         AAA    No Known Problems Sister     Alcohol/Drug Brother     Alcohol/Drug Brother     No Known Problems  Sister     Other Paternal Aunt         MS    No Known Problems Maternal Grandmother     No Known Problems Maternal Grandfather     Heart Attack Paternal Grandmother         Thoracic Aneurysm    Other Paternal Grandfather         TB       Allergies    No Known Allergies    Medications    Current Outpatient Medications   Medication Sig Dispense Refill    levothyroxine (SYNTHROID) 150 MCG Tab Take 1 Tablet by mouth every morning on an empty stomach. 90 Tablet 3    TURMERIC PO Take  by mouth.      rosuvastatin (CRESTOR) 10 MG Tab Take 10 mg by mouth every evening.      CINNAMON PO Take  by mouth.      GUGGULIPID-BLACK PEPPER PO Take  by mouth.       No current facility-administered medications for this visit.       Social  Social History     Tobacco Use    Smoking status: Never     Passive exposure: Past    Smokeless tobacco: Never   Vaping Use    Vaping Use: Never used   Substance Use Topics    Alcohol use: No    Drug use: Not Currently     Comment: maritza 1960s        OBJECTIVE:    Vitals    BP (!) 143/82   Wt 208 lb   LMP 07/25/2011   BMI 32.10 kg/m²     Physical Exam    GENERAL: Well developed, well nourished, female in no acute distress.    HEENT: NCAT, mucus membranes moist    Neck: Supple, nontender, no BREANNA, no thyromegaly    CV: RRR    Pulm: CTAB    Abdomen: Soft ND NT.    Ext: ACEVEDO    :Deferred    Labs/Pathology:     Latest Reference Range & Units 02/20/24 13:42   WBC 4.8 - 10.8 K/uL 4.6 (L)   RBC 4.20 - 5.40 M/uL 4.43   Hemoglobin 12.0 - 16.0 g/dL 14.2   Hematocrit 37.0 - 47.0 % 42.1   MCV 81.4 - 97.8 fL 95.0   MCH 27.0 - 33.0 pg 32.1   MCHC 32.2 - 35.5 g/dL 33.7   RDW 35.9 - 50.0 fL 43.3   Platelet Count 164 - 446 K/uL 348   (L): Data is abnormally low   Latest Reference Range & Units 02/20/24 13:42   Sodium 135 - 145 mmol/L 143   Potassium 3.6 - 5.5 mmol/L 4.0   Chloride 96 - 112 mmol/L 105   Co2 20 - 33 mmol/L 26   Anion Gap 7.0 - 16.0  12.0   Glucose 65 - 99 mg/dL 111 (H)   Bun 8 - 22 mg/dL 13    Creatinine 0.50 - 1.40 mg/dL 0.71   GFR (CKD-EPI) >60 mL/min/1.73 m 2 93   Calcium 8.5 - 10.5 mg/dL 9.0   Correct Calcium 8.5 - 10.5 mg/dL 9.1   AST(SGOT) 12 - 45 U/L 19   ALT(SGPT) 2 - 50 U/L 18   (H): Data is abnormally high   Latest Reference Range & Units 24 13:42   Color  Yellow   Character  Clear   Specific Gravity <1.035  1.026   Ph 5.0 - 8.0  6.0   Glucose Negative mg/dL Negative   Ketones Negative mg/dL Trace !   Bilirubin Negative  Negative   Occult Blood Negative  Moderate !   Protein Negative mg/dL Negative   Nitrite Negative  Negative   Leukocyte Esterase Negative  Small !   Urobilinogen, Urine Negative  1.0   Micro Urine Req  Microscopic   WBC /hpf 2-5   RBC /hpf 20-50 !   Epithelial Cells /hpf Few   Bacteria None /hpf Negative   Hyaline Cast /lpf 0-2   !: Data is abnormal     Latest Reference Range & Units 23 11:34 24 07:44   Ca 125 0.0 - 35.0 U/mL 16.2 18.4      24 13:42   ABO Rh Confirm A POS   ABO Grouping Only A   Rh Grouping Only POS   Antibody Screen Scrn NEG       Imagin2023 1:00 PM     HISTORY/REASON FOR EXAM:  8 cm right ovarian cyst follow up  Follow-up right ovarian cyst     TECHNIQUE/EXAM DESCRIPTION:  Transabdominal and transvaginal pelvic ultrasound.     COMPARISON:   2023     FINDINGS:  Both transabdominal and transvaginal scanning were performed to optimally visualize the pelvis.     UTERUS:  The uterus measures 2.90 cm x 5.48 cm x 3.76 cm.  The uterine myometrium is within normal limits.  The endometrial echo complex measures 0.57 cm.  The endometrium is unremarkable in appearance and thickness for age and menstrual status.        OVARIES:  The right ovary measures 9.80 cm x 7.89 cm x 7.78 cm. Duplex Doppler examination of the right ovary shows normal waveforms. Cyst in the right ovary measures 9.8 x 7.2 x 6.8 cm. No solid component or internal vascularity is present.     The left ovary is not visualized due to overlying bowel gas.            There is no free fluid seen.     IMPRESSION:     1.  Some interval increase in simple appearing right ovarian cyst now measuring 9.8 x 7.2 x 6.8 cm. Prior exam was 8.4 x 7.0 x 5.8 cm. No solid component is appreciated. Differential diagnosis includes benign ovarian cyst or cystadenoma versus   cystadenocarcinoma.    3/9/2023 9:07 AM PELVIC US     HISTORY/REASON FOR EXAM:  Pain; evaluate right ovarian cyst        TECHNIQUE/EXAM DESCRIPTION:  Transabdominal and transvaginal pelvic ultrasound.     COMPARISON:   Renal colic CT 3/9/2023     FINDINGS:  Both transabdominal and transvaginal scanning were performed to optimally visualize the pelvis.     UTERUS:  The uterus measures 3.41 cm x 6.87 cm x 3.80 cm.  The uterine myometrium is within normal limits.  The endometrial echo complex measures 0.30 cm.  The endometrium is unremarkable in appearance and thickness for age and menstrual status.        OVARIES:  The right ovary measures 8.95 cm x 6.30 cm x 7.23 cm. Duplex Doppler examination of the right ovary shows normal waveforms. There is an 8.4 x 7.0 x 5.8 cm thin-walled right ovarian cyst.     The left ovary is not visualized secondary to overlying bowel gas     There is no free fluid seen.     IMPRESSION:     1.  8.4 cm right ovarian cyst. Recommend GYN consult and follow-up ultrasound in one month.    12/1/23 ECHO  CONCLUSIONS  Normal left ventricular systolic function.  The left ventricular ejection fraction is visually estimated to be   6570%.  Normal right ventricular size and systolic function.  Right heart pressures are normal.  No valvular abnormalities.   No prior study is available for comparison.      A/P    Patient Active Problem List   Diagnosis    Generalized osteoarthritis    Acquired hypothyroidism    Obesity (BMI 30-39.9)    Vitamin D deficiency    Coronary artery calcification seen on CAT scan    Mixed hyperlipidemia    Thyroid nodule    Lung nodule    History of heart murmur in childhood    Family  history of cardiovascular disease    Vertigo    Cyst of right ovary    Onychomycosis    Sinus pressure       Freddy Smith    68 y.o.  with persistent and enlarging asymptomatic simple right ovarian cyst here for surgical management.     1. Preoperative exam for gynecologic surgery    2. Cyst of right ovary    Risks, benefits of surgery discussed as above. Pre and postop course discussed. Postop limitation and precautions given. All questions and concerns answered. She verbalized understanding of the procedure.  Plan to do procedure with assistance of general surgeon.   Preop labs ordered, reviewed.   PCP clearance noted, awaiting cardiology clearance note.   NPO 8h prior to surgery.   Advised to avoid NSAIDS 7 days prior to surgery.   Preop antibiotics - none indicated.   Notify anesthesia.     To OR for OPERATIVE LAPAROSCOPY, DRAINAGE AND EXCISION OF RIGHT OVARIAN CYST, POSSIBLE RIGHT OOPHORECTOMY, POSSIBLE LEFT OOPHORECTOMY, POSSIBLE RIGHT SALPINGECTOMY, POSSIBLE LEFT SALPINGECTOMY, POSSIBLE LAPAROTOMY.       Time spent: 30 minutes        Ish Zhou M.D.    Obstetrics and Gynecology    3/8/28169:49 AM

## 2024-03-08 NOTE — LETTER
PROCEDURE/SURGERY CLEARANCE FORM    Date: 3/8/2024   Patient Name: Freddy Smith    Dear Surgeon or Proceduralist,      Thank you for your request for cardiac stratification of our mutual patient Freddy Smith 1955. We have reviewed their [facility] records; and to the best of our understanding this patient has not had stenting, ablation, cardiothoracic surgery or hospitalization for cardiovascular reasons in the past 6 months.  Freddy Smith has been seen within the past 18 months and is considered to have non-modifiable cardiac risk for this low-risk procedure/surgery. They may proceed from a cardiovascular standpoint and may hold their antiplatelet/anticoagulation as briefly as possible. Please have patient resume this medication when hemodynamically stable to do so.     Aspirin or Prasugrel   - hold 7 days prior to procedure/surgery, resume when hemodynamically stable      Clopidrogrel or Ticagrelor  - hold 7 days for all neurological procedures, hold 5 days prior to all other procedure/surgery,  resume when hemodynamically stable     Warfarin - hold 7 days for all neurological procedures, hold 5 days prior to all other procedure/surgery and coordinate with [facility] ([phone]) INR testing and dose management.      Pradaxa/Xarelto/Eliquis/Savesya - hold 1 day prior to procedure for low bleeding risk procedure, 2 days for high bleeding risk procedure, or consider holding 3 days or longer for patients with reduced kidney function (CrCl <30mL/min) or spinal/cranial surgeries/procedures.      If they have a mechanical heart valve, please coordinate with [facility] ([phone]) the proper management of their anticoagulant in the periprocedural or perioperative period.      Some patients have higher risk for cardiovascular complications or holding medication. If our patient has had prior complications of holding antiplatelet or anticoagulants in the  past and we have seen them after these events, we have addressed these concerns with the patient. They are at an unknown degree of increased risk for recurrent complication.  You may hold anticoagulation/antiplatelets for the procedure or surgery if the benefits of the procedure or surgery outweigh this nonmodifiable risk.      If Freddy Smith 1955 has new symptoms of heart failure decompensation, unstable arrythmia, or angina please reach out and we will assess the patient.      If you have other patient-specific concerns, please feel free to reach out to the patient's cardiologist directly at [phone].     Thank you,       [facility]

## 2024-03-08 NOTE — TELEPHONE ENCOUNTER
Last OV: 8/14/23  Proposed Surgery: Operative laparoscopy, drainage and excision of right ovarian cyst, possible unilateral oophorectomy, possible unilateral salpingectomy, possible laparotomy.  Surgery Date: 3/13/24  Requesting Office Name: [facility]  Fax Number: [phone]  Preference of Location (default is surgery center unless specified by Cardiologist or [place])  Prior Clearance Addressed: No      Anticoags/Antiplatelets: Other NA  Anticoags/Antiplatelet managed by Cardiology? YES    Outstanding Cardiac Imaging : No  Stent, Cardiac Devices, or Catheterization: No  Ablation, TAVR/Valve (including open heart), Cardioversion: No  Recent Cardiac Hospitalization: No            When: N/A  History (cardiac history):   Past Medical History:   Diagnosis Date    Arthritis     Elevated fasting glucose 01/09/2017    Gynecological disorder 02/15/2024    Heart murmur     was told this at some point in the past    High cholesterol     Hyperlipidemia     Postmenopausal 2011    Right foot pain 11/21/2018    Unspecified disorder of thyroid     Vitamin D deficiency              Surgical Clearance Letter Sent: YES   **Scan clearance request letter into [facility].**

## 2024-03-13 ENCOUNTER — HOSPITAL ENCOUNTER (OUTPATIENT)
Facility: MEDICAL CENTER | Age: 69
End: 2024-03-13
Attending: OBSTETRICS & GYNECOLOGY | Admitting: OBSTETRICS & GYNECOLOGY
Payer: COMMERCIAL

## 2024-03-13 ENCOUNTER — ANESTHESIA (OUTPATIENT)
Dept: SURGERY | Facility: MEDICAL CENTER | Age: 69
End: 2024-03-13
Payer: COMMERCIAL

## 2024-03-13 ENCOUNTER — PHARMACY VISIT (OUTPATIENT)
Dept: PHARMACY | Facility: MEDICAL CENTER | Age: 69
End: 2024-03-13
Payer: COMMERCIAL

## 2024-03-13 ENCOUNTER — ANESTHESIA EVENT (OUTPATIENT)
Dept: SURGERY | Facility: MEDICAL CENTER | Age: 69
End: 2024-03-13
Payer: COMMERCIAL

## 2024-03-13 VITALS
RESPIRATION RATE: 16 BRPM | SYSTOLIC BLOOD PRESSURE: 152 MMHG | HEART RATE: 69 BPM | WEIGHT: 207.23 LBS | HEIGHT: 67 IN | BODY MASS INDEX: 32.53 KG/M2 | TEMPERATURE: 97 F | OXYGEN SATURATION: 94 % | DIASTOLIC BLOOD PRESSURE: 75 MMHG

## 2024-03-13 DIAGNOSIS — Z01.812 PRE-OPERATIVE LABORATORY EXAMINATION: ICD-10-CM

## 2024-03-13 DIAGNOSIS — Z01.810 PRE-OPERATIVE CARDIOVASCULAR EXAMINATION: ICD-10-CM

## 2024-03-13 DIAGNOSIS — G89.18 POST-OP PAIN: ICD-10-CM

## 2024-03-13 LAB
GRAM STN SPEC: NORMAL
PATHOLOGY CONSULT NOTE: NORMAL
SIGNIFICANT IND 70042: NORMAL
SITE SITE: NORMAL
SOURCE SOURCE: NORMAL

## 2024-03-13 PROCEDURE — 87186 SC STD MICRODIL/AGAR DIL: CPT

## 2024-03-13 PROCEDURE — 700105 HCHG RX REV CODE 258: Performed by: OBSTETRICS & GYNECOLOGY

## 2024-03-13 PROCEDURE — 88112 CYTOPATH CELL ENHANCE TECH: CPT

## 2024-03-13 PROCEDURE — 160046 HCHG PACU - 1ST 60 MINS PHASE II: Performed by: OBSTETRICS & GYNECOLOGY

## 2024-03-13 PROCEDURE — 160048 HCHG OR STATISTICAL LEVEL 1-5: Performed by: OBSTETRICS & GYNECOLOGY

## 2024-03-13 PROCEDURE — 87205 SMEAR GRAM STAIN: CPT

## 2024-03-13 PROCEDURE — 160002 HCHG RECOVERY MINUTES (STAT): Performed by: OBSTETRICS & GYNECOLOGY

## 2024-03-13 PROCEDURE — 87070 CULTURE OTHR SPECIMN AEROBIC: CPT

## 2024-03-13 PROCEDURE — 700101 HCHG RX REV CODE 250: Performed by: OBSTETRICS & GYNECOLOGY

## 2024-03-13 PROCEDURE — 160028 HCHG SURGERY MINUTES - 1ST 30 MINS LEVEL 3: Performed by: OBSTETRICS & GYNECOLOGY

## 2024-03-13 PROCEDURE — RXMED WILLOW AMBULATORY MEDICATION CHARGE: Performed by: STUDENT IN AN ORGANIZED HEALTH CARE EDUCATION/TRAINING PROGRAM

## 2024-03-13 PROCEDURE — 88307 TISSUE EXAM BY PATHOLOGIST: CPT

## 2024-03-13 PROCEDURE — 160036 HCHG PACU - EA ADDL 30 MINS PHASE I: Performed by: OBSTETRICS & GYNECOLOGY

## 2024-03-13 PROCEDURE — 87077 CULTURE AEROBIC IDENTIFY: CPT

## 2024-03-13 PROCEDURE — 160009 HCHG ANES TIME/MIN: Performed by: OBSTETRICS & GYNECOLOGY

## 2024-03-13 PROCEDURE — 87076 CULTURE ANAEROBE IDENT EACH: CPT

## 2024-03-13 PROCEDURE — 700111 HCHG RX REV CODE 636 W/ 250 OVERRIDE (IP): Performed by: ANESTHESIOLOGY

## 2024-03-13 PROCEDURE — 160025 RECOVERY II MINUTES (STATS): Performed by: OBSTETRICS & GYNECOLOGY

## 2024-03-13 PROCEDURE — 700101 HCHG RX REV CODE 250: Performed by: ANESTHESIOLOGY

## 2024-03-13 PROCEDURE — 700105 HCHG RX REV CODE 258: Performed by: ANESTHESIOLOGY

## 2024-03-13 PROCEDURE — 88305 TISSUE EXAM BY PATHOLOGIST: CPT

## 2024-03-13 PROCEDURE — 58661 LAPAROSCOPY REMOVE ADNEXA: CPT | Mod: RT | Performed by: OBSTETRICS & GYNECOLOGY

## 2024-03-13 PROCEDURE — 87075 CULTR BACTERIA EXCEPT BLOOD: CPT

## 2024-03-13 PROCEDURE — 160035 HCHG PACU - 1ST 60 MINS PHASE I: Performed by: OBSTETRICS & GYNECOLOGY

## 2024-03-13 PROCEDURE — 160039 HCHG SURGERY MINUTES - EA ADDL 1 MIN LEVEL 3: Performed by: OBSTETRICS & GYNECOLOGY

## 2024-03-13 RX ORDER — EPHEDRINE SULFATE 50 MG/ML
INJECTION, SOLUTION INTRAVENOUS PRN
Status: DISCONTINUED | OUTPATIENT
Start: 2024-03-13 | End: 2024-03-13 | Stop reason: SURG

## 2024-03-13 RX ORDER — ONDANSETRON 2 MG/ML
INJECTION INTRAMUSCULAR; INTRAVENOUS PRN
Status: DISCONTINUED | OUTPATIENT
Start: 2024-03-13 | End: 2024-03-13 | Stop reason: SURG

## 2024-03-13 RX ORDER — MEPERIDINE HYDROCHLORIDE 25 MG/ML
12.5 INJECTION INTRAMUSCULAR; INTRAVENOUS; SUBCUTANEOUS
Status: DISCONTINUED | OUTPATIENT
Start: 2024-03-13 | End: 2024-03-13 | Stop reason: HOSPADM

## 2024-03-13 RX ORDER — HYDROMORPHONE HYDROCHLORIDE 1 MG/ML
0.2 INJECTION, SOLUTION INTRAMUSCULAR; INTRAVENOUS; SUBCUTANEOUS
Status: DISCONTINUED | OUTPATIENT
Start: 2024-03-13 | End: 2024-03-13 | Stop reason: HOSPADM

## 2024-03-13 RX ORDER — OXYCODONE HCL 5 MG/5 ML
5 SOLUTION, ORAL ORAL
Status: DISCONTINUED | OUTPATIENT
Start: 2024-03-13 | End: 2024-03-13 | Stop reason: HOSPADM

## 2024-03-13 RX ORDER — BUPIVACAINE HYDROCHLORIDE AND EPINEPHRINE 2.5; 5 MG/ML; UG/ML
INJECTION, SOLUTION EPIDURAL; INFILTRATION; INTRACAUDAL; PERINEURAL
Status: DISCONTINUED | OUTPATIENT
Start: 2024-03-13 | End: 2024-03-13 | Stop reason: HOSPADM

## 2024-03-13 RX ORDER — LIDOCAINE HYDROCHLORIDE 20 MG/ML
INJECTION, SOLUTION EPIDURAL; INFILTRATION; INTRACAUDAL; PERINEURAL PRN
Status: DISCONTINUED | OUTPATIENT
Start: 2024-03-13 | End: 2024-03-13 | Stop reason: SURG

## 2024-03-13 RX ORDER — SODIUM CHLORIDE, SODIUM LACTATE, POTASSIUM CHLORIDE, CALCIUM CHLORIDE 600; 310; 30; 20 MG/100ML; MG/100ML; MG/100ML; MG/100ML
INJECTION, SOLUTION INTRAVENOUS CONTINUOUS
Status: DISCONTINUED | OUTPATIENT
Start: 2024-03-13 | End: 2024-03-13 | Stop reason: HOSPADM

## 2024-03-13 RX ORDER — CEFAZOLIN SODIUM 1 G/3ML
INJECTION, POWDER, FOR SOLUTION INTRAMUSCULAR; INTRAVENOUS PRN
Status: DISCONTINUED | OUTPATIENT
Start: 2024-03-13 | End: 2024-03-13 | Stop reason: SURG

## 2024-03-13 RX ORDER — DEXAMETHASONE SODIUM PHOSPHATE 4 MG/ML
INJECTION, SOLUTION INTRA-ARTICULAR; INTRALESIONAL; INTRAMUSCULAR; INTRAVENOUS; SOFT TISSUE PRN
Status: DISCONTINUED | OUTPATIENT
Start: 2024-03-13 | End: 2024-03-13 | Stop reason: SURG

## 2024-03-13 RX ORDER — KETOROLAC TROMETHAMINE 15 MG/ML
INJECTION, SOLUTION INTRAMUSCULAR; INTRAVENOUS PRN
Status: DISCONTINUED | OUTPATIENT
Start: 2024-03-13 | End: 2024-03-13 | Stop reason: SURG

## 2024-03-13 RX ORDER — HYDROMORPHONE HYDROCHLORIDE 1 MG/ML
0.4 INJECTION, SOLUTION INTRAMUSCULAR; INTRAVENOUS; SUBCUTANEOUS
Status: DISCONTINUED | OUTPATIENT
Start: 2024-03-13 | End: 2024-03-13 | Stop reason: HOSPADM

## 2024-03-13 RX ORDER — MIDAZOLAM HYDROCHLORIDE 1 MG/ML
INJECTION INTRAMUSCULAR; INTRAVENOUS PRN
Status: DISCONTINUED | OUTPATIENT
Start: 2024-03-13 | End: 2024-03-13 | Stop reason: SURG

## 2024-03-13 RX ORDER — DIPHENHYDRAMINE HYDROCHLORIDE 50 MG/ML
12.5 INJECTION INTRAMUSCULAR; INTRAVENOUS
Status: DISCONTINUED | OUTPATIENT
Start: 2024-03-13 | End: 2024-03-13 | Stop reason: HOSPADM

## 2024-03-13 RX ORDER — ONDANSETRON 2 MG/ML
4 INJECTION INTRAMUSCULAR; INTRAVENOUS
Status: DISCONTINUED | OUTPATIENT
Start: 2024-03-13 | End: 2024-03-13 | Stop reason: HOSPADM

## 2024-03-13 RX ORDER — BUPIVACAINE HYDROCHLORIDE 2.5 MG/ML
INJECTION, SOLUTION EPIDURAL; INFILTRATION; INTRACAUDAL
Status: DISCONTINUED
Start: 2024-03-13 | End: 2024-03-13 | Stop reason: HOSPADM

## 2024-03-13 RX ORDER — HYDRALAZINE HYDROCHLORIDE 20 MG/ML
5 INJECTION INTRAMUSCULAR; INTRAVENOUS
Status: DISCONTINUED | OUTPATIENT
Start: 2024-03-13 | End: 2024-03-13 | Stop reason: HOSPADM

## 2024-03-13 RX ORDER — EPINEPHRINE 1 MG/ML(1)
AMPUL (ML) INJECTION
Status: DISCONTINUED
Start: 2024-03-13 | End: 2024-03-13 | Stop reason: HOSPADM

## 2024-03-13 RX ORDER — OXYCODONE HCL 5 MG/5 ML
10 SOLUTION, ORAL ORAL
Status: DISCONTINUED | OUTPATIENT
Start: 2024-03-13 | End: 2024-03-13 | Stop reason: HOSPADM

## 2024-03-13 RX ORDER — HYDROCODONE BITARTRATE AND ACETAMINOPHEN 5; 325 MG/1; MG/1
1 TABLET ORAL EVERY 6 HOURS PRN
Qty: 15 TABLET | Refills: 0 | Status: SHIPPED | OUTPATIENT
Start: 2024-03-13 | End: 2024-03-18

## 2024-03-13 RX ORDER — MIDAZOLAM HYDROCHLORIDE 1 MG/ML
1 INJECTION INTRAMUSCULAR; INTRAVENOUS
Status: DISCONTINUED | OUTPATIENT
Start: 2024-03-13 | End: 2024-03-13 | Stop reason: HOSPADM

## 2024-03-13 RX ORDER — HALOPERIDOL 5 MG/ML
1 INJECTION INTRAMUSCULAR
Status: DISCONTINUED | OUTPATIENT
Start: 2024-03-13 | End: 2024-03-13 | Stop reason: HOSPADM

## 2024-03-13 RX ORDER — SODIUM CHLORIDE, SODIUM LACTATE, POTASSIUM CHLORIDE, CALCIUM CHLORIDE 600; 310; 30; 20 MG/100ML; MG/100ML; MG/100ML; MG/100ML
INJECTION, SOLUTION INTRAVENOUS
Status: DISCONTINUED | OUTPATIENT
Start: 2024-03-13 | End: 2024-03-13 | Stop reason: SURG

## 2024-03-13 RX ORDER — HYDROMORPHONE HYDROCHLORIDE 1 MG/ML
0.5 INJECTION, SOLUTION INTRAMUSCULAR; INTRAVENOUS; SUBCUTANEOUS
Status: DISCONTINUED | OUTPATIENT
Start: 2024-03-13 | End: 2024-03-13 | Stop reason: HOSPADM

## 2024-03-13 RX ORDER — HYDROMORPHONE HYDROCHLORIDE 2 MG/ML
INJECTION, SOLUTION INTRAMUSCULAR; INTRAVENOUS; SUBCUTANEOUS PRN
Status: DISCONTINUED | OUTPATIENT
Start: 2024-03-13 | End: 2024-03-13 | Stop reason: SURG

## 2024-03-13 RX ORDER — EPHEDRINE SULFATE 50 MG/ML
5 INJECTION, SOLUTION INTRAVENOUS
Status: DISCONTINUED | OUTPATIENT
Start: 2024-03-13 | End: 2024-03-13 | Stop reason: HOSPADM

## 2024-03-13 RX ORDER — PHENYLEPHRINE HYDROCHLORIDE 10 MG/ML
INJECTION, SOLUTION INTRAMUSCULAR; INTRAVENOUS; SUBCUTANEOUS PRN
Status: DISCONTINUED | OUTPATIENT
Start: 2024-03-13 | End: 2024-03-13 | Stop reason: SURG

## 2024-03-13 RX ORDER — LABETALOL HYDROCHLORIDE 5 MG/ML
5 INJECTION, SOLUTION INTRAVENOUS
Status: DISCONTINUED | OUTPATIENT
Start: 2024-03-13 | End: 2024-03-13 | Stop reason: HOSPADM

## 2024-03-13 RX ORDER — SODIUM CHLORIDE, SODIUM LACTATE, POTASSIUM CHLORIDE, CALCIUM CHLORIDE 600; 310; 30; 20 MG/100ML; MG/100ML; MG/100ML; MG/100ML
INJECTION, SOLUTION INTRAVENOUS CONTINUOUS
Status: ACTIVE | OUTPATIENT
Start: 2024-03-13 | End: 2024-03-13

## 2024-03-13 RX ADMIN — KETOROLAC TROMETHAMINE 15 MG: 15 INJECTION, SOLUTION INTRAMUSCULAR; INTRAVENOUS at 08:40

## 2024-03-13 RX ADMIN — PROPOFOL 50 MCG/KG/MIN: 10 INJECTION, EMULSION INTRAVENOUS at 08:14

## 2024-03-13 RX ADMIN — PHENYLEPHRINE HYDROCHLORIDE 100 MCG: 10 INJECTION INTRAVENOUS at 07:51

## 2024-03-13 RX ADMIN — DEXAMETHASONE SODIUM PHOSPHATE 8 MG: 4 INJECTION INTRA-ARTICULAR; INTRALESIONAL; INTRAMUSCULAR; INTRAVENOUS; SOFT TISSUE at 07:38

## 2024-03-13 RX ADMIN — SODIUM CHLORIDE, POTASSIUM CHLORIDE, SODIUM LACTATE AND CALCIUM CHLORIDE: 600; 310; 30; 20 INJECTION, SOLUTION INTRAVENOUS at 06:34

## 2024-03-13 RX ADMIN — ROCURONIUM BROMIDE 80 MG: 10 INJECTION, SOLUTION INTRAVENOUS at 07:33

## 2024-03-13 RX ADMIN — HYDROMORPHONE HYDROCHLORIDE 0.5 MG: 2 INJECTION INTRAMUSCULAR; INTRAVENOUS; SUBCUTANEOUS at 08:02

## 2024-03-13 RX ADMIN — HYDROMORPHONE HYDROCHLORIDE 0.5 MG: 2 INJECTION INTRAMUSCULAR; INTRAVENOUS; SUBCUTANEOUS at 08:46

## 2024-03-13 RX ADMIN — MIDAZOLAM HYDROCHLORIDE 2 MG: 1 INJECTION, SOLUTION INTRAMUSCULAR; INTRAVENOUS at 07:30

## 2024-03-13 RX ADMIN — CEFAZOLIN 2 G: 1 INJECTION, POWDER, FOR SOLUTION INTRAMUSCULAR; INTRAVENOUS at 07:38

## 2024-03-13 RX ADMIN — SODIUM CHLORIDE, POTASSIUM CHLORIDE, SODIUM LACTATE AND CALCIUM CHLORIDE: 600; 310; 30; 20 INJECTION, SOLUTION INTRAVENOUS at 07:30

## 2024-03-13 RX ADMIN — PHENYLEPHRINE HYDROCHLORIDE 100 MCG: 10 INJECTION INTRAVENOUS at 07:43

## 2024-03-13 RX ADMIN — PROPOFOL 150 MG: 10 INJECTION, EMULSION INTRAVENOUS at 07:33

## 2024-03-13 RX ADMIN — EPHEDRINE SULFATE 5 MG: 50 INJECTION, SOLUTION INTRAVENOUS at 07:41

## 2024-03-13 RX ADMIN — FENTANYL CITRATE 100 MCG: 50 INJECTION, SOLUTION INTRAMUSCULAR; INTRAVENOUS at 07:33

## 2024-03-13 RX ADMIN — ONDANSETRON 4 MG: 2 INJECTION INTRAMUSCULAR; INTRAVENOUS at 08:40

## 2024-03-13 RX ADMIN — SUGAMMADEX 200 MG: 100 INJECTION, SOLUTION INTRAVENOUS at 08:45

## 2024-03-13 RX ADMIN — LIDOCAINE HYDROCHLORIDE 80 MG: 20 INJECTION, SOLUTION EPIDURAL; INFILTRATION; INTRACAUDAL at 07:33

## 2024-03-13 ASSESSMENT — FIBROSIS 4 INDEX: FIB4 SCORE: 0.85

## 2024-03-13 ASSESSMENT — PAIN DESCRIPTION - PAIN TYPE
TYPE: SURGICAL PAIN

## 2024-03-13 NOTE — DISCHARGE INSTRUCTIONS
HOME CARE INSTRUCTIONS    ACTIVITY: Rest and take it easy for the first 24 hours.  A responsible adult is recommended to remain with you during that time.  It is normal to feel sleepy.  We encourage you to not do anything that requires balance, judgment or coordination.    FOR 24 HOURS DO NOT:  Drive, operate machinery or run household appliances.  Drink beer or alcoholic beverages.  Make important decisions or sign legal documents.    DIET: To avoid nausea, slowly advance diet as tolerated, avoiding spicy or greasy foods for the first day.  Add more substantial food to your diet according to your physician's instructions.   INCREASE FLUIDS AND FIBER TO AVOID CONSTIPATION.    SURGICAL DRESSING/BATHING:     MEDICATIONS: Resume taking daily medication.  Take prescribed pain medication with food.  If no medication is prescribed, you may take non-aspirin pain medication if needed.  PAIN MEDICATION CAN BE VERY CONSTIPATING.  Take a stool softener or laxative such as senokot, pericolace, or milk of magnesia if needed.     Last pain medication given: Toradol (like ibuprofen) given at 8:40 am.        A follow-up appointment should be arranged with your doctor ; call to schedule.    You should CALL YOUR PHYSICIAN if you develop:  Fever greater than 101 degrees F.  Pain not relieved by medication, or persistent nausea or vomiting.  Excessive bleeding (blood soaking through dressing) or unexpected drainage from the wound.  Extreme redness or swelling around the incision site, drainage of pus or foul smelling drainage.  Inability to urinate or empty your bladder within 8 hours.  Problems with breathing or chest pain.    You should call 911 if you develop problems with breathing or chest pain.  If you are unable to contact your doctor or surgical center, you should go to the nearest emergency room or urgent care center.  Physician's telephone #: [phone]     MILD FLU-LIKE SYMPTOMS ARE NORMAL.  YOU MAY EXPERIENCE GENERALIZED  MUSCLE ACHES, THROAT IRRITATION, HEADACHE AND/OR SOME NAUSEA.    If any questions arise, call your doctor.  If your doctor is not available, please feel free to call the Surgical Center at [phone].  The Center is open Monday through Friday from 7AM to 7PM.      A registered nurse may call you a few days after your surgery to see how you are doing after your procedure.    You may also receive a survey in the mail within the next two weeks and we ask that you take a few moments to complete the survey and return it to us.  Our goal is to provide you with very good care and we value your comments.     Depression / Suicide Risk    As you are discharged from this [facility] facility, it is important to learn how to keep safe from harming yourself.    Recognize the warning signs:  Abrupt changes in personality, positive or negative- including increase in energy   Giving away possessions  Change in eating patterns- significant weight changes-  positive or negative  Change in sleeping patterns- unable to sleep or sleeping all the time   Unwillingness or inability to communicate  Depression  Unusual sadness, discouragement and loneliness  Talk of wanting to die  Neglect of personal appearance   Rebelliousness- reckless behavior  Withdrawal from people/activities they love  Confusion- inability to concentrate     If you or a loved one observes any of these behaviors or has concerns about self-harm, here's what you can do:  Talk about it- your feelings and reasons for harming yourself  Remove any means that you might use to hurt yourself (examples: pills, rope, extension cords, firearm)  Get professional help from the community (Mental Health, Substance Abuse, psychological counseling)  Do not be alone:Call your Safe Contact- someone whom you trust who will be there for you.  Call your local CRISIS HOTLINE 345-7574 or 908-532-2347  Call your local Children's Mobile Crisis Response Team [place] (799) 811-1189  or www.[employer]  Call the toll free National Suicide Prevention Hotlines   National Suicide Prevention Lifeline 290-420-TMHR (8576)  National Mobi-Moto Line Network 800-SUICIDE (581-8888)    I acknowledge receipt and understanding of these Home Care instructions.

## 2024-03-13 NOTE — PROGRESS NOTES
1010: Pt arrived to Phase II via jm, report from PACU LUZ Cole. Pt. Voided in restroom without difficulty. Transferred to recliner chair. Monitor attached. Juice provided for pt. Awaiting ride. Incision to right side CDI.   1015: Pt.'s sister arrived, updates given. Discussed discharge instructions. Questions answered.   1025: IV removed, Pt. Getting dressed.   1051: Pt. Escorted out via w/c in stable condition.

## 2024-03-13 NOTE — ANESTHESIA PROCEDURE NOTES
Airway    Date/Time: 3/13/2024 7:37 AM    Performed by: Dwaine Hernandez M.D.  Authorized by: Dwaine Hernandez M.D.    Location:  OR  Urgency:  Elective  Indications for Airway Management:  Anesthesia      Spontaneous Ventilation: absent    Sedation Level:  Deep  Preoxygenated: Yes    Patient Position:  Sniffing  Final Airway Type:  Endotracheal airway  Final Endotracheal Airway:  ETT  Cuffed: Yes    Technique Used for Successful ETT Placement:  Direct laryngoscopy  Devices/Methods Used in Placement:  Anterior pressure/BURP    Insertion Site:  Oral  Blade Type:  Chip  Laryngoscope Blade/Videolaryngoscope Blade Size:  3  ETT Size (mm):  6.5  Measured from:  Teeth  ETT to Teeth (cm):  20  Placement Verified by: auscultation and capnometry    Cormack-Lehane Classification:  Grade IIa - partial view of glottis  Number of Attempts at Approach:  1        
Constitutional Symptoms: No weakness, fevers, chills, weight loss  Eyes: No visual changes, headache, eye pain, double vision  Ears, Nose, Mouth, Throat: No runny nose, sinus pain, ear pain, tinnitus, sore throat, dysphagia, odynophagia  Cardiovascular: No chest pain, palpitations, edema  Respiratory: No cough, wheezing, hemoptysis, shortness of breath  Gastrointestinal: No abdominal pain, dysphagia, anorexia, nausea/vomiting, diarrhea/constipation, hematemesis, BRBPR, melena  Genitourinary: No dysuria, frequency, hematuria  Musculoskeletal: +joint pain, no joint swelling, decreased ROM  Skin: No pruritus, rashes, lesions, wounds  Neurologic:  No seizures, headache, paraesthesia, numbness, limb weakness    Positives and pertinent negatives noted and all other systems negative.

## 2024-03-13 NOTE — OP REPORT
OPERATIVE NOTE    Patient: Freddy Smith    Surgery Date: 3/13/2024    PRE-PROCEDURE DIAGNOSIS:    Enlarged simple right ovarian cyst    POST PROCEDURE DIAGNOSIS:    Same as above    PROCEDURE(S) PERFORMED:    Laparoscopic right salpingoophorectomy.     ANESTHESIA TYPE:    General    SURGEON: Ish Zhou MD    ASSISTANT: Cleveland Islas MD    ANESTHESIOLOGIST:  Dwaine Hernandez M.D.     IV FLUIDS:   1200 ML    EBL:   < 5 ML    UOP:   400 ML    MEDS:   Ancef 2g IV     DRAINS:   None    FINDING(S):    Numerous bowel adhesions to anterior abdominal wall and to mesh spanning most of anterior abdominal wall. Normal uterus, normal fallopian tubes, enlarged right ovary with simple cyst measuring total about 9 cm. Absent left ovary.     COMPLICATIONS:    None    SPECIMEN(S) SENT TO PATHOLOGY?  Right ovarian cyst fluid and right ovary and fallopian tube    INDICATIONS:  68 y.o.  with persistent and enlarging asymptomatic simple right ovarian cyst here for surgical management.   Patient has had a history of multiple ventral hernia repairs with mesh as well as a panniculectomy, so general surgeon was arranged to assist for the case.   Risks of surgery discussed with patient, including, but not limited to, bleeding, infection, damage to other organs such as bowel, bladder, ureters, and nerves, need for reoperation, need for blood transfusion if indicated. Patient understands all risks and all questions answered. Consents were signed.     PROCEDURE:  Patient was taken to the operating room where general anesthesia was found to be adequate. Patient was prepped and draped in dorsal lithotomy position in [facility]. Preoperative antibiotics were given as above. Correct time out was performed.   Heaton catheter was placed and sponge stick placed into the vagina.   Dr. Islas started the procedure by placing a port in the right upper quadrant. Please refer to his operative note for further details.  Local anesthetic was injected at planned port site, a 5 mm incision was made here and abdominal wall tented upwards. Veress needle was inserted and gas connected, opening pressure was noted to be 5 mm Hg, and abdomen was insufflated to 15 mmHg. A 5 mm camera with Optiview trocar was inserted into the abdominal cavity by Dr. Islas. View of the abdomen was carried out. There appeared to be no bowel injury below. There were numerous bowel adhesions to the anterior abdominal wall. Care was made to navigate the camera among these adhesions to visualize the pelvis. Anterior abdominal wall was inspected to find a safe spot for other port sites. There was mesh spanning most of the anterior abdominal wall. The sponge stick was manipulated to elevate the uterus and the right ovary and tube was visible at this time. Right ovary was enlarged as expected. Decision was made to insert next port in the left lower quadrant lateral to the mesh edge. Local anesthesia was injected at this area and 5 mm incision was made here. 5 mm port was placed under direct visualization avoiding the mesh and bowel adhesions.   Another port was attempted to be placed just 5 cm cephalad to the 5 mm port, so 10 mm incision was made here, however, when attempting to place the 10 mm port, bowel adhesions were in the way here, so port placement was avoided here. Another safe site just 4-5 cm caudad to the 5 mm port was identified and 10 mm port placed here. It was away from the bowel adhesions. There were no adhesions in the pelvis.   The camera was then placed at the 5 mm left lower quadrant port. Suction device was placed through 5 mm right upper quadrant port.. Ligasure device was inserted through 10 mm left lower quadrant port and was used to create a defect in the simple thin walled cyst and cyst fluid was suctioned out. Fluid appeared clear yellow. Most of the fluid was drained. The ovary had reduced greatly in size.     Decision was made to do  a right salpingoophorectomy.   The right ovary and tube was elevated with grasper and using Ligasure device completely divided from pelvic sidewall. Ureter was deep in pelvic sidewall away from our surgical field. IP ligament was also cauterized twice and divided. Right ovary and tube completely excised from adnexa. No active bleeding noted at the excision site.     A 10 mm endocatch bag was inserted through the left lower quadrant port and right ovary/tube was placed in the bag and removed through the port site intact. Port was replaced with Matt Kalli cone. Right ovary and fallopian tube was sent as pathology specimen.   Right adnexa at site of cautery was observed to have no bleeding. Surgicel powder was applied to this area for mild oozing.     The left lower quadrant port 10 mm fascial incision was closed using Matt Kalli device using suture passer with 0 vicryl with direct visualization. In a similar fashion, the 5 mm left lower quadrant port fascia was also closed with 0 vicryl under direct visualization using suture passer device.  The 10 mm skin incision just cephalad to the 5 mm left lower quadrant port site did not have a fascial defect as we had not place a port through and through here due to being proximal to bowel and adhesions. The right upper quadrant ports were removed, abdomen was desufflated. All incisions were closed with 4-0 monocryl in a subcuticular fashion and sealed with dermabond.  Sponge lap and needle counts were correct x 3. Total blood loss was about < 5 ml. Heaton catheter and sponge stick were removed from the vaginal vault. Patient was undraped, taken out of dorsal lithotomy position and extubated. Patient was taken to the recovery room in a stable fashion and tolerated the procedure well.       Ish Zhou M.D.    Obstetrics and Gynecology    3/13/2024 9:31 AM

## 2024-03-13 NOTE — OR SURGEON
Immediate Post OP Note    PreOp Diagnosis: Simple enlarged right ovarian cyst      PostOp Diagnosis: Same as above      Procedure(s):  Laparoscopic right salpingoophorectomy    Wound Class: Clean    Surgeon(s):  JAQUELINE Contreras M.D. Sam Schultz, PA    Anesthesiologist/Type of Anesthesia:  Anesthesiologist: Dwaine Hernandez M.D./General    Surgical Staff:  Circulator: Gisel Funez R.N.  Scrub Person: Rich Valverde; Keri Ibarra  First Assist: Angela Brooke P.A.-C.    Specimens removed if any:  ID Type Source Tests Collected by Time Destination   1 : RIGHT OVARIAN CYST FLUID FOR CYTOLOGY Body Fluid Cyst MISCELLANEOUS TEST, AEROBIC/ANAEROBIC CULTURE (SURGERY) Ish Zhou M.D. 3/13/2024  8:23 AM    A : RIGHT OVARIAN CYST, RIGHT OVARY AND RIGHT FALLOPIAN TUBE Other Other PATHOLOGY SPECIMEN Ish Zhou M.D. 3/13/2024  8:30 AM      IVF: 1200 ml     UOP: 400 ml clear yellow urine via smith    Estimated Blood Loss: < 5 ml    Findings: Numerous bowel adhesions to anterior abdominal wall and to mesh spanning most of anterior abdominal wall. Normal uterus, normal fallopian tubes, enlarged right ovary with simple cyst measuring total about 9 cm. Absent left ovary.     Complications: None        3/13/2024 8:48 AM Ish Zhou M.D.

## 2024-03-13 NOTE — ANESTHESIA POSTPROCEDURE EVALUATION
Patient: Freddy Smith    Procedure Summary       Date: 03/13/24 Room / Location: [facility] ROOM 25 / SURGERY SAME DAY [facility]    Anesthesia Start: 0730 Anesthesia Stop: 0857    Procedure: OPERATIVE LAPAROSCOPY, DRAINAGE AND EXCISION OF RIGHT OVARIAN CYST,  RIGHT SALPINGECTOMY AND OOPHORECTOMY, (Right: Abdomen) Diagnosis: (ENLARGED CYST OF RIGHT OVARY)    Surgeons: Ish Zhou M.D. Responsible Provider: Dwaine Hernandez M.D.    Anesthesia Type: general ASA Status: 3            Final Anesthesia Type: general  Last vitals  BP   Blood Pressure : 132/62    Temp   36.1 °C (97 °F)    Pulse   69   Resp   18    SpO2   99 %      Anesthesia Post Evaluation    Patient location during evaluation: PACU  Patient participation: complete - patient participated  Level of consciousness: awake and alert    Airway patency: patent  Anesthetic complications: no  Cardiovascular status: hemodynamically stable  Respiratory status: acceptable  Hydration status: euvolemic    PONV: none          No notable events documented.     Nurse Pain Score: 0 (NPRS)

## 2024-03-13 NOTE — ANESTHESIA PREPROCEDURE EVALUATION
Case: [id number] Date/Time: 03/13/24 0715    Procedures:       OPERATIVE LAPAROSCOPY, DRAINAGE AND EXCISION OF RIGHT OVARIAN CYST, POSSIBLE RIGHT OOPHORECTOMY, POSSIBLE LEFT OOPHORECTOMY, POSSIBLE RIGHT SALPINGECTOMY, POSSIBLE LEFT SALPINGECTOMY, POSSIBLE LAPAROTOMY      MINILAPAROTOMY    Pre-op diagnosis: CYST OF LEFT OVARY    Location: [facility] ROOM 25 / SURGERY SAME DAY [facility]    Surgeons: Ish Zhou M.D.            Relevant Problems   CARDIAC   (positive) Coronary artery calcification seen on CAT scan      ENDO   (positive) Acquired hypothyroidism     NPO >8h  METS >4  Denies CP/SOB with activity  Denies problems with anesthesia    Echo 11/23:  Normal left ventricular systolic function.  The left ventricular ejection fraction is visually estimated to be 65-70%.  Normal right ventricular size and systolic function.  Right heart pressures are normal.  No valvular abnormalities.     Physical Exam    Airway   Mallampati: II  TM distance: >3 FB  Neck ROM: full       Cardiovascular - normal exam  Rhythm: regular  Rate: normal  (-) murmur     Dental - normal exam           Pulmonary - normal exam  Breath sounds clear to auscultation     Abdominal    Neurological - normal exam                   Anesthesia Plan    ASA 3   ASA physical status 3 criteria: CAD/stents (> 3 months)    Plan - general       Airway plan will be ETT          Induction: intravenous    Postoperative Plan: Postoperative administration of opioids is intended.    Pertinent diagnostic labs and testing reviewed    Informed Consent:    Anesthetic plan and risks discussed with patient.    Use of blood products discussed with: patient whom consented to blood products.

## 2024-03-13 NOTE — ANESTHESIA TIME REPORT
Anesthesia Start and Stop Event Times       Date Time Event    3/13/2024 0716 Ready for Procedure     0730 Anesthesia Start     0857 Anesthesia Stop          Responsible Staff  03/13/24      Name Role Begin End    Dwaine Hernandez M.D. Anesth 0730 0857          Overtime Reason:  no overtime (within assigned shift)    Comments:

## 2024-03-13 NOTE — OP REPORT
DATE OF SERVICE:  03/13/2024     OPERATING SURGEON:  Cleveland Islas MD.     CO-SURGEON:  Dr. Ish Zhou.     PROCEDURE:   1.  Diagnostic laparoscopy -- Dr. Islas.  2.  Laparoscopic right ovarian resection of cyst and the salpingectomy -- Dr. Zhou.     ANESTHESIA:  General.     ESTIMATED BLOOD LOSS:  Less than 2 mL.     PREOPERATIVE DIAGNOSES:    1.  Symptomatic right ovarian cyst.  2.  Multiple incisional hernia repairs.     POSTOPERATIVE DIAGNOSES:    1.  Symptomatic right ovarian cyst.  2.  Multiple incisional hernia repairs.     SUMMARY:  A 68-year-old female who has obesity and complains of pelvic pain.    She has been followed by Dr. Zhou for a large cyst with negative CA-125 and at   this time is scheduled for a laparoscopic resection.     The concern in her with co-surgeon status is multiple abdominal surgeries   involving mesh.     DESCRIPTION OF PROCEDURE:  The patient was taken to the operating room and   satisfactory general anesthesia induced by endotracheal intubation.  The   patient was placed in the lithotomy position with the vaginal abdominal preps.     After successful timeout, local was instilled after the prepping the right   upper quadrant.  A small puncture was then made and a Veress needle was used   for insufflation.     A 5-mm port was passed successfully and inspection demonstrated _____   adhesions present in the midline down to the pelvis, where the mesh could be   seen, but a free space was seen in the left lower quadrant.     Please see the dictation of Dr. Zhou for the placement of the pelvic trocars   and ovariectomy, and salpingectomy.        ______________________________  MD PADMINI BERRIOS/PRINCE    DD:  03/13/2024 08:40  DT:  03/13/2024 09:04    Job#:  [id number]    CC:Ish Zhou MD

## 2024-03-13 NOTE — OR NURSING
0854 Patient arrived from OR to PACU 5. Connected to monitor and report received from anesthesia and RN. VSS. 6 L 02 via mask. Oral airway in place. Breaths calm, even, unlabored.     Incisions with dermabond x4 to abdomen; clean, dry, intact.     0904: Report to LUZ Cole

## 2024-03-13 NOTE — OR NURSING
0904 Report received from Erica ESCOBAR    0910 Abelardo PEREZ at bedside to check on patient after procedure. Patient awake. Oral airway Dc'd. No reports of pain or nausea at this time. Patient requesting to sleep.     0950 RN updated patients sister over the phone    0955 Report called to phase two.     1000 Patient taken to phase two via rmalgorzata. Patient voided in restroom before arrival.

## 2024-03-17 LAB
BACTERIA FLD AEROBE CULT: ABNORMAL
BACTERIA FLD AEROBE CULT: ABNORMAL
BACTERIA SPEC ANAEROBE CULT: ABNORMAL
BACTERIA SPEC ANAEROBE CULT: ABNORMAL
GRAM STN SPEC: ABNORMAL
SIGNIFICANT IND 70042: ABNORMAL
SIGNIFICANT IND 70042: ABNORMAL
SITE SITE: ABNORMAL
SITE SITE: ABNORMAL
SOURCE SOURCE: ABNORMAL
SOURCE SOURCE: ABNORMAL

## 2024-03-19 ENCOUNTER — GYNECOLOGY VISIT (OUTPATIENT)
Dept: OBGYN | Facility: CLINIC | Age: 69
End: 2024-03-19
Payer: COMMERCIAL

## 2024-03-19 VITALS
WEIGHT: 207.1 LBS | BODY MASS INDEX: 32.51 KG/M2 | SYSTOLIC BLOOD PRESSURE: 136 MMHG | DIASTOLIC BLOOD PRESSURE: 75 MMHG | HEIGHT: 67 IN

## 2024-03-19 DIAGNOSIS — T81.40XA POSTOPERATIVE INFECTION, UNSPECIFIED TYPE, INITIAL ENCOUNTER: ICD-10-CM

## 2024-03-19 DIAGNOSIS — Z48.89 ENCOUNTER FOR POSTOPERATIVE CARE: ICD-10-CM

## 2024-03-19 PROCEDURE — 3078F DIAST BP <80 MM HG: CPT | Performed by: OBSTETRICS & GYNECOLOGY

## 2024-03-19 PROCEDURE — 99024 POSTOP FOLLOW-UP VISIT: CPT | Performed by: OBSTETRICS & GYNECOLOGY

## 2024-03-19 PROCEDURE — 3075F SYST BP GE 130 - 139MM HG: CPT | Performed by: OBSTETRICS & GYNECOLOGY

## 2024-03-19 RX ORDER — METRONIDAZOLE 500 MG/1
500 TABLET ORAL 2 TIMES DAILY
Qty: 20 TABLET | Refills: 0 | Status: SHIPPED | OUTPATIENT
Start: 2024-03-19 | End: 2024-03-29

## 2024-03-19 RX ORDER — AMOXICILLIN AND CLAVULANATE POTASSIUM 875; 125 MG/1; MG/1
1 TABLET, FILM COATED ORAL 2 TIMES DAILY
Qty: 20 TABLET | Refills: 0 | Status: SHIPPED | OUTPATIENT
Start: 2024-03-19 | End: 2024-03-29

## 2024-03-19 ASSESSMENT — FIBROSIS 4 INDEX: FIB4 SCORE: 0.85

## 2024-03-19 NOTE — PROGRESS NOTES
"Postoperative Follow Up Visit    Freddy Smith is a 68 y.o. female    Chief complaint    No chief complaint on file.      S/p L/S RSO on 3/13/24 for persistent enlarged right ovarian cyst presenting for postop check and incidentally found positive cyst fluid culture for E. Coli and Bacteroides vulgatis.   There was no intraoperative lysis of adhesions performed or any other bowel manipulation. There was no concern for an infectious process during the case. Cytology was ordered on fluid which was benign, see below.  Pathology benign, discussed with patient.     COMPLAINTS:    Patient denies fevers/chills, excessive pain, N/V or abdominal distension. Denies constipation/diarrhea. Passing flatus. Voiding with no difficulty. Minimal pain at incision sites when bending over slightly.       OBJECTIVE:    /75 (BP Location: Left arm, Patient Position: Sitting, BP Cuff Size: Large adult)   Ht 5' 7\"   Wt 207 lb 1.6 oz   LMP 07/25/2011   BMI 32.44 kg/m²     General: No acute distress, well nourished, alert.     Chest Unlabored    ABDOMEN: Soft nondistended, nontender, no peritoneal signs, no masses. Incisions c/d/I, minimal appropriate tenderness at incision sites.     INCISIONS: Clean, dry, intact, no drainage, erythema or separation.    Labs    3/13/24  Resulted on 3/17/24  Significant Indicator POS Positive (POS)   Source BF   Site Right Ovarian Cyst Drain   Culture Result  Abnormal   Growth noted after further incubation, see below for  organism identification.    Gram Stain Result No organisms seen.   Culture Result  Abnormal   Escherichia coli  Isolated from enrichment broth only, please correlate with  clinical condition.    Resulting Agency M        Susceptibility     Escherichia coli     GASPER     Amoxicillin/CA <=8/4 mcg/mL Sensitive     Ampicillin <=8 mcg/mL Sensitive     Ampicillin/sulbactam <=4/2 mcg/mL Sensitive     Cefazolin <=2 mcg/mL Sensitive     Cefepime <=2 mcg/mL Sensitive     Ceftriaxone " <=1 mcg/mL Sensitive     Cefuroxime <=4 mcg/mL Sensitive     Ciprofloxacin <=0.25 mcg/mL Sensitive     Ertapenem <=0.5 mcg/mL Sensitive     Gentamicin <=2 mcg/mL Sensitive     Levofloxacin <=0.5 mcg/mL Sensitive     Minocycline <=4 mcg/mL Sensitive     Moxifloxacin <=2 mcg/mL Sensitive     Pip/Tazobactam <=8 mcg/mL Sensitive     Tigecycline <=2 mcg/mL Sensitive     Tobramycin <=2 mcg/mL Sensitive     Trimeth/Sulfa <=0.5/9.5 m... Sensitive                           Component 6 d ago   Significant Indicator POS Positive (POS)   Source BF   Site Right Ovarian Cyst Drain   Culture Result - Abnormal    Culture Result  Abnormal   Bacteroides vulgatus group  Isolated from enrichment broth only, please correlate with  clinical condition.     PATHOLOGY      Component 6 d ago   Significant Indicator .   Source BF   Site Right Ovarian Cyst Drain   Gram Stain Result No organisms seen.      URGICAL PATHOLOGY CONSULTATION       FINAL DIAGNOSIS:     A. Right ovarian cyst, right ovary, and right fallopian tube:          Ovary with serous cystadenofibroma and few benign cortical           epithelial inclusion cysts; no borderline or malignant features           identified.          Segment of fimbriated fallopian tube without histopathologic           abnormality.   B. Right ovarian cyst fluid:          No malignant cells identified.          Clusters and sheets of benign epithelial cells (some ciliated)           admixed with histiocytes and few lymphocytes, consistent with           ovarian cyst contents.   A/P    1. Encounter for postoperative care    2. Postoperative infection, unspecified type, initial encounter        S/p L/S RSO on 3/13/24 for persistent enlarged right ovarian cyst, doing well.     Incidental finding of delayed growth of fluid culture + for E. Coli and Bacteroides 5 days after the surgical procedure. Called lab 3/18 and staff does not think it was lab contamination of broth. Discussed with patient. Patient  clinically appears nontoxic with stable vitals and abdominal exam. Will treat possible underlying infection with PO Augmentin and flagyl x 10 days.     Advised patient to call or present to ER should she have fever, nausea and vomiting, abdominal distension, diarrhea, inability to pass flatus or have bowel movements or drainage/swelling from incision sites.     Postop precautions and limitations again discussed.     Follow up in 10 days for postop check.     Orders Placed This Encounter    amoxicillin-clavulanate (AUGMENTIN) 875-125 MG Tab    metroNIDAZOLE (FLAGYL) 500 MG Tab        Ish Zhou M.D.    Obstetrics and Gynecology    3/19/69759:19 AM

## 2024-03-28 ENCOUNTER — GYNECOLOGY VISIT (OUTPATIENT)
Dept: OBGYN | Facility: CLINIC | Age: 69
End: 2024-03-28
Payer: COMMERCIAL

## 2024-03-28 VITALS
HEIGHT: 67 IN | BODY MASS INDEX: 32.43 KG/M2 | WEIGHT: 206.6 LBS | DIASTOLIC BLOOD PRESSURE: 80 MMHG | SYSTOLIC BLOOD PRESSURE: 152 MMHG

## 2024-03-28 DIAGNOSIS — Z48.89 ENCOUNTER FOR POSTOPERATIVE CARE: ICD-10-CM

## 2024-03-28 PROCEDURE — 99024 POSTOP FOLLOW-UP VISIT: CPT | Performed by: OBSTETRICS & GYNECOLOGY

## 2024-03-28 PROCEDURE — 3077F SYST BP >= 140 MM HG: CPT | Performed by: OBSTETRICS & GYNECOLOGY

## 2024-03-28 PROCEDURE — 3079F DIAST BP 80-89 MM HG: CPT | Performed by: OBSTETRICS & GYNECOLOGY

## 2024-03-28 ASSESSMENT — FIBROSIS 4 INDEX: FIB4 SCORE: 0.85

## 2024-03-28 NOTE — PROGRESS NOTES
"Postoperative Follow Up Visit    Freddy Smith is a 68 y.o. female    Chief complaint    No chief complaint on file.      S/p L/S RSO on 3/13/24 for persistent enlarged right ovarian cyst, presenting for postop check  Incidental finding of delayed growth of fluid culture + for E. Coli and Bacteroides 5 days after the surgical procedure.   Patient was placed on 10 days of PO Augmentin and Flagyl.     COMPLAINTS:    Patient reports no fevers/chills, no pain, no constipation/diarrhea. Some soft stools since taking antibiotics. Has two more days of antibiotics. No problems voiding. Avoiding lifting heavy items and numerous stairs.       OBJECTIVE:    BP (!) 152/80 (BP Location: Right arm, Patient Position: Sitting, BP Cuff Size: Large adult)   Ht 5' 7\"   Wt 206 lb 9.6 oz   LMP 07/25/2011   BMI 32.36 kg/m²     General: No acute distress, well nourished, alert.     ABDOMEN: Soft nondistended, nontender, no peritoneal signs, no masses.     INCISION: Clean, dry, intact, no drainage, erythema or separation.    A/P    1. Encounter for postoperative care        S/p L/S RSO on 3/13/24 for persistent enlarged right ovarian cyst, presenting for postop check  Incidental finding of delayed growth of fluid culture + for E. Coli and Bacteroides 5 days after the surgical procedure.   Patient was placed on 10 days of PO Augmentin and Flagyl.     Again postop limitations and restrictions reviewed with patient in detail.     Follow up in 4 weeks for final postop check.     Ish Zhou M.D.    Obstetrics and Gynecology    3/28/565535:12 AM  "

## 2024-03-28 NOTE — PROGRESS NOTES
Post OP visit  Surgery date: 03/13/2024 (Laparoscopic Right Salpingoophorectomy  WT: 206.6 lb  BP: 152/80  Good # [phone]

## 2024-04-23 ENCOUNTER — GYNECOLOGY VISIT (OUTPATIENT)
Dept: OBGYN | Facility: CLINIC | Age: 69
End: 2024-04-23
Payer: COMMERCIAL

## 2024-04-23 VITALS — BODY MASS INDEX: 32.36 KG/M2 | SYSTOLIC BLOOD PRESSURE: 133 MMHG | WEIGHT: 206.6 LBS | DIASTOLIC BLOOD PRESSURE: 74 MMHG

## 2024-04-23 DIAGNOSIS — Z48.89 ENCOUNTER FOR POSTOPERATIVE CARE: ICD-10-CM

## 2024-04-23 PROCEDURE — 3075F SYST BP GE 130 - 139MM HG: CPT | Performed by: OBSTETRICS & GYNECOLOGY

## 2024-04-23 PROCEDURE — 99024 POSTOP FOLLOW-UP VISIT: CPT | Performed by: OBSTETRICS & GYNECOLOGY

## 2024-04-23 PROCEDURE — 3078F DIAST BP <80 MM HG: CPT | Performed by: OBSTETRICS & GYNECOLOGY

## 2024-04-23 ASSESSMENT — FIBROSIS 4 INDEX: FIB4 SCORE: 0.85

## 2024-04-23 NOTE — PROGRESS NOTES
Postoperative Follow Up Visit    Freddy Smith is a 68 y.o. female    Chief complaint    Chief Complaint   Patient presents with    Other     Post OP from 03/13/24        S/p L/S RSO on 3/13/24 for persistent enlarged right ovarian cyst presenting for final postop check.  Incidental finding of delayed growth of fluid culture + for E. Coli and Bacteroides 5 days after the surgical procedure.   Patient completed 10 days of PO Augmentin and Flagyl.    COMPLAINTS:    Denies fevers/chills. No pain. No constipation/diarrhea. No N/V. No problems with voiding.       OBJECTIVE:    /74 (BP Location: Left arm, Patient Position: Sitting, BP Cuff Size: Large adult)   Wt 206 lb 9.6 oz   LMP 07/25/2011   BMI 32.36 kg/m²     General: No acute distress, well nourished, alert.     ABDOMEN: Soft nondistended, nontender, no peritoneal signs, no masses.     INCISIONS: Clean, dry, intact, no drainage, erythema or separation. Well healed.     Pathology   3/13/24  SURGICAL PATHOLOGY CONSULTATION       FINAL DIAGNOSIS:     A. Right ovarian cyst, right ovary, and right fallopian tube:          Ovary with serous cystadenofibroma and few benign cortical           epithelial inclusion cysts; no borderline or malignant features           identified.          Segment of fimbriated fallopian tube without histopathologic           abnormality.   B. Right ovarian cyst fluid:          No malignant cells identified.          Clusters and sheets of benign epithelial cells (some ciliated)           admixed with histiocytes and few lymphocytes, consistent with           ovarian cyst contents.       A/P    1. Encounter for postoperative care        S/p L/S RSO on 3/13/24 for persistent enlarged right ovarian cyst doing well.   Incidental finding of delayed growth of fluid culture + for E. Coli and Bacteroides 5 days after the surgical procedure.   Patient completed 10 days of PO Augmentin and Flagyl.    May return to full  activities/exercise. Advised to follow up with PCP for routine health maintenance/exam.     RTC in 2-3 months for gyn annual exam.     Ish Zhou M.D.    Obstetrics and Gynecology    4/23/202411:32 AM

## 2024-07-11 ENCOUNTER — APPOINTMENT (RX ONLY)
Dept: URBAN - METROPOLITAN AREA CLINIC 6 | Facility: CLINIC | Age: 69
Setting detail: DERMATOLOGY
End: 2024-07-11

## 2024-07-11 DIAGNOSIS — L82.1 OTHER SEBORRHEIC KERATOSIS: ICD-10-CM

## 2024-07-11 DIAGNOSIS — T07XXXA INSECT BITE, NONVENOMOUS, OF OTHER, MULTIPLE, AND UNSPECIFIED SITES, WITHOUT MENTION OF INFECTION: ICD-10-CM

## 2024-07-11 DIAGNOSIS — Z71.89 OTHER SPECIFIED COUNSELING: ICD-10-CM

## 2024-07-11 PROBLEM — S90.561A INSECT BITE (NONVENOMOUS), RIGHT ANKLE, INITIAL ENCOUNTER: Status: ACTIVE | Noted: 2024-07-11

## 2024-07-11 PROCEDURE — ? COUNSELING

## 2024-07-11 PROCEDURE — 99213 OFFICE O/P EST LOW 20 MIN: CPT

## 2024-07-11 PROCEDURE — ? PRESCRIPTION MEDICATION MANAGEMENT

## 2024-07-11 ASSESSMENT — LOCATION SIMPLE DESCRIPTION DERM
LOCATION SIMPLE: LEFT FOREHEAD
LOCATION SIMPLE: RIGHT ANKLE

## 2024-07-11 ASSESSMENT — LOCATION DETAILED DESCRIPTION DERM
LOCATION DETAILED: RIGHT POSTERIOR ANKLE
LOCATION DETAILED: LEFT FOREHEAD

## 2024-07-11 ASSESSMENT — LOCATION ZONE DERM
LOCATION ZONE: LEG
LOCATION ZONE: FACE

## 2024-07-11 NOTE — PROCEDURE: PRESCRIPTION MEDICATION MANAGEMENT
Render In Strict Bullet Format?: No
Plan: Has triamcinolone at home - recommended use BID prn x 2-3 days if needed
Detail Level: Zone

## 2024-10-30 ENCOUNTER — OFFICE VISIT (OUTPATIENT)
Dept: MEDICAL GROUP | Facility: PHYSICIAN GROUP | Age: 69
End: 2024-10-30
Payer: COMMERCIAL

## 2024-10-30 VITALS
DIASTOLIC BLOOD PRESSURE: 80 MMHG | OXYGEN SATURATION: 99 % | WEIGHT: 215.9 LBS | HEIGHT: 67 IN | BODY MASS INDEX: 33.89 KG/M2 | TEMPERATURE: 99.8 F | HEART RATE: 60 BPM | SYSTOLIC BLOOD PRESSURE: 132 MMHG

## 2024-10-30 DIAGNOSIS — Z12.31 ENCOUNTER FOR SCREENING MAMMOGRAM FOR BREAST CANCER: ICD-10-CM

## 2024-10-30 DIAGNOSIS — E78.2 MIXED HYPERLIPIDEMIA: ICD-10-CM

## 2024-10-30 DIAGNOSIS — E66.9 OBESITY (BMI 30-39.9): ICD-10-CM

## 2024-10-30 DIAGNOSIS — Z00.00 ROUTINE HEALTH MAINTENANCE: ICD-10-CM

## 2024-10-30 DIAGNOSIS — E03.9 ACQUIRED HYPOTHYROIDISM: ICD-10-CM

## 2024-10-30 DIAGNOSIS — B35.1 ONYCHOMYCOSIS: ICD-10-CM

## 2024-10-30 DIAGNOSIS — E55.9 VITAMIN D DEFICIENCY: ICD-10-CM

## 2024-10-30 PROCEDURE — 99214 OFFICE O/P EST MOD 30 MIN: CPT | Performed by: NURSE PRACTITIONER

## 2024-10-30 PROCEDURE — 3075F SYST BP GE 130 - 139MM HG: CPT | Performed by: NURSE PRACTITIONER

## 2024-10-30 PROCEDURE — 3079F DIAST BP 80-89 MM HG: CPT | Performed by: NURSE PRACTITIONER

## 2024-10-30 RX ORDER — LEVOTHYROXINE SODIUM 150 UG/1
150 TABLET ORAL
Qty: 90 TABLET | Refills: 3 | Status: SHIPPED | OUTPATIENT
Start: 2024-10-30

## 2024-10-30 RX ORDER — ROSUVASTATIN CALCIUM 10 MG/1
10 TABLET, COATED ORAL EVERY EVENING
Qty: 90 TABLET | Refills: 3 | Status: SHIPPED | OUTPATIENT
Start: 2024-10-30

## 2024-10-30 ASSESSMENT — FIBROSIS 4 INDEX: FIB4 SCORE: 0.86

## 2024-11-03 ASSESSMENT — ENCOUNTER SYMPTOMS
WEAKNESS: 1
EYES NEGATIVE: 1
CARDIOVASCULAR NEGATIVE: 1
HEMATOLOGIC/LYMPHATIC NEGATIVE: 1
UNEXPECTED WEIGHT CHANGE: 1
ENDOCRINE NEGATIVE: 1
RESPIRATORY NEGATIVE: 1
ALLERGIC/IMMUNOLOGIC NEGATIVE: 1
ROS SKIN COMMENTS: ONYCHOMYCOSIS
ARTHRALGIAS: 1
ACTIVITY CHANGE: 1
GASTROINTESTINAL NEGATIVE: 1
PSYCHIATRIC NEGATIVE: 1

## 2024-11-03 NOTE — PROGRESS NOTES
Verbal consent was acquired by the patient to use [employer] ambient listening note generation during this visit Yes      Subjective   Freddy Smith is a 69 y.o. female who presents for:  History of Present Illness  The patient presents for medication refills.    She has misplaced her bottles of Synthroid and rosuvastatin and requires replacements.    She is also due for a mammogram.    She reports a period of inactivity which led to a loss of strength, but she is currently working on regaining it. She has gained weight, reaching 215 pounds, a weight she hasn't been in decades. She has resumed exercising, including using a pedaling machine for 15 minutes at a time, which she started 4 or 5 days ago.    She has a history of knee issues, which she manages with exercises recommended by another doctor 15 years ago. Despite the knee problem, she can lift 50 to 60 pounds without difficulty, but struggles with 80 to 100 pounds.    She has a fungal infection in one of her toes, for which she consulted a podiatrist.    She is due for an appointment with her OB/GYN, as it has been 6 months since her last visit. She has experienced some pain, which she plans to discuss with her OB/GYN. She had surgery 7 months ago and has been following all post-operative instructions, including avoiding heavy lifting.    FAMILY HISTORY  Her twin sister has gotten both of her knees replaced.    Review of Systems   Constitutional:  Positive for activity change and unexpected weight change.   Eyes: Negative.    Respiratory: Negative.     Cardiovascular: Negative.    Gastrointestinal: Negative.    Endocrine: Negative.    Genitourinary: Negative.    Musculoskeletal:  Positive for arthralgias.   Skin:         Onychomycosis   Allergic/Immunologic: Negative.    Neurological:  Positive for weakness.   Hematological: Negative.    Psychiatric/Behavioral: Negative.       Objective   /80 (BP Location: Left arm, Patient Position: Sitting, BP  "Cuff Size: Adult)   Pulse 60   Temp 37.7 °C (99.8 °F) (Temporal)   Ht 1.702 m (5' 7\")   Wt 97.9 kg (215 lb 14.4 oz)   SpO2 99%   Physical Exam  General: Well nourished, well developed female in NAD, awake and conversant.  Eyes: Normal conjunctiva, anicteric.  Round symmetrical pupils.  ENT: Hearing grossly intact.  No nasal discharge.  Neck: Neck is supple.  No masses or thyromegaly.  CV: No lower extremity edema.  Respiratory: Respirations are nonlabored.  No wheezing.  Abdomen: Non-Distended.  Skin: Warm.  No rashes or ulcers.  MSK: Normal ambulation.  No clubbing or cyanosis.  Neuro: Sensation and CN II-XII grossly normal.  Psych: Alert and oriented.  Cooperative, appropriate mood and affect, normal judgment.      Assessment & Plan  1. Acquired hypothyroidism  Chronic, ongoing.  Continue levothyroxine 150 mcg daily, refill sent to pharmacy. Due for updated annual labs prior to annual follow-up in February 2025.  - levothyroxine (SYNTHROID) 150 MCG Tab; Take 1 Tablet by mouth every morning on an empty stomach.  Dispense: 90 Tablet; Refill: 3  - T3 FREE; Future  - FREE THYROXINE; Future  - TSH; Future    2. Mixed hyperlipidemia  Chronic, ongoing.  Continue rosuvastatin 10 mg nightly, refill sent to pharmacy. Due for updated annual labs prior to annual follow-up in February 2025.  - rosuvastatin (CRESTOR) 10 MG Tab; Take 1 Tablet by mouth every evening.  Dispense: 90 Tablet; Refill: 3  - Comp Metabolic Panel; Future  - Lipid Profile; Future  - TSH; Future    3. Obesity (BMI 30-39.9)  Chronic, ongoing.  Patient reports feeling loss of strength, encourage patient to continue working on strength training.  Encourage diet high in fruits, vegetables, and fiber. And a diet low in salt, refined carbohydrates, cholesterol, saturated fat, and trans fatty acids.    Encourage a minimum of 30 minutes of moderate intensity aerobic exercise (eg, brisk walking) is recommended on five days each week. Or 30 minutes of " vigorous-intensity aerobic exercise (eg, jogging) on three days each week.   Patient's body mass index is 33.81 kg/m². Exercise and nutrition counseling were performed at this visit.  Due for updated annual labs prior to annual follow-up in February 2025.   - CBC WITH DIFFERENTIAL; Future  - Comp Metabolic Panel; Future  - Lipid Profile; Future  - TSH; Future    4. Vitamin D deficiency  Due for updated annual labs prior to annual follow-up in February 2025.  - VITAMIN D,25 HYDROXY (DEFICIENCY); Future    5. Onychomycosis  New to examiner. Daily application of athlete's foot cream is recommended, along with the use of tea tree oil.    6. Encounter for screening mammogram for breast cancer  Due for screening.  - MA-SCREENING MAMMO BILAT W/CAD; Future    7. Routine health maintenance  Due for updated annual labs prior to annual follow-up in February 2025.  Schedule follow-up with gyn.  - CBC WITH DIFFERENTIAL; Future  - Comp Metabolic Panel; Future  - Lipid Profile; Future  - VITAMIN D,25 HYDROXY (DEFICIENCY); Future  - T3 FREE; Future  - FREE THYROXINE; Future  - TSH; Future     Return in about 4 months (around 2/27/2025) for Preventative Annual, Follow up Labs.      Please note that this dictation was created using voice recognition software. I have made every reasonable attempt to correct obvious errors, but I expect that there are errors of grammar and possibly content that I did not discover before finalizing the note.

## 2024-11-08 ENCOUNTER — APPOINTMENT (OUTPATIENT)
Dept: RADIOLOGY | Facility: MEDICAL CENTER | Age: 69
End: 2024-11-08
Payer: COMMERCIAL

## 2024-11-08 ENCOUNTER — APPOINTMENT (OUTPATIENT)
Dept: RADIOLOGY | Facility: MEDICAL CENTER | Age: 69
End: 2024-11-08
Attending: STUDENT IN AN ORGANIZED HEALTH CARE EDUCATION/TRAINING PROGRAM
Payer: COMMERCIAL

## 2024-11-08 ENCOUNTER — HOSPITAL ENCOUNTER (EMERGENCY)
Facility: MEDICAL CENTER | Age: 69
End: 2024-11-08
Attending: STUDENT IN AN ORGANIZED HEALTH CARE EDUCATION/TRAINING PROGRAM
Payer: COMMERCIAL

## 2024-11-08 ENCOUNTER — OFFICE VISIT (OUTPATIENT)
Dept: URGENT CARE | Facility: PHYSICIAN GROUP | Age: 69
End: 2024-11-08
Payer: COMMERCIAL

## 2024-11-08 VITALS
TEMPERATURE: 98 F | BODY MASS INDEX: 33.91 KG/M2 | DIASTOLIC BLOOD PRESSURE: 90 MMHG | WEIGHT: 216.05 LBS | HEART RATE: 69 BPM | SYSTOLIC BLOOD PRESSURE: 130 MMHG | RESPIRATION RATE: 14 BRPM | HEIGHT: 67 IN | OXYGEN SATURATION: 98 %

## 2024-11-08 VITALS
SYSTOLIC BLOOD PRESSURE: 188 MMHG | HEART RATE: 61 BPM | DIASTOLIC BLOOD PRESSURE: 92 MMHG | RESPIRATION RATE: 16 BRPM | HEIGHT: 67 IN | TEMPERATURE: 98.1 F | OXYGEN SATURATION: 96 % | WEIGHT: 217.81 LBS | BODY MASS INDEX: 34.19 KG/M2

## 2024-11-08 DIAGNOSIS — R07.9 CHEST PAIN, UNSPECIFIED TYPE: ICD-10-CM

## 2024-11-08 DIAGNOSIS — R07.9 CHEST PAIN, UNSPECIFIED TYPE: Primary | ICD-10-CM

## 2024-11-08 LAB
ALBUMIN SERPL BCP-MCNC: 4.5 G/DL (ref 3.2–4.9)
ALBUMIN/GLOB SERPL: 1.5 G/DL
ALP SERPL-CCNC: 117 U/L (ref 30–99)
ALT SERPL-CCNC: 16 U/L (ref 2–50)
ANION GAP SERPL CALC-SCNC: 13 MMOL/L (ref 7–16)
AST SERPL-CCNC: 26 U/L (ref 12–45)
BASOPHILS # BLD AUTO: 0.4 % (ref 0–1.8)
BASOPHILS # BLD: 0.02 K/UL (ref 0–0.12)
BILIRUB SERPL-MCNC: 0.5 MG/DL (ref 0.1–1.5)
BUN SERPL-MCNC: 15 MG/DL (ref 8–22)
CALCIUM ALBUM COR SERPL-MCNC: 9.2 MG/DL (ref 8.5–10.5)
CALCIUM SERPL-MCNC: 9.6 MG/DL (ref 8.5–10.5)
CHLORIDE SERPL-SCNC: 102 MMOL/L (ref 96–112)
CO2 SERPL-SCNC: 25 MMOL/L (ref 20–33)
CREAT SERPL-MCNC: 0.78 MG/DL (ref 0.5–1.4)
EKG IMPRESSION: NORMAL
EOSINOPHIL # BLD AUTO: 0.09 K/UL (ref 0–0.51)
EOSINOPHIL NFR BLD: 1.8 % (ref 0–6.9)
ERYTHROCYTE [DISTWIDTH] IN BLOOD BY AUTOMATED COUNT: 43.3 FL (ref 35.9–50)
GFR SERPLBLD CREATININE-BSD FMLA CKD-EPI: 83 ML/MIN/1.73 M 2
GLOBULIN SER CALC-MCNC: 3 G/DL (ref 1.9–3.5)
GLUCOSE SERPL-MCNC: 91 MG/DL (ref 65–99)
HCT VFR BLD AUTO: 42.3 % (ref 37–47)
HGB BLD-MCNC: 14 G/DL (ref 12–16)
IMM GRANULOCYTES # BLD AUTO: 0.01 K/UL (ref 0–0.11)
IMM GRANULOCYTES NFR BLD AUTO: 0.2 % (ref 0–0.9)
LYMPHOCYTES # BLD AUTO: 1.18 K/UL (ref 1–4.8)
LYMPHOCYTES NFR BLD: 23.8 % (ref 22–41)
MCH RBC QN AUTO: 31.3 PG (ref 27–33)
MCHC RBC AUTO-ENTMCNC: 33.1 G/DL (ref 32.2–35.5)
MCV RBC AUTO: 94.6 FL (ref 81.4–97.8)
MONOCYTES # BLD AUTO: 0.35 K/UL (ref 0–0.85)
MONOCYTES NFR BLD AUTO: 7.1 % (ref 0–13.4)
NEUTROPHILS # BLD AUTO: 3.3 K/UL (ref 1.82–7.42)
NEUTROPHILS NFR BLD: 66.7 % (ref 44–72)
NRBC # BLD AUTO: 0 K/UL
NRBC BLD-RTO: 0 /100 WBC (ref 0–0.2)
NT-PROBNP SERPL IA-MCNC: 216 PG/ML (ref 0–125)
PLATELET # BLD AUTO: 311 K/UL (ref 164–446)
PMV BLD AUTO: 9.6 FL (ref 9–12.9)
POTASSIUM SERPL-SCNC: 3.7 MMOL/L (ref 3.6–5.5)
PROT SERPL-MCNC: 7.5 G/DL (ref 6–8.2)
RBC # BLD AUTO: 4.47 M/UL (ref 4.2–5.4)
SODIUM SERPL-SCNC: 140 MMOL/L (ref 135–145)
TROPONIN T SERPL-MCNC: 10 NG/L (ref 6–19)
TROPONIN T SERPL-MCNC: 11 NG/L (ref 6–19)
WBC # BLD AUTO: 5 K/UL (ref 4.8–10.8)

## 2024-11-08 PROCEDURE — 85025 COMPLETE CBC W/AUTO DIFF WBC: CPT

## 2024-11-08 PROCEDURE — 80053 COMPREHEN METABOLIC PANEL: CPT

## 2024-11-08 PROCEDURE — 99284 EMERGENCY DEPT VISIT MOD MDM: CPT

## 2024-11-08 PROCEDURE — 93000 ELECTROCARDIOGRAM COMPLETE: CPT | Performed by: PHYSICIAN ASSISTANT

## 2024-11-08 PROCEDURE — 36415 COLL VENOUS BLD VENIPUNCTURE: CPT

## 2024-11-08 PROCEDURE — 3080F DIAST BP >= 90 MM HG: CPT | Performed by: PHYSICIAN ASSISTANT

## 2024-11-08 PROCEDURE — 99214 OFFICE O/P EST MOD 30 MIN: CPT | Performed by: PHYSICIAN ASSISTANT

## 2024-11-08 PROCEDURE — 83880 ASSAY OF NATRIURETIC PEPTIDE: CPT

## 2024-11-08 PROCEDURE — 700102 HCHG RX REV CODE 250 W/ 637 OVERRIDE(OP): Performed by: STUDENT IN AN ORGANIZED HEALTH CARE EDUCATION/TRAINING PROGRAM

## 2024-11-08 PROCEDURE — 93005 ELECTROCARDIOGRAM TRACING: CPT | Performed by: STUDENT IN AN ORGANIZED HEALTH CARE EDUCATION/TRAINING PROGRAM

## 2024-11-08 PROCEDURE — 71045 X-RAY EXAM CHEST 1 VIEW: CPT

## 2024-11-08 PROCEDURE — 84484 ASSAY OF TROPONIN QUANT: CPT | Mod: 91

## 2024-11-08 PROCEDURE — 93005 ELECTROCARDIOGRAM TRACING: CPT

## 2024-11-08 PROCEDURE — 3075F SYST BP GE 130 - 139MM HG: CPT | Performed by: PHYSICIAN ASSISTANT

## 2024-11-08 PROCEDURE — A9270 NON-COVERED ITEM OR SERVICE: HCPCS | Performed by: STUDENT IN AN ORGANIZED HEALTH CARE EDUCATION/TRAINING PROGRAM

## 2024-11-08 RX ORDER — ASPIRIN 325 MG
325 TABLET ORAL ONCE
Status: COMPLETED | OUTPATIENT
Start: 2024-11-08 | End: 2024-11-08

## 2024-11-08 RX ADMIN — ASPIRIN 325 MG: 325 TABLET ORAL at 18:21

## 2024-11-08 ASSESSMENT — HEART SCORE
ECG: NON-SPECIFIC REPOLARIZATION DISTURBANCE
AGE: 65+
HISTORY: MODERATELY SUSPICIOUS
TROPONIN: LESS THAN OR EQUAL TO NORMAL LIMIT
RISK FACTORS: 1-2 RISK FACTORS
HEART SCORE: 5

## 2024-11-08 ASSESSMENT — FIBROSIS 4 INDEX
FIB4 SCORE: 0.86
FIB4 SCORE: 0.86

## 2024-11-08 NOTE — PROGRESS NOTES
Subjective:   Freddy Smith is a 69 y.o. female who presents for Chest Pain (Hx of right side near shoulder, /Current left lower quadrant pain /Right upper quadrant pain radiating to right upper back right between shoulder and spine  )     Sx onset 2-3 days ago with mild left lower quadrant abdominal pain which has subsequently resolved. Yesterday developed left sided chest pain that radiates to the left scapula.  2 years ago worked up for similar right-sided chest pain.  ER w/u was negative.  Chest pain is uncomfortable, it radiates to the back.  She does have some discomfort in the left shoulder and arm but states this is chronic for her.  She denies nausea or vomiting.  She does endorse slightly new or shortness of breath with exertion.  She has no known history of coronary artery disease but does have an elevated coronary artery calcification score.  She has a history of hyperlipidemia.  Her blood pressure is elevated today.  She denies diaphoresis headache numbness dizziness or abdominal pain.  She denies musculoskeletal chest wall pain or strain.  She denies GERD symptoms.                Review of Systems   Cardiovascular:  Positive for chest pain.       Medications:  CINNAMON PO  GUGGULIPID-BLACK PEPPER PO  levothyroxine Tabs  rosuvastatin Tabs  TURMERIC PO    Allergies:             Patient has no known allergies.    Surgical History:         Past Surgical History:   Procedure Laterality Date    MO LAP,DIAGNOSTIC ABDOMEN Right 3/13/2024    Procedure: OPERATIVE LAPAROSCOPY, DRAINAGE AND EXCISION OF RIGHT OVARIAN CYST,  RIGHT SALPINGECTOMY AND OOPHORECTOMY,;  Surgeon: Ish Zhou M.D.;  Location: SURGERY SAME DAY [facility];  Service: Gynecology    VENTRAL HERNIA REPAIR  9/7/2011    Performed by SAVANAH QUIÑONES at SURGERY [facility] ORS    PANNICULECTOMY  9/7/2011    Performed by SAVANAH QUIÑONES at SURGERY [facility] ORS    OTHER ABDOMINAL SURGERY  1990/2001/2006     incisional hernia repair       Past  "Social Hx:  Freddy Smith  reports that she has never smoked. She has been exposed to tobacco smoke. She has never used smokeless tobacco. She reports that she does not currently use drugs. She reports that she does not drink alcohol.     Past Family Hx:   Freddy Smith family history includes Alcohol/Drug in her brother and brother; Cancer in her mother; Diabetes in her mother; Heart Attack in her mother and paternal grandmother; Heart Disease in her mother; No Known Problems in her maternal grandfather, maternal grandmother, sister, and sister; Other in her father, paternal aunt, and paternal grandfather.       Problem list, medications, and allergies reviewed by myself today in Epic.     Objective:     BP (!) 130/90 (BP Location: Right arm, Patient Position: Sitting, BP Cuff Size: Adult)   Pulse 69   Temp 36.7 °C (98 °F) (Temporal)   Resp 14   Ht 1.702 m (5' 7\")   Wt 98 kg (216 lb 0.8 oz)   LMP 07/25/2011   SpO2 98%   BMI 33.84 kg/m²     Physical Exam  Vitals and nursing note reviewed.   Constitutional:       General: She is not in acute distress.     Appearance: Normal appearance. She is not ill-appearing.   HENT:      Head: Normocephalic.   Eyes:      Extraocular Movements: Extraocular movements intact.      Pupils: Pupils are equal, round, and reactive to light.   Cardiovascular:      Rate and Rhythm: Normal rate and regular rhythm.      Chest Wall: PMI is not displaced. No thrill.      Heart sounds: Normal heart sounds.      Comments: Regular rate and rhythm.  No murmurs gallops or rubs  Pulmonary:      Effort: Pulmonary effort is normal.      Breath sounds: Normal breath sounds. No stridor. No decreased breath sounds, wheezing, rhonchi or rales.      Comments: Lungs CTA bilaterally, no rhonchi rales or wheezes.  Chest:      Comments: No reproducible chest wall tenderness  Abdominal:      Tenderness: There is no abdominal tenderness.      Comments: Abdomen soft without guarding or " rebound.  Negative McBurney's.  Negative Heath's.  No peritoneal signs   Skin:     General: Skin is warm.      Findings: No rash.   Neurological:      Mental Status: She is alert and oriented to person, place, and time.   Psychiatric:         Thought Content: Thought content normal.         Judgment: Judgment normal.       EKG, sinus rhythm rate 63, interpreted as anterior septal infarct old however this is stable as compared to previous ECGs and does not appear to be true Q wave.  No obvious ST elevation or depression.  Some T wave flattening seen in aVF  Assessment/Plan:     Diagnosis and Associated Orders:     1. Chest pain, unspecified type  - EKG - Clinic Performed        Comments/MDM:  This differential diagnosis is broad and complex, and includes, but is not limited to: acute coronary syndrome, costochondritis, musculoskeletal pain, pneumonia, pneumothorax, pericarditis, pericardial effusion, gastroesophageal reflux disease, anxiety, esophageal spasm or rupture; considered but less likely: aortic dissection (symmetric pulses and no radiation to back), pulmonary embolism  EKG demonstrates no acute changes.  Lungs are clear to auscultation bilaterally.  There is no reproducible chest wall tenderness.  This is a new symptom for the patient.  She does have a history of hyperlipidemia and coronary artery calcification seen on CT scan.  At this time I feel she needs higher level of care for more comprehensive workup.  Vital signs otherwise stable with slightly elevated blood pressure reading in the clinic today.  She will present to [facility] ER with her son.  Transfer center contacted.    I personally reviewed prior external notes and test results pertinent to today's visit. Supportive care, natural history, differential diagnoses, and indications for immediate follow-up discussed. Return to clinic or go to ED if symptoms worsen or persist.  Red flag symptoms discussed.  Patient/Parent/Guardian voices understanding.  Follow-up with your primary care provider in 3-5 days.  All side effects of medication discussed including allergic response, GI upset, tendon injury, rash, sedation etc    Please note that this dictation was created using voice recognition software. I have made a reasonable attempt to correct obvious errors, but I expect that there are errors of grammar and possibly content that I did not discover before finalizing the note.    This note was electronically signed by Maria Del Carmen Grossman PA-C

## 2024-11-09 NOTE — ED PROVIDER NOTES
ED Provider Note    CHIEF COMPLAINT  Chief Complaint   Patient presents with    Chest Pain     Pt reports LLQ ABD pain x3 days that has since migrated to left chest and left upper back yesterday. Pt denies SOB or syncopal episode.        EXTERNAL RECORDS REVIEWED  Outpatient Notes from cardiology in which patient is history of coronary artery calcification seen on coronary CT, hyperlipidemia, abnormal EKG.  Also reviewed outpatient echocardiogram from 11/27/2023 with showed left ventricular ejection fraction of 65%, normal systolic function, normal right heart pressures.    HPI/ROS  LIMITATION TO HISTORY   Select: : None  OUTSIDE HISTORIAN(S):  None    Freddy Smith is a 69 y.o. female who presents to the ED with chest pain onset 1 day ago. The patient describes that she developed left upper chest pain yesterday and then developed left upper back pain today. The patient describes her chest pain as a sharp pain and note that it at times radiates to her left ear. Her back pain reportedly feels like a stabbing pain in between her shoulder blades. The patient notes that she has never had a pain like this before. She denies coughing, fever, lightheadedness, dizziness, syncope or lower extremity edema. This new development of back pain prompted the patient to go to Urgent Care. The patient had an EKG done at Urgent care which showed no acute changes, so they recommended she be seen at the emergency department if her symptoms worsen.   The patient mentions that she was taking Asprin in the past, but stopped taking it as prescribed. She notes that she did take aspirin yesterday for the pain, which alleviated it momentarily, but states that it came back. She reports a dermoid cyst removal in March 2023.  The patient is taking rosuvastatin. She notes that she has had stress tests done in the past. She is followed by Dr. Freitas. She denies the use of tobacco. The patient has a family history of MI.     PAST MEDICAL  "HISTORY   has a past medical history of Arthritis, Elevated fasting glucose (01/09/2017), Gynecological disorder (02/15/2024), Heart murmur, High cholesterol, Hyperlipidemia, Postmenopausal (2011), Right foot pain (11/21/2018), Unspecified disorder of thyroid, and Vitamin D deficiency.    SURGICAL HISTORY   has a past surgical history that includes other abdominal surgery (1990/2001/2006); ventral hernia repair (9/7/2011); panniculectomy (9/7/2011); and lap,diagnostic abdomen (Right, 3/13/2024).    FAMILY HISTORY  Family History   Problem Relation Age of Onset    Heart Disease Mother     Cancer Mother         breast    Diabetes Mother     Heart Attack Mother     Other Father         AAA    No Known Problems Sister     Alcohol/Drug Brother     Alcohol/Drug Brother     No Known Problems Sister     Other Paternal Aunt         MS    No Known Problems Maternal Grandmother     No Known Problems Maternal Grandfather     Heart Attack Paternal Grandmother         Thoracic Aneurysm    Other Paternal Grandfather         TB     SOCIAL HISTORY  Social History     Tobacco Use    Smoking status: Never     Passive exposure: Past    Smokeless tobacco: Never   Vaping Use    Vaping status: Never Used   Substance and Sexual Activity    Alcohol use: No    Drug use: Not Currently     Comment: maricara 1960s    Sexual activity: Yes     Partners: Male     Birth control/protection: Post-Menopausal     Comment:  38 years     CURRENT MEDICATIONS  Home Medications       Reviewed by Sadaf Urban R.N. (Registered Nurse) on 11/08/24 at 1721  Med List Status: Partial     Medication Last Dose Status   CINNAMON PO  Active   GUGGULIPID-BLACK PEPPER PO  Active   levothyroxine (SYNTHROID) 150 MCG Tab  Active   rosuvastatin (CRESTOR) 10 MG Tab  Active   TURMERIC PO  Active                  ALLERGIES  No Known Allergies    PHYSICAL EXAM  VITAL SIGNS: BP (!) 190/97   Pulse 74   Temp 36.7 °C (98.1 °F) (Temporal)   Resp 17   Ht 1.702 m (5' 7\")  "  Wt 98.8 kg (217 lb 13 oz)   LMP 2011   SpO2 96%   BMI 34.11 kg/m²      Constitutional: Awake and alert  HENT: Normal inspection  Eyes: Normal inspection  Neck: Grossly normal range of motion.  Cardiovascular: Normal heart rate, Normal rhythm.  Symmetric peripheral pulses.   Thorax & Lungs: No respiratory distress, No wheezing, No rales, No rhonchi, No chest tenderness.   Abdomen: Bowel sounds normal, soft, non-distended, nontender, no mass  Skin: No obvious rash.  Back: No tenderness, No CVA tenderness.   Extremities: No clubbing, cyanosis, edema, no Homans or cords.  Neurologic: Grossly normal   Psychiatric: Normal for situation      EKG/LABS  Labs Reviewed   COMP METABOLIC PANEL - Abnormal; Notable for the following components:       Result Value    Alkaline Phosphatase 117 (*)     All other components within normal limits   PROBRAIN NATRIURETIC PEPTIDE, NT - Abnormal; Notable for the following components:    NT-proBNP 216 (*)     All other components within normal limits   CBC WITH DIFFERENTIAL   TROPONIN   TROPONIN   ESTIMATED GFR        Results for orders placed or performed during the hospital encounter of 24   EKG   Result Value Ref Range    Report       [facility] Emergency Dept.    Test Date:  2024  Pt Name:    CAITY SOLIS                Department: ER  MRN:        [id number]                      Room:  Gender:     Female                       Technician: [id number]  :        [id number]                   Requested By:ER TRIAGE PROTOCOL  Order #:    [id number]                    Reading MD:    Measurements  Intervals                                Axis  Rate:       61                           P:          51  IL:         165                          QRS:        -3  QRSD:       121                          T:          36  QT:         449  QTc:        453    Interpretive Statements  Sinus rhythm  Left bundle branch block  Compared to ECG 2024 13:48:03  Left  bundle-branch block now present  Sinus bradycardia no longer present  Atrial premature complex(es) no longer present        I have independently interpreted this EKG        RADIOLOGY/PROCEDURES   I have independently interpreted the diagnostic imaging associated with this visit and am waiting the final reading from the radiologist.   My preliminary interpretation is as follows: No evidence of wide mediastinum, no focal consolidation or pneumothorax    Radiologist interpretation:  DX-CHEST-PORTABLE (1 VIEW)   Final Result         1. No acute cardiopulmonary abnormalities are identified.          COURSE & MEDICAL DECISION MAKING    ASSESSMENT, COURSE AND PLAN  Care Narrative: Patient is a 69-year-old female with history of coronary calcifications, hyperlipidemia, family history of coronary artery disease presented to the emergency department chief complaint of sharp left-sided chest pain that radiates to her back.  No associated shortness of breath, nausea or diaphoresis.  Patient has follow-up outpatient with cardiologist Dr. Coronado.  She states that she takes a statin, does not take any end of the blood thinners, did not take any aspirin today.  Patient states that the pain has been going on for approximately a day and a half, intermittent.  She denies any exertional symptoms, positional symptoms.  No recent surgery, travel, stasis.  No unilateral or bilateral lower extremity swelling.  On exam patient has normal heart and lung sounds, no respiratory distress, no unilateral bilateral lower extremity swelling.  No clinical signs of DVT patient does not take any hormones.  Patient was mildly hypertensive but otherwise hemodynamically stable.  Will treat with full dose aspirin, serial troponins, CBC, CMP, BNP, chest x-ray and EKG.    EKG shows bundle branch block which is new compared to previous EKG 9 months ago.  Patient reassessed and was chest pain-free.     CHEST PAIN:   HEART Score for Major Cardiac  Events  HEART Score     History: Moderately suspicious  ECG: Non-specific repolarization disturbance  Age: 65+  Risk Factors: 1-2 risk factors  Troponin: Less than or equal to normal limit    Heart Score: 5    Total Score   0-3 Points = Low Score, risk of MACE 0.9-1.7%.  4-6 Points = Moderate Score, risk of MACE 12-16.6%  7-10 Points = High Score, risk of MACE 50-65%    5:56 PM - Patient seen and examined at bedside. The patient is a 69 year old female who presents with chest pain. I discussed plan of care, including performing lab work and imaging for evaluation. Patient agrees to the plan of care.  Patient will be treated with aspirin 325 mg.     Patient's labs reviewed, no significant leukocytosis or anemia, mildly elevated BNP, troponin normal x 2 with no significant change between 2 measurements.  Metabolic panel largely normal.     8:55 PM - Patient was reevaluated at bedside. Discussed lab and radiology results with the patient and discussed discharge instructions and return precautions with the patient and they were cleared for discharge. Patient was given the opportunity to ask any further questions. She is comfortable with discharge at this time.        Differential diagnosis considered.  Doubt aortic dissection, pneumothorax, focal consolidation, doubt PE.  Considered ACS, patient troponin negative x 2.  Patient has mildly elevated heart score of approximately 5.  Had shared discussion with patient regarding inpatient versus outpatient management.  Patient states that she does not wish to stay in the hospital at this time and wishes to follow-up with her outpatient primary care doctor and cardiologist.  Patient given strict return precautions and anticipatory guidance, will return to the emergency department should she have any returning symptoms.      ADDITIONAL PROBLEMS MANAGED  None    DISPOSITION AND DISCUSSIONS  I have discussed management of the patient with the following physicians and [place]'s:  None    Discussion of management with other [facility] or appropriate source(s): None     Escalation of care considered, and ultimately not performed:after discussion with the patient / family, they have elected to decline an escalation in care    Barriers to care at this time, including but not limited to:  None .     Decision tools and prescription drugs considered including, but not limited to:  Heart score .    The patient will return for new or worsening symptoms and is stable at the time of discharge.      DISPOSITION:  Patient will be discharged home in stable condition.    FOLLOW UP:  LUIS A Sawant  [address] [facility] [id number] [phone]    Schedule an appointment as soon as possible for a visit       Ulises Schofield M.D.  [address]  [address]  [facility] [id number] [phone]    Schedule an appointment as soon as possible for a visit         FINAL DIAGNOSIS  1. Chest pain, unspecified type Acute      I, Allyson Navarro (Uli), am scribing for, and in the presence of, Travis Delgado M.D..    Electronically signed by: Allyson Navarro (Uli), 11/8/2024    ITravis M.D. personally performed the services described in this documentation, as scribed by Allyson Navarro in my presence, and it is both accurate and complete.       Electronically signed by: Travis Delgado M.D., 11/8/2024 5:56 PM

## 2024-11-09 NOTE — ED NOTES
Discharge instructions given to patient, a verbal understanding of all instructions was stated. Pt preferred to walk out accompanied by family VSS, all belongings accounted for.

## 2024-11-09 NOTE — DISCHARGE INSTRUCTIONS
You are seen evaluated emergency department with chest pain.  He received a full dose aspirin had resolution of your symptoms.  Your cardiac enzymes were normal x 2.  EKG did not show any ischemic changes.  Your chest x-ray and other labs were largely normal as well.  We had shared discussion about inpatient versus outpatient management.  You have elected for outpatient management at this time.  I do recommend following up with your PCP and/or your cardiologist for completion of this workup.  If you have any other concerning symptoms or recurrence of your chest pain please return to the emergency department for further evaluation.

## 2024-11-09 NOTE — ED TRIAGE NOTES
.  Chief Complaint   Patient presents with    Chest Pain     Pt reports LLQ ABD pain x3 days that has since migrated to left chest and left upper back yesterday. Pt denies SOB or syncopal episode.

## 2024-11-13 ENCOUNTER — OFFICE VISIT (OUTPATIENT)
Dept: CARDIOLOGY | Facility: MEDICAL CENTER | Age: 69
End: 2024-11-13
Attending: NURSE PRACTITIONER
Payer: COMMERCIAL

## 2024-11-13 VITALS
HEART RATE: 61 BPM | SYSTOLIC BLOOD PRESSURE: 138 MMHG | OXYGEN SATURATION: 98 % | BODY MASS INDEX: 33.59 KG/M2 | DIASTOLIC BLOOD PRESSURE: 80 MMHG | RESPIRATION RATE: 16 BRPM | WEIGHT: 214 LBS | HEIGHT: 67 IN

## 2024-11-13 DIAGNOSIS — R94.31 ABNORMAL EKG: ICD-10-CM

## 2024-11-13 DIAGNOSIS — I25.10 CORONARY ARTERY CALCIFICATION SEEN ON CAT SCAN: ICD-10-CM

## 2024-11-13 DIAGNOSIS — E78.2 MIXED HYPERLIPIDEMIA: ICD-10-CM

## 2024-11-13 PROCEDURE — 99211 OFF/OP EST MAY X REQ PHY/QHP: CPT | Performed by: NURSE PRACTITIONER

## 2024-11-13 PROCEDURE — 99214 OFFICE O/P EST MOD 30 MIN: CPT | Performed by: NURSE PRACTITIONER

## 2024-11-13 PROCEDURE — 3079F DIAST BP 80-89 MM HG: CPT | Performed by: NURSE PRACTITIONER

## 2024-11-13 PROCEDURE — 3075F SYST BP GE 130 - 139MM HG: CPT | Performed by: NURSE PRACTITIONER

## 2024-11-13 ASSESSMENT — ENCOUNTER SYMPTOMS
SHORTNESS OF BREATH: 0
NERVOUS/ANXIOUS: 1
FALLS: 0
COUGH: 0
DIZZINESS: 0
DOUBLE VISION: 0
WHEEZING: 0
LOSS OF CONSCIOUSNESS: 0
FOCAL WEAKNESS: 0
BLURRED VISION: 0
HEADACHES: 0
ORTHOPNEA: 0
WEAKNESS: 0
PALPITATIONS: 0

## 2024-11-13 ASSESSMENT — FIBROSIS 4 INDEX: FIB4 SCORE: 1.4

## 2024-11-13 NOTE — PROGRESS NOTES
Chief Complaint   Patient presents with    Hyperlipidemia     F/V Dx: Mixed hyperlipidemia     Coronary Artery Disease     F/V Dx: Coronary artery calcification seen on CAT scan       Subjective     Freddy Smith is a 69 y.o. female who presents today for hospital follow up     She is followed by Dr. Schofield in our clinic, history of coronary calcifications, systolic heart murmur and dyslipidemia. The patient has significant family history of thoracic aneurysm in both her father and paternal grandmother and her mother suffered a heart attack in her 50s in addition to hypothyroidism.     ED visit 11/8/2024 chest pain. Trop normal. She was recommend to follow up for stress test. eKG showed LBBB, which is not new.     She had no recurring chest pain/pressure. She is anxious since her family has strong history of CAD and TAA.     Past Medical History:   Diagnosis Date    Arthritis     Elevated fasting glucose 01/09/2017    Gynecological disorder 02/15/2024    Heart murmur     was told this at some point in the past    High cholesterol     Hyperlipidemia     Postmenopausal 2011    Right foot pain 11/21/2018    Unspecified disorder of thyroid     Vitamin D deficiency      Past Surgical History:   Procedure Laterality Date    CT LAP,DIAGNOSTIC ABDOMEN Right 3/13/2024    Procedure: OPERATIVE LAPAROSCOPY, DRAINAGE AND EXCISION OF RIGHT OVARIAN CYST,  RIGHT SALPINGECTOMY AND OOPHORECTOMY,;  Surgeon: Ish Zhou M.D.;  Location: SURGERY SAME DAY [facility];  Service: Gynecology    VENTRAL HERNIA REPAIR  9/7/2011    Performed by SAVANAH QUIÑONES at SURGERY [facility] ORS    PANNICULECTOMY  9/7/2011    Performed by SAVANAH QUIÑONES at SURGERY [facility] ORS    OTHER ABDOMINAL SURGERY  1990/2001/2006     incisional hernia repair     Family History   Problem Relation Age of Onset    Heart Disease Mother     Cancer Mother         breast    Diabetes Mother     Heart Attack Mother     Other Father         AAA    No Known Problems  Sister     Alcohol/Drug Brother     Alcohol/Drug Brother     No Known Problems Sister     Other Paternal Aunt         MS    No Known Problems Maternal Grandmother     No Known Problems Maternal Grandfather     Heart Attack Paternal Grandmother         Thoracic Aneurysm    Other Paternal Grandfather         TB     Social History     Socioeconomic History    Marital status:      Spouse name: Not on file    Number of children: Not on file    Years of education: Not on file    Highest education level: Not on file   Occupational History    Not on file   Tobacco Use    Smoking status: Never     Passive exposure: Past    Smokeless tobacco: Never   Vaping Use    Vaping status: Never Used   Substance and Sexual Activity    Alcohol use: No    Drug use: Not Currently     Comment: maritza 1960s    Sexual activity: Yes     Partners: Male     Birth control/protection: Post-Menopausal     Comment:  38 years   Other Topics Concern    Not on file   Social History Narrative    Not on file     Social Drivers of Health     Financial Resource Strain: Not on file   Food Insecurity: Not on file   Transportation Needs: Not on file   Physical Activity: Not on file   Stress: Not on file   Social Connections: Not on file   Intimate Partner Violence: Not on file   Housing Stability: Not on file     No Known Allergies  Outpatient Encounter Medications as of 11/13/2024   Medication Sig Dispense Refill    levothyroxine (SYNTHROID) 150 MCG Tab Take 1 Tablet by mouth every morning on an empty stomach. 90 Tablet 3    rosuvastatin (CRESTOR) 10 MG Tab Take 1 Tablet by mouth every evening. 90 Tablet 3    TURMERIC PO Take  by mouth.      CINNAMON PO Take  by mouth.      GUGGULIPID-BLACK PEPPER PO Take  by mouth.       No facility-administered encounter medications on file as of 11/13/2024.     Review of Systems   Constitutional:  Negative for malaise/fatigue.   Eyes:  Negative for blurred vision and double vision.   Respiratory:  Negative  "for cough, shortness of breath and wheezing.    Cardiovascular:  Negative for chest pain, palpitations, orthopnea and leg swelling.   Musculoskeletal:  Negative for falls.   Neurological:  Negative for dizziness, focal weakness, loss of consciousness, weakness and headaches.   Psychiatric/Behavioral:  The patient is nervous/anxious.    All other systems reviewed and are negative.             Objective     /80 (BP Location: Left arm, Patient Position: Sitting, BP Cuff Size: Adult)   Pulse 61   Resp 16   Ht 1.702 m (5' 7\")   Wt 97.1 kg (214 lb)   LMP 07/25/2011   SpO2 98%   BMI 33.52 kg/m²     Physical Exam  Constitutional:       General: She is not in acute distress.     Appearance: She is well-developed. She is not diaphoretic.   HENT:      Head: Normocephalic and atraumatic.   Eyes:      Pupils: Pupils are equal, round, and reactive to light.   Neck:      Vascular: No JVD.   Cardiovascular:      Rate and Rhythm: Normal rate and regular rhythm.      Heart sounds: Normal heart sounds.   Pulmonary:      Effort: Pulmonary effort is normal.      Breath sounds: Normal breath sounds.   Abdominal:      General: Bowel sounds are normal. There is no distension.      Palpations: Abdomen is soft.   Skin:     General: Skin is warm and dry.   Neurological:      Mental Status: She is alert and oriented to person, place, and time.   Psychiatric:         Behavior: Behavior normal.         Thought Content: Thought content normal.         Judgment: Judgment normal.            ECHO  11/28/2023  Normal left ventricular systolic function.  The left ventricular ejection fraction is visually estimated to be   6570%.  Normal right ventricular size and systolic function.  Right heart pressures are normal.  No valvular abnormalities.   No prior study is available for comparison.     Assessment & Plan     1. Coronary artery calcification seen on CAT scan  NM-CARDIAC STRESS TEST      2. Abnormal EKG  NM-CARDIAC STRESS TEST      3. " Mixed hyperlipidemia            Medical Decision Making: Today's Assessment/Status/Plan:      1. Elevated coronary calcium score   Abnormal EKG   Hyperlipidemia   - plan for stress test to rule out ischemia   - continue statin therapy LDL 72 (rosuvastatin 10mg qd )    Follow up after stress test, sooner as needed.

## 2024-11-14 ENCOUNTER — HOSPITAL ENCOUNTER (OUTPATIENT)
Dept: RADIOLOGY | Facility: MEDICAL CENTER | Age: 69
End: 2024-11-14
Attending: NURSE PRACTITIONER
Payer: COMMERCIAL

## 2024-11-14 DIAGNOSIS — R94.31 ABNORMAL EKG: ICD-10-CM

## 2024-11-14 DIAGNOSIS — I25.10 CORONARY ARTERY CALCIFICATION SEEN ON CAT SCAN: ICD-10-CM

## 2024-11-14 PROCEDURE — 78452 HT MUSCLE IMAGE SPECT MULT: CPT | Mod: 26 | Performed by: INTERNAL MEDICINE

## 2024-11-14 PROCEDURE — 93018 CV STRESS TEST I&R ONLY: CPT | Performed by: INTERNAL MEDICINE

## 2024-11-14 PROCEDURE — A9502 TC99M TETROFOSMIN: HCPCS

## 2024-11-14 PROCEDURE — 700111 HCHG RX REV CODE 636 W/ 250 OVERRIDE (IP)

## 2024-11-14 RX ORDER — REGADENOSON 0.08 MG/ML
INJECTION, SOLUTION INTRAVENOUS
Status: COMPLETED
Start: 2024-11-14 | End: 2024-11-14

## 2024-11-14 RX ORDER — AMINOPHYLLINE 25 MG/ML
100 INJECTION, SOLUTION INTRAVENOUS
Status: DISCONTINUED | OUTPATIENT
Start: 2024-11-14 | End: 2024-11-15 | Stop reason: HOSPADM

## 2024-11-14 RX ORDER — REGADENOSON 0.08 MG/ML
0.4 INJECTION, SOLUTION INTRAVENOUS ONCE
Status: COMPLETED | OUTPATIENT
Start: 2024-11-14 | End: 2024-11-14

## 2024-11-14 RX ADMIN — REGADENOSON 0.4 MG: 0.08 INJECTION, SOLUTION INTRAVENOUS at 15:10

## 2024-11-15 ENCOUNTER — TELEPHONE (OUTPATIENT)
Dept: CARDIOLOGY | Facility: MEDICAL CENTER | Age: 69
End: 2024-11-15
Payer: COMMERCIAL

## 2024-11-15 DIAGNOSIS — R93.1 ABNORMAL NUCLEAR CARDIAC IMAGING TEST: ICD-10-CM

## 2024-11-15 DIAGNOSIS — R94.31 ABNORMAL EKG: ICD-10-CM

## 2024-11-15 DIAGNOSIS — I25.10 CORONARY ARTERY CALCIFICATION SEEN ON CAT SCAN: ICD-10-CM

## 2024-11-15 RX ORDER — ASPIRIN 81 MG/1
81 TABLET ORAL DAILY
Qty: 100 TABLET | Refills: 3 | Status: SHIPPED | OUTPATIENT
Start: 2024-11-15

## 2024-11-15 RX ORDER — METOPROLOL TARTRATE 25 MG/1
50 TABLET, FILM COATED ORAL ONCE
Qty: 1 TABLET | Refills: 0 | Status: SHIPPED | OUTPATIENT
Start: 2024-11-15 | End: 2024-11-15

## 2024-11-15 NOTE — TELEPHONE ENCOUNTER
----- Message from Nurse Practitioner LUIS A Pickett sent at 11/15/2024 10:34 AM PST -----  Looks like she might had heart attack with the infarct showing in the stress test.  No ischemia noted. Start asa 81mg qd. Please call her with the result.     Thank you   RT

## 2024-11-15 NOTE — TELEPHONE ENCOUNTER
SW: pt of yours, LV 11/13/24 with RT. She is asking if you could review her NM-Cardiac Stress Test results.    Phone Number Called: [phone]    Call outcome: Spoke to patient regarding message below.    Message: Called to provide RTs recommendations on stress test. Pt says that she had discussed something similar with SW previously and is wanting to know if it is a new finding. OV 8/14/23 with SW mentions abnormal EKG with anterior infarction pattern. We discussed aspirin 81mg qd and pt had taken before, but is very sensitive and had stopped due to bleeding. She will resume with every other day and then if needed, change to every 3rd day.

## 2024-11-16 NOTE — TELEPHONE ENCOUNTER
Called patient about abnormal nuclear stress results 11/14/2024     Abnormal myocardial perfusion with concerns of infarct in the mid to apical    inferior segments, without evidence of ischemia. SDS 0   Normal estimated stress LVEF 63%   Normal/negative TID 1.22 (<1.3 is considered to be negative with    pharmacologic administration)   ECG INTERPRETATION   Negative stress EKG for ischemia with pharmacologic administration, limited    sensitivity   Rare PVCs    She was seen in the [facility] ER 11/8/2024 for chest pain symptoms.  Troponin levels were normal.  EKG shows sinus rhythm intraventricular conduction delay early anterior Q waves and ST-T wave abnormalities.  She was seen in cardiology clinic 11/13/2024.  An MPI was ordered on 11/14/2024 with the above results.  The patient is having no exertional chest pain.  She has known coronary calcification on rosuvastatin 10 mg daily with most recent LDL of 72 and 2023.  Prior echocardiogram in 2023 was normal.  Plan:  Outpatient coronary CTA pretreated with metoprolol  Start aspirin 81 mg daily the patient states that this is previously caused excessive bruising and will take it every third day  Increase rosuvastatin to 20 mg daily  Repeat lipid panel

## 2025-01-02 DIAGNOSIS — Z01.812 PRE-PROCEDURE LAB EXAM: ICD-10-CM

## 2025-01-02 NOTE — PROGRESS NOTES
Request received to review order/ protocol for coronary CTA on 1/2/25. Scan approved for either cardiac scanner. Upon review of available medical records, the following orders have been placed:    Order placed for outpatient, non fasting creatinine blood draw to check kidney function within 30 days prior to contrast administration for coronary CTA if determined medically necessary by CT staff. Instructions for timing of blood test to be given by scheduling/ CT facility team.    Single dose metoprolol sent to pharmacy by her cardiologist, take before the scan as directed.     [facility] , option 2.

## (undated) DEVICE — PAD SANITARY 11IN MAXI IND WRAPPED  (12EA/PK 24PK/CA)

## (undated) DEVICE — GLOVE SZ 6 BIOGEL PI MICRO - PF LF (50PR/BX 4BX/CA)

## (undated) DEVICE — GLOVE BIOGEL SZ 8 SURGICAL PF LTX - (50PR/BX 4BX/CA)

## (undated) DEVICE — DERMABOND ADVANCED - (12EA/BX)

## (undated) DEVICE — Device

## (undated) DEVICE — CANISTER SUCTION 3000ML MECHANICAL FILTER AUTO SHUTOFF MEDI-VAC NONSTERILE LF DISP  (40EA/CA)

## (undated) DEVICE — GLOVE BIOGEL SZ 6.5 SURGICAL PF LTX (50PR/BX 4BX/CA)

## (undated) DEVICE — CANNULA W/SEAL 5X100 Z-THRE - ADED KII (12/BX)

## (undated) DEVICE — GLOVE BIOGEL INDICATOR SZ 6.5 SURGICAL PF LTX - (50PR/BX 4BX/CA)

## (undated) DEVICE — SUCTION INSTRUMENT YANKAUER BULBOUS TIP W/O VENT (50EA/CA)

## (undated) DEVICE — GLOVE BIOGEL PI INDICATOR SZ 6.5 SURGICAL PF LF - (50/BX 4BX/CA)

## (undated) DEVICE — LACTATED RINGERS INJ 1000 ML - (14EA/CA 60CA/PF)

## (undated) DEVICE — CANISTER SUCTION RIGID RED 1500CC (40EA/CA)

## (undated) DEVICE — TRAY FOLEY CATHETER STATLOCK 16FR SURESTEP  (10EA/CA)

## (undated) DEVICE — DRAPESURG STERI-DRAPE LONG - (10/BX 4BX/CA)

## (undated) DEVICE — APPLICATOR ENDOSCOPIC SURGICEL (5EA/BX)

## (undated) DEVICE — GOWN SURGEONS X-LARGE - DISP. (30/CA)

## (undated) DEVICE — SENSOR OXIMETER ADULT SPO2 RD SET (20EA/BX)

## (undated) DEVICE — TRAY SRGPRP PVP IOD WT PRP - (20/CA)

## (undated) DEVICE — SLEEVE VASO CALF MED - (10PR/CA)

## (undated) DEVICE — TRAP SPECIMEN MUCUS STERILE - (50/CA)

## (undated) DEVICE — GLOVE BIOGEL PI INDICATOR SZ 8.5 SURGICAL PF LF - (50PR/BX 4BX/CA)

## (undated) DEVICE — HEMOSTAT ABSORBABLE POWDER SURGICEL 3G (5EA/BX)

## (undated) DEVICE — MASK OXYGEN VNYL ADLT MED CONC WITH 7 FOOT TUBING  - (50EA/CA)

## (undated) DEVICE — SODIUM CHL IRRIGATION 0.9% 1000ML (12EA/CA)

## (undated) DEVICE — GOWN SURGEONS LARGE - (32/CA)

## (undated) DEVICE — KIT  I.V. START (100EA/CA)

## (undated) DEVICE — SUTURE 0 COATED VICRYL 6-18IN - (12PK/BX)

## (undated) DEVICE — GLOVE BIOGEL PI INDICATOR SZ 7.0 SURGICAL PF LF - (50/BX 4BX/CA)

## (undated) DEVICE — SUTURE PASSER 14GA DISPOSABLE

## (undated) DEVICE — TROCAR 5X100 BLADED Z-THREAD - KII (6/BX)

## (undated) DEVICE — WATER IRRIGATION STERILE 1000ML (12EA/CA)

## (undated) DEVICE — VESSEL DIVIDER SEAL LAP LIGASURE 37CM (6EA/BX)

## (undated) DEVICE — TROCAR Z THREAD11MM OPTICAL - NON BLADED(6/BX)

## (undated) DEVICE — SUTURE 4-0 MONOCRYL PLUS PS-2 - 27 INCH (36/BX)

## (undated) DEVICE — ANTI-FOG SOLUTION - 60BTL/CA

## (undated) DEVICE — NEEDLE INSFL 120MM 14GA VRRS - (20/BX)

## (undated) DEVICE — TOWEL STOP TIMEOUT SAFETY FLAG (40EA/CA)

## (undated) DEVICE — TUBING CLEARLINK DUO-VENT - C-FLO (48EA/CA)

## (undated) DEVICE — TUBE CONNECTING SUCTION - CLEAR PLASTIC STERILE 72 IN (50EA/CA)

## (undated) DEVICE — DRESSING NON-ADHERING 8 X 3 - (50/BX)

## (undated) DEVICE — CANNULA O2 COMFORT SOFT EAR ADULT 7 FT TUBING (50/CA)

## (undated) DEVICE — BAG RETRIEVAL 10ML (10EA/BX)

## (undated) DEVICE — SET LEADWIRE 5 LEAD BEDSIDE DISPOSABLE ECG (1SET OF 5/EA)

## (undated) DEVICE — SET TUBING PNEUMOCLEAR HIGH FLOW SMOKE EVACUATION (10EA/BX)

## (undated) DEVICE — SUTURE GENERAL

## (undated) DEVICE — SET SUCTION/IRRIGATION WITH DISPOSABLE TIP (6/CA )PART #0250-070-520 IS A SUB

## (undated) DEVICE — GOWN WARMING STANDARD FLEX - (30/CA)

## (undated) DEVICE — TROCAR 5X100 NON BLADED Z-TH - READ KII (6/BX)